# Patient Record
Sex: FEMALE | Race: WHITE | NOT HISPANIC OR LATINO | Employment: OTHER | URBAN - METROPOLITAN AREA
[De-identification: names, ages, dates, MRNs, and addresses within clinical notes are randomized per-mention and may not be internally consistent; named-entity substitution may affect disease eponyms.]

---

## 2017-02-22 RX ORDER — ATORVASTATIN CALCIUM 20 MG/1
TABLET, FILM COATED ORAL
Qty: 30 TAB | Refills: 3 | Status: SHIPPED | OUTPATIENT
Start: 2017-02-22 | End: 2023-02-16

## 2018-07-25 ENCOUNTER — OFFICE VISIT (OUTPATIENT)
Dept: NEUROLOGY | Facility: MEDICAL CENTER | Age: 48
End: 2018-07-25
Payer: MEDICAID

## 2018-07-25 VITALS
WEIGHT: 137.57 LBS | RESPIRATION RATE: 18 BRPM | TEMPERATURE: 97.3 F | SYSTOLIC BLOOD PRESSURE: 110 MMHG | OXYGEN SATURATION: 95 % | HEIGHT: 59 IN | DIASTOLIC BLOOD PRESSURE: 66 MMHG | BODY MASS INDEX: 27.73 KG/M2 | HEART RATE: 63 BPM

## 2018-07-25 DIAGNOSIS — G89.4 CHRONIC PAIN SYNDROME: ICD-10-CM

## 2018-07-25 DIAGNOSIS — G43.119 MIGRAINE WITH AURA, INTRACTABLE, WITHOUT STATUS MIGRAINOSUS: Primary | ICD-10-CM

## 2018-07-25 PROCEDURE — 99205 OFFICE O/P NEW HI 60 MIN: CPT | Performed by: PSYCHIATRY & NEUROLOGY

## 2018-07-25 RX ORDER — TOPIRAMATE 25 MG/1
TABLET ORAL
Qty: 120 TAB | Refills: 1 | Status: SHIPPED | OUTPATIENT
Start: 2018-07-25 | End: 2018-10-21 | Stop reason: SDUPTHER

## 2018-07-25 RX ORDER — SUMATRIPTAN 100 MG/1
TABLET, FILM COATED ORAL
Qty: 9 TAB | Refills: 6 | Status: SHIPPED | OUTPATIENT
Start: 2018-07-25 | End: 2019-04-26 | Stop reason: SDUPTHER

## 2018-07-25 RX ORDER — GABAPENTIN 800 MG/1
800 TABLET ORAL 3 TIMES DAILY
Qty: 90 TAB | Refills: 0 | Status: SHIPPED | DISCHARGE
Start: 2018-07-25

## 2018-07-25 ASSESSMENT — ENCOUNTER SYMPTOMS
DIZZINESS: 0
PHOTOPHOBIA: 1
DOUBLE VISION: 0
MEMORY LOSS: 1
HEADACHES: 1

## 2018-07-25 ASSESSMENT — PATIENT HEALTH QUESTIONNAIRE - PHQ9: CLINICAL INTERPRETATION OF PHQ2 SCORE: 0

## 2018-07-25 ASSESSMENT — PAIN SCALES - GENERAL: PAINLEVEL: 5=MODERATE PAIN

## 2018-07-25 NOTE — PROGRESS NOTES
Subjective:      Meggan Espinoza is a 48 y.o. female who presents from the office of Dr. Painting, for consultation, with a history of persistent headaches associated with word finding difficulties and memory loss.     RADHA Broderick is a pleasant 48-year-old right-handed  female whose symptoms all seem to have started together beginning a couple of years ago, and though things have not worsened, they have not improved.    The word finding issues she describes her ones of hesitancy when expressing herself. She has no issues with comprehension, she will in mid sentence for get a word, has difficulty finding the right words though can explain herself around it. Others identify the word, she recognizes it and uses it appropriately. Comprehension is not an issue. Paraphasic errors are not used. She can use these words in other conversation without issue. This is an intermittent issue but occurs on a daily basis.    With this, memory loss also has become notable. Though she is independent with her ADLs, she notes that her mental processing is simply slower. She is more likely to forget things though it has not gotten her into trouble when he comes to appointments, bills, daily activities around the home, etc. She is sleeping well.    The headaches are most problematic, she describes a daily moderate pain level of 5 a 0-10 scale. It is one-sided, typically on the left, there is some definite malaise and fatigue. Severe headaches remain left-sided but these now pulse or throb, concentration and word finding issues clearly deteriorate further, she can become nauseated, there are heightened sensitivities to sounds and light, she would simply prefer to close herself off from the rest of the world.    The severe attacks occur maybe once in a week, sometimes more often, these started about one year ago, moderate headaches began 2 years beforehand. The severe headache attacks tend to start in the mornings.    Stress  "and menses have been the typical triggers for the severe headaches. She has not received any prescription medications for the headaches.    MRI scan of the brain was done in October, 2017, I reviewed the images, the study was unremarkable.    She has a history of hypertension, diabetes without neuropathy, nephropathy or retinopathy, and dyslipidemia. There is no history of CAD, CVA, PVD, malignancy, MS, seizure, migraine, neurodegenerative disease, autoimmune disease, psychiatric disease, liver or kidney disease, kidney stones, glaucoma, pulmonary disease, GERD, or blood dyscrasia. There is no surgical history of note from my standpoint. She is not aware of anyone in the immediate family who has a history of headaches, her mother had dementia, she has 4 children, age range 18 through 28, none of whom have headaches. Her menses are irregular. She does not smoke or drink.    She is on Prinivil 20 mg daily, Lantus insulin and Glucophage, Neurontin 800 mg, 3 times a day, Lipitor 20 g daily, vitamin D and calcium supplements daily.    Review of Systems   Constitutional: Negative for malaise/fatigue.   Eyes: Positive for photophobia. Negative for double vision.   Cardiovascular: Negative for leg swelling.   Neurological: Positive for headaches. Negative for dizziness.   Psychiatric/Behavioral: Positive for memory loss.   All other systems reviewed and are negative.       Objective:     /66   Pulse 63   Temp 36.3 °C (97.3 °F)   Resp 18   Ht 1.499 m (4' 11\")   Wt 62.4 kg (137 lb 9.1 oz)   LMP 07/02/2015   SpO2 95%   BMI 27.79 kg/m²      Physical Exam    She appears in no acute distress. She is quite cooperative. She still complains of moderate headache pain, there is mild photophobia. Vital signs are stable. There was no malar rash, there is bitemporal tenderness without jaw claudication. The neck is supple, range of motion is full, carotid pulses are present bilaterally without asymmetry or bruits. Cardiac " evaluation reveals a regular rhythm. There is no lower extremity edema.    Cognition is intact, she is fully oriented, there is no aphasia, apraxia, or inattention.    Cranial nerve exam reveals PERRLA/EOMI, visual fields are full to finger counting confrontation, funduscopic exam reveals sharp disc margins, facial movements are symmetric, sensory exam is intact to temperature and light touch bilaterally, the tongue and uvula are midline without bulbar dysfunction, shoulder shrug and head rotation are intact.    Musculoskeletal exam reveals normal tone throughout, there is no tremor, asterixis, or drift. The ankle jerks are absent, knee jerks trace present, reflexes in the upper extremities are symmetric. Both toes are downgoing.    She walks with normal station though she looks at the ground continuously. Stride length is diminished, she does not shuffle, she walks with a flatfoot, almost with a magnetic quality. Tandem walking is problematic. She can walk on her heels and toes. There is no appendicular dystaxia, repetitive movements are intact and symmetric in all 4 extremities.    Sensory exam reveals a stocking loss of vibration below the ankles, temperature is intact bilaterally. Romberg is absent.     Assessment/Plan:     1. Migraine with aura, intractable, without status migrainosus  I suspect all of the symptoms she is experiencing including the word finding difficulties and concentration impairment/memory loss, had more to do with chronic pain and her migraines which have achieved a very intractable state over the last couple of years, rather than a primary CNS process with symptomatic headaches. Migraine is often associated with focal neurologic signs and symptoms, it is certainly associated with cognitive impairment, etc. when it is at its worst. Imaging has already ruled out structural pathology, an EEG should be done for thoroughness to rule out nonconvulsive seizures as part of this picture.    With a  history of headaches that fit migraine criteria, such as hers, along with a normal neurologic examination, there is a less than 0.2% chance of this being symptomatic issue. Symptomatic relief should be initiated, we will observe to see how things go.    The nature of migraine was reviewed. The rationale for my treatment recommendations and EEG study being performed were also reviewed. She has a good prognosis for treatment outcome in that she is never been on medication to begin with, though she is on gabapentin at a good dose for other reasons, and this obviously has not provided benefit. She almost fits criteria for chronic migraine, in that circumstance Topamax and Botox are the recommended treatments. Topamax 25 mg daily at bedtime will be started, increasing weekly by 25 mg increments up to 100 mg daily at bedtime. Side effects were reviewed. For rescue, Imitrex 100 mg tablets will be used as soon as the daily headaches begin to increase, and this can be repeated in one hour. Side effects of the treatments were reviewed in full. Phone contact in the interim, we can follow-up in several months otherwise.    - sumatriptan (IMITREX) 100 MG tablet; 1 tab at headache onset; repeat in 1 hour prn  Dispense: 9 Tab; Refill: 6  - topiramate (TOPAMAX) 25 MG Tab; 1 tab qhs for 1 week, then 2 tab qhs for 1 week, then 3 tab qhs for 1 week, then 4 tab qhs  Dispense: 120 Tab; Refill: 1  - REFERRAL TO NEURODIAGNOSTICS (EEG,EP,EMG/NCS/DBS) Modality Requested: EEG-Video

## 2018-10-21 DIAGNOSIS — G43.119 MIGRAINE WITH AURA, INTRACTABLE, WITHOUT STATUS MIGRAINOSUS: ICD-10-CM

## 2018-10-22 RX ORDER — TOPIRAMATE 100 MG/1
100 TABLET, FILM COATED ORAL
Qty: 90 TAB | Refills: 3 | Status: SHIPPED | OUTPATIENT
Start: 2018-10-22 | End: 2018-11-26 | Stop reason: CLARIF

## 2018-11-19 ENCOUNTER — NON-PROVIDER VISIT (OUTPATIENT)
Dept: NEUROLOGY | Facility: MEDICAL CENTER | Age: 48
End: 2018-11-19
Payer: MEDICAID

## 2018-11-19 DIAGNOSIS — G43.119 MIGRAINE WITH AURA, INTRACTABLE, WITHOUT STATUS MIGRAINOSUS: ICD-10-CM

## 2018-11-19 PROCEDURE — 95951 EEG: CPT | Mod: 52 | Performed by: PSYCHIATRY & NEUROLOGY

## 2018-11-19 NOTE — PROCEDURES
VIDEO ELECTROENCEPHALOGRAM REPORT      Referring provider: Dr. Painting    DOS: November 19, 2018, for a total of approximately 30-minute duration.    INDICATION:  Meggan Espinoza 48 y.o. female presenting with a history of intractable migraine headache with associated cognitive impairment and a aphasia.    CURRENT ANTIEPILEPTIC REGIMEN: She is on Topamax 100 mg, nightly, for migraine prevention.    TECHNIQUE: 30 channel video electroencephalogram (EEG) was performed in accordance with the international 10-20 system. The study was reviewed in bipolar and referential montages. The recording examined the patient during wakeful and drowsy state(s).     DESCRIPTION OF THE RECORD:  During the wakefulness, the background showed a symmetrical 10 Hz alpha activity posteriorly with amplitude of 70 mV.  There was reactivity to eye closure/opening.  A normal anterior-posterior gradient was noted with faster beta frequencies seen anteriorly.  During drowsiness, generalized suppression of background rhythm is seen, no focal slowing or paroxysmal qualities are seen.  The patient does not achieve sleep for the tracing.    ACTIVATION PROCEDURES:   Hyperventilation was performed by the patient for a total of 3 minutes. The technician performing the test noted good effort. This technique produced electroencephalographic changes in keeping with the expected bilaterally synchronous, frontally predominant, high amplitude slow waves build up.     Intermittent Photic stimulation was performed in a stepwise fashion from 1 to 30 Hz, eliciting some driving response posteriorly over the occipital hemispheres at intermediate frequencies of stimulation only, activation did not occur otherwise.    ICTAL AND/OR INTERICTAL FINDINGS:   No focal or generalized epileptiform activity noted. No regional slowing was seen during this routine study.  No clinical events or seizures were reported or recorded during the study.     EKG: sampling of the  EKG recording demonstrated sinus rhythm.     EVENTS: No clinical seizure activity or events are documented.    INTERPRETATION:  This is a normal video EEG recording in the awake and drowsy state(s).  Clinical correlation is recommended.  A cause for the patient's clinical symptoms of cognitive impairment and language deficit were found.    Note: A normal EEG does not rule out epilepsy.  If the clinical suspicion remains high for seizures, a prolonged recording to capture clinical or subclinical events may be helpful.        Jun Smith M.D.

## 2018-11-26 ENCOUNTER — OFFICE VISIT (OUTPATIENT)
Dept: NEUROLOGY | Facility: MEDICAL CENTER | Age: 48
End: 2018-11-26
Payer: MEDICAID

## 2018-11-26 VITALS
DIASTOLIC BLOOD PRESSURE: 64 MMHG | HEIGHT: 59 IN | OXYGEN SATURATION: 93 % | SYSTOLIC BLOOD PRESSURE: 100 MMHG | BODY MASS INDEX: 32.01 KG/M2 | TEMPERATURE: 98.3 F | RESPIRATION RATE: 18 BRPM | WEIGHT: 158.8 LBS | HEART RATE: 113 BPM

## 2018-11-26 DIAGNOSIS — G43.119 MIGRAINE WITH AURA, INTRACTABLE, WITHOUT STATUS MIGRAINOSUS: Primary | ICD-10-CM

## 2018-11-26 PROCEDURE — 99213 OFFICE O/P EST LOW 20 MIN: CPT | Performed by: PSYCHIATRY & NEUROLOGY

## 2018-11-26 RX ORDER — TOPIRAMATE 200 MG/1
1 CAPSULE, EXTENDED RELEASE ORAL EVERY EVENING
Qty: 30 CAP | Refills: 6 | Status: SHIPPED | OUTPATIENT
Start: 2018-11-26 | End: 2019-04-26 | Stop reason: SDUPTHER

## 2018-11-26 RX ORDER — TRAZODONE HYDROCHLORIDE 50 MG/1
50-100 TABLET ORAL NIGHTLY
COMMUNITY

## 2018-11-26 ASSESSMENT — PAIN SCALES - GENERAL: PAINLEVEL: 7=MODERATE-SEVERE PAIN

## 2018-11-26 ASSESSMENT — ENCOUNTER SYMPTOMS
MEMORY LOSS: 0
HEADACHES: 1
PHOTOPHOBIA: 0

## 2018-11-26 NOTE — PROGRESS NOTES
"Subjective:      Meggan Espinoza is a 48 y.o. female who presents for follow-up with a history of chronic migraine with aura.     HPI    Meggan states that the headaches have improved consistently, suboptimally, though she is still dealing with daily headache pain.  Though Imitrex 100 mg does work consistently, on Topamax 100 mg nightly without issue of side effect or adverse event, she is still chewing through more than #9 Imitrex tablets/month, if she could in any case.  She denies issues with daytime grogginess, cognitive impairment, taste distortion or paresthesias with her Topamax.    Medical, surgical and family histories are reviewed in the electronic health record, there are no new drug allergies.  She is on Topamax 100 mg, nightly, Imitrex 100 mg as needed, Neurontin 800 mg, 3 times daily, Prinivil, trazodone, Glucophage, Lipitor, the rest as per the electronic health record, noncontributory from my standpoint.    Review of Systems   Constitutional: Positive for malaise/fatigue.   Eyes: Negative for photophobia.   Neurological: Positive for headaches.   Psychiatric/Behavioral: Negative for memory loss.   All other systems reviewed and are negative.       Objective:     /64 (BP Location: Right arm, Patient Position: Sitting)   Pulse (!) 113   Temp 36.8 °C (98.3 °F) (Temporal)   Resp 18   Ht 1.499 m (4' 11\")   Wt 72 kg (158 lb 12.8 oz)   LMP 07/02/2015   SpO2 93%   BMI 32.07 kg/m²      Physical Exam    She appears in no acute distress.  Vital signs are stable.  There is no malar rash or sialorrhea.  The neck is supple, range of motion is full.  Cardiac evaluation reveals a regular rhythm.  In quick and cursory fashion, with observation, mental status, cranial nerve, motor and coordination evaluations remain intact.  Sensory exam is deferred.     Assessment/Plan:     1. Migraine with aura, intractable, without status migrainosus  Still problematic, I am encouraged in that she has had some " benefit with Topamax, though it is suboptimal.  This suggests a continued responsiveness, thus I will increase the dose, using Trokendi XR formulation, better tolerated, increasing to 200 mg nightly.  If not covered by her insurance, we will get back to the generic topiramate 200 mg nightly.  Side effects were reviewed again.  She is doing well, avoiding OTC medicines entirely, she will use Imitrex as needed.    Face-to-face time was spent reviewing all of the above.  Botox or one of the new CGRP blockers would be the next options, she was reassured that can be used with Topamax if the drug provides benefit.  If there is no meaningful change with a higher dose of Topamax, and will simply be discontinued and another treatment instituted.  She and I will follow-up in 5 months.    - Topiramate ER (TROKENDI XR) 200 MG CAPSULE SR 24 HR; Take 1 Capsule 24 hour sustained release by mouth every evening.  Dispense: 30 Cap; Refill: 6    Time: 20 minutes spent face-to-face for exam, review, discussion, and education, of this over 70% of the time spent counseling and coordinating care.

## 2019-04-26 ENCOUNTER — OFFICE VISIT (OUTPATIENT)
Dept: NEUROLOGY | Facility: MEDICAL CENTER | Age: 49
End: 2019-04-26
Payer: MEDICAID

## 2019-04-26 VITALS
SYSTOLIC BLOOD PRESSURE: 100 MMHG | HEIGHT: 59 IN | BODY MASS INDEX: 29.92 KG/M2 | WEIGHT: 148.4 LBS | TEMPERATURE: 95.9 F | RESPIRATION RATE: 15 BRPM | OXYGEN SATURATION: 93 % | DIASTOLIC BLOOD PRESSURE: 66 MMHG | HEART RATE: 85 BPM

## 2019-04-26 DIAGNOSIS — G43.119 MIGRAINE WITH AURA, INTRACTABLE, WITHOUT STATUS MIGRAINOSUS: Primary | ICD-10-CM

## 2019-04-26 PROCEDURE — 99213 OFFICE O/P EST LOW 20 MIN: CPT | Performed by: PSYCHIATRY & NEUROLOGY

## 2019-04-26 RX ORDER — TOPIRAMATE 200 MG/1
1 CAPSULE, EXTENDED RELEASE ORAL EVERY EVENING
Qty: 30 CAP | Refills: 6 | Status: SHIPPED | OUTPATIENT
Start: 2019-04-26 | End: 2020-01-03

## 2019-04-26 RX ORDER — SUMATRIPTAN 100 MG/1
TABLET, FILM COATED ORAL
Qty: 9 TAB | Refills: 11 | Status: SHIPPED | OUTPATIENT
Start: 2019-04-26 | End: 2020-05-18

## 2019-04-26 RX ORDER — KETOROLAC TROMETHAMINE 30 MG/ML
60 INJECTION, SOLUTION INTRAMUSCULAR; INTRAVENOUS ONCE
OUTPATIENT
Start: 2019-04-26 | End: 2019-04-27

## 2019-04-26 ASSESSMENT — PATIENT HEALTH QUESTIONNAIRE - PHQ9: CLINICAL INTERPRETATION OF PHQ2 SCORE: 0

## 2019-04-26 ASSESSMENT — ENCOUNTER SYMPTOMS
PHOTOPHOBIA: 1
HEADACHES: 1

## 2019-04-26 ASSESSMENT — PAIN SCALES - GENERAL: PAINLEVEL: 6=MODERATE PAIN

## 2019-04-26 NOTE — PROGRESS NOTES
"Subjective:      Meggan Espinoza is a 49 y.o. female who presents for 5-month follow-up, with a history of intractable migraine with aura.    HPI    When seen last year, Meggan had stated the headaches were responding nicely though still incomplete.  Imitrex was working, she was still using it more than #9 tablets in a month.    Over the last couple of months, the headaches have again increased, for more than 3 months she has been dealing with more than 20 days/month of incapacitating pain.  The headaches themselves are still unchanged in nature, they are simply lasting longer when they occur.  Imitrex works, the problem is the headaches recur.  We had increased her Trokendi XL to 200 mg daily, even in the evenings the headaches have increased.  She is tolerating the higher dose without issues of drowsiness, cognitive impairment, etc.    Medical, surgical and family histories are reviewed in the electronic health record, there are no new drug allergies.  She is on gabapentin 800 mg, 3 times daily, for chronic pain, Prinivil, Glucophage, Lipitor, trazodone, as well as the Imitrex and Trokendi XL as above.    Review of Systems   Eyes: Positive for photophobia.   Neurological: Positive for headaches.   All other systems reviewed and are negative.       Objective:     /66 (BP Location: Right arm, Patient Position: Sitting)   Pulse 85   Temp (!) 35.5 °C (95.9 °F) (Temporal)   Resp 15   Ht 1.499 m (4' 11\")   Wt 67.3 kg (148 lb 6.4 oz)   LMP 07/02/2015   SpO2 93%   BMI 29.97 kg/m²      Physical Exam    She appears in some mild distress, she is complaining of a significant headache with photophobia.  She is still cooperative.  Vital signs are stable.  There is no malar rash.  The neck is supple, range of motion full.  Cardiac evaluation reveals a regular rhythm.  In quick and cursory fashion, mental status, cranial nerve, musculoskeletal and coordination evaluations are intact.     Assessment/Plan:     1. " Migraine with aura, intractable, without status migrainosus  She fits criteria for chronic migraine, higher doses of Topamax may have provided a little benefit, tachyphylaxis certainly can develop with this drug.  We will continue its use nonetheless since it has been providing some benefit, but Emgality will be started, 1 of the newer CGRP migraine maintenance drugs available, he can be safely along with her other medications.  Side effects were reviewed.  It is a 120 mg monthly dose, the first dose is a 240 mg bolus.  A prescription card is provided.  Phone contact in the interim, we will follow-up in about 5 months.    For the headache she is suffering from now, Toradol 60 mg IM will be provided in the office.    - Galcanezumab-gnlm (EMGALITY) 120 MG/ML Solution Auto-injector; Inject 1 PEN as instructed Q 4 Weeks.  Dispense: 1 PEN; Refill: 5  - Topiramate ER (TROKENDI XR) 200 MG CAPSULE SR 24 HR; Take 1 Capsule 24 hour sustained release by mouth every evening.  Dispense: 30 Cap; Refill: 6  - sumatriptan (IMITREX) 100 MG tablet; 1 tab at headache onset; repeat in 1 hour prn  Dispense: 9 Tab; Refill: 11  - ketorolac (TORADOL) injection 60 mg; 2 mL by Intramuscular route Once.    Time: 20 minutes spent face-to-face for exam, review, discussion, and education, of this over 50% of the time spent face-to-face counseling and coordinating care.  I0

## 2019-08-05 ENCOUNTER — TELEPHONE (OUTPATIENT)
Dept: NEUROLOGY | Facility: MEDICAL CENTER | Age: 49
End: 2019-08-05

## 2019-08-05 NOTE — TELEPHONE ENCOUNTER
"Patient's insurance denied her reauthorization for her Emgality because your note did not specifically specify that there was a reduction in her migraines. To appeal the denial the letter says \"we may consider continued approval of this drug in certain situations (when there is a reduction in the overall number of migraine days or reduction in number of severe migraine days per month and there is obtained clinical benefit deemed significant by the patient or prescriber).\"    Can you please write a letter stating such so I can mail to her insurance? It must be mailed to:     Central Appeals Processing  [facility] [employer] [facility] [employer]  [address] [id number]  [place], [place] [address]    "

## 2019-08-06 NOTE — TELEPHONE ENCOUNTER
Since I have not seen the patient since the Emgality was started, it is impossible for me to have a note in the chart stating that the drug is working.  If it is, I need to hear from the patient as to what type of improvement she is getting.  Before the drug was started, she was getting more than 20 days/month of incapacitating headaches.  I need to know how many days in a week she is having a severe headache for which she is using her Imitrex.

## 2019-08-09 NOTE — TELEPHONE ENCOUNTER
I was actually able to get Emgality approved by insurance. No need for letter. Approval is scanned into chart.

## 2019-08-22 ENCOUNTER — OFFICE VISIT (OUTPATIENT)
Dept: NEUROLOGY | Facility: MEDICAL CENTER | Age: 49
End: 2019-08-22
Payer: MEDICAID

## 2019-08-22 VITALS
WEIGHT: 145 LBS | OXYGEN SATURATION: 97 % | DIASTOLIC BLOOD PRESSURE: 78 MMHG | SYSTOLIC BLOOD PRESSURE: 102 MMHG | BODY MASS INDEX: 29.23 KG/M2 | HEIGHT: 59 IN | TEMPERATURE: 97.4 F | HEART RATE: 66 BPM

## 2019-08-22 DIAGNOSIS — G43.119 MIGRAINE WITH AURA, INTRACTABLE, WITHOUT STATUS MIGRAINOSUS: Primary | ICD-10-CM

## 2019-08-22 PROCEDURE — 99213 OFFICE O/P EST LOW 20 MIN: CPT | Performed by: PSYCHIATRY & NEUROLOGY

## 2019-08-22 ASSESSMENT — ENCOUNTER SYMPTOMS
MEMORY LOSS: 0
HEADACHES: 1

## 2019-08-22 NOTE — PROGRESS NOTES
"Subjective:      Meggan Espinoza is a 49 y.o. female who presents for follow-up, with a history of migraine with aura, intractable, now on Emgality over the last several months with consistent though still imperfect response.    HPI     The headaches have shown a consistent benefit with Emgality, takes maybe 1 week for maximal benefit to be seen, he does very well for the next 2, but the headaches then begin to recur in the week prior to her next injection.  The headaches at that time are also a little bit more intense.  She is remained on Trokendi  mg every evening throughout, Imitrex used as rescue.  During the 3 weeks of improving and stable response, she will use Imitrex rarely, but during the last week she may have to use it 4-5 times overall.    The Emgality itself is well-tolerated, she denies issues with constipation or hair loss.    Medical, surgical and family histories are reviewed in the electronic health record, there are no new drug allergies.  In addition to the above, she is also on Neurontin 800 mg Prinivil, trazodone, Glucophage, and Lipitor.    Review of Systems   Neurological: Positive for headaches.   Psychiatric/Behavioral: Negative for memory loss.   All other systems reviewed and are negative.       Objective:     /78 (BP Location: Left arm, Patient Position: Sitting, BP Cuff Size: Adult)   Pulse 66   Temp 36.3 °C (97.4 °F) (Temporal)   Ht 1.499 m (4' 11\")   Wt 65.8 kg (145 lb)   LMP 07/02/2015   SpO2 97%   BMI 29.29 kg/m²      Physical Exam    She appears in no acute distress.  Vital signs are stable.  There is no malar rash or jaw claudication.  The neck is supple.  Cardiac evaluation is unremarkable.  There is the restrained range of motion of the right shoulder.    Including mental status, cranial nerves, musculoskeletal and coordination evaluations, the full examination is unremarkable.     Assessment/Plan:     1. Migraine with aura, intractable, without status " migrainosus  I would like to maintain the course for just a few more months to see what type of benefit is seen at maximum.  This may take a little while to achieve, the studies were 6-month duration, she has been on the drug for only 3+ months.  She is comfortable with this.  She will continue the Trokendi XR and Imitrex unchanged.  We will follow-up otherwise in about 6 months.    Time: 20 minutes spent face-to-face for exam, review, discussion, and education, of this over 50% of the time spent counseling and coordinating care.

## 2019-09-30 DIAGNOSIS — G43.119 MIGRAINE WITH AURA, INTRACTABLE, WITHOUT STATUS MIGRAINOSUS: ICD-10-CM

## 2019-09-30 RX ORDER — TOPIRAMATE 100 MG/1
TABLET, FILM COATED ORAL
Qty: 30 TAB | Refills: 11 | Status: SHIPPED | OUTPATIENT
Start: 2019-09-30 | End: 2020-12-14

## 2019-10-01 DIAGNOSIS — G43.119 MIGRAINE WITH AURA, INTRACTABLE, WITHOUT STATUS MIGRAINOSUS: ICD-10-CM

## 2019-10-01 NOTE — TELEPHONE ENCOUNTER
Was the patient seen in the last year in this department? Yes    Does patient have an active prescription for medications requested? Yes    Received Request Via: Pharmacy    This is the maintenance dose

## 2019-10-02 RX ORDER — GALCANEZUMAB 120 MG/ML
INJECTION, SOLUTION SUBCUTANEOUS
Qty: 1 PEN | Refills: 5 | Status: SHIPPED | OUTPATIENT
Start: 2019-10-02 | End: 2020-04-16 | Stop reason: SDUPTHER

## 2020-01-02 DIAGNOSIS — G43.119 MIGRAINE WITH AURA, INTRACTABLE, WITHOUT STATUS MIGRAINOSUS: ICD-10-CM

## 2020-01-03 RX ORDER — TOPIRAMATE 200 MG/1
CAPSULE, EXTENDED RELEASE ORAL
Qty: 30 CAP | Refills: 6 | Status: SHIPPED | OUTPATIENT
Start: 2020-01-03 | End: 2020-07-21

## 2020-03-04 ENCOUNTER — TELEPHONE (OUTPATIENT)
Dept: NEUROLOGY | Facility: MEDICAL CENTER | Age: 50
End: 2020-03-04

## 2020-04-16 DIAGNOSIS — G43.119 MIGRAINE WITH AURA, INTRACTABLE, WITHOUT STATUS MIGRAINOSUS: ICD-10-CM

## 2020-04-16 RX ORDER — GALCANEZUMAB 120 MG/ML
120 INJECTION, SOLUTION SUBCUTANEOUS
Qty: 1 PEN | Refills: 5 | Status: SHIPPED | OUTPATIENT
Start: 2020-04-16 | End: 2020-10-14

## 2020-04-16 NOTE — TELEPHONE ENCOUNTER
Received request via: Patient    Was the patient seen in the last year in this department? Yes    Does the patient have an active prescription (recently filled or refills available) for medication(s) requested? Yes.

## 2020-05-18 DIAGNOSIS — G43.119 MIGRAINE WITH AURA, INTRACTABLE, WITHOUT STATUS MIGRAINOSUS: ICD-10-CM

## 2020-05-18 RX ORDER — SUMATRIPTAN 100 MG/1
TABLET, FILM COATED ORAL
Qty: 9 TAB | Refills: 11 | Status: SHIPPED | OUTPATIENT
Start: 2020-05-18 | End: 2021-06-01

## 2020-07-19 DIAGNOSIS — G43.119 MIGRAINE WITH AURA, INTRACTABLE, WITHOUT STATUS MIGRAINOSUS: ICD-10-CM

## 2020-07-21 RX ORDER — TOPIRAMATE 200 MG/1
CAPSULE, EXTENDED RELEASE ORAL
Qty: 30 CAP | Refills: 6 | Status: SHIPPED | OUTPATIENT
Start: 2020-07-21 | End: 2021-02-01

## 2020-09-15 ENCOUNTER — TELEPHONE (OUTPATIENT)
Dept: NEUROLOGY | Facility: MEDICAL CENTER | Age: 50
End: 2020-09-15

## 2020-09-15 NOTE — TELEPHONE ENCOUNTER
PA for patient Emgality 120mg/ML sent in through [employer] on 9/15/2020.  Papers will be scanned into chart.  Waiting for response.     [email or web address] stated this:    PA Case: [id number], Status: Approved, Coverage Starts on: 9/15/2020 12:00:00 AM, Coverage Ends on: 9/15/2021 12:00:00 AM.

## 2020-09-17 ENCOUNTER — PRE-ADMISSION TESTING (OUTPATIENT)
Dept: ADMISSIONS | Facility: MEDICAL CENTER | Age: 50
End: 2020-09-17
Attending: ORTHOPAEDIC SURGERY
Payer: MEDICAID

## 2020-09-17 DIAGNOSIS — Z01.812 PRE-OPERATIVE LABORATORY EXAMINATION: ICD-10-CM

## 2020-09-17 DIAGNOSIS — Z01.810 PRE-OPERATIVE CARDIOVASCULAR EXAMINATION: ICD-10-CM

## 2020-09-17 LAB
ANION GAP SERPL CALC-SCNC: 16 MMOL/L (ref 7–16)
BUN SERPL-MCNC: 21 MG/DL (ref 8–22)
CALCIUM SERPL-MCNC: 8.7 MG/DL (ref 8.4–10.2)
CHLORIDE SERPL-SCNC: 109 MMOL/L (ref 96–112)
CO2 SERPL-SCNC: 18 MMOL/L (ref 20–33)
COVID ORDER STATUS COVID19: NORMAL
CREAT SERPL-MCNC: 1.08 MG/DL (ref 0.5–1.4)
EKG IMPRESSION: NORMAL
EST. AVERAGE GLUCOSE BLD GHB EST-MCNC: 163 MG/DL
GLUCOSE SERPL-MCNC: 157 MG/DL (ref 65–99)
HBA1C MFR BLD: 7.3 % (ref 0–5.6)
POTASSIUM SERPL-SCNC: 4.2 MMOL/L (ref 3.6–5.5)
SARS-COV-2 RNA RESP QL NAA+PROBE: NOTDETECTED
SODIUM SERPL-SCNC: 143 MMOL/L (ref 135–145)
SPECIMEN SOURCE: NORMAL

## 2020-09-17 PROCEDURE — 93005 ELECTROCARDIOGRAM TRACING: CPT

## 2020-09-17 PROCEDURE — 80048 BASIC METABOLIC PNL TOTAL CA: CPT

## 2020-09-17 PROCEDURE — 93010 ELECTROCARDIOGRAM REPORT: CPT | Performed by: INTERNAL MEDICINE

## 2020-09-17 PROCEDURE — 83036 HEMOGLOBIN GLYCOSYLATED A1C: CPT

## 2020-09-17 PROCEDURE — C9803 HOPD COVID-19 SPEC COLLECT: HCPCS

## 2020-09-17 PROCEDURE — U0003 INFECTIOUS AGENT DETECTION BY NUCLEIC ACID (DNA OR RNA); SEVERE ACUTE RESPIRATORY SYNDROME CORONAVIRUS 2 (SARS-COV-2) (CORONAVIRUS DISEASE [COVID-19]), AMPLIFIED PROBE TECHNIQUE, MAKING USE OF HIGH THROUGHPUT TECHNOLOGIES AS DESCRIBED BY CMS-2020-01-R: HCPCS

## 2020-09-17 PROCEDURE — 36415 COLL VENOUS BLD VENIPUNCTURE: CPT

## 2020-09-17 RX ORDER — LANCETS
EACH MISCELLANEOUS
COMMUNITY
Start: 2020-08-19

## 2020-09-17 RX ORDER — INSULIN GLARGINE 100 [IU]/ML
24 INJECTION, SOLUTION SUBCUTANEOUS EVERY EVENING
COMMUNITY
Start: 2020-08-21 | End: 2023-02-16

## 2020-09-17 RX ORDER — FAMOTIDINE 20 MG/1
TABLET, FILM COATED ORAL
COMMUNITY
Start: 2020-09-14 | End: 2023-02-16

## 2020-09-17 NOTE — OR NURSING
" Reviewed \"Preparing for your procedure\" verbally and copy given to pt. Also given \"fasting\", Pain management\", \"Patient Safety\", \"Speak-up\" handouts. Pointed to surgical site listed on consent.   "

## 2020-09-22 ENCOUNTER — ANESTHESIA EVENT (OUTPATIENT)
Dept: SURGERY | Facility: MEDICAL CENTER | Age: 50
End: 2020-09-22
Payer: MEDICAID

## 2020-09-22 ENCOUNTER — HOSPITAL ENCOUNTER (OUTPATIENT)
Facility: MEDICAL CENTER | Age: 50
End: 2020-09-22
Attending: ORTHOPAEDIC SURGERY | Admitting: ORTHOPAEDIC SURGERY
Payer: MEDICAID

## 2020-09-22 ENCOUNTER — ANESTHESIA (OUTPATIENT)
Dept: SURGERY | Facility: MEDICAL CENTER | Age: 50
End: 2020-09-22
Payer: MEDICAID

## 2020-09-22 VITALS
OXYGEN SATURATION: 97 % | RESPIRATION RATE: 20 BRPM | HEART RATE: 90 BPM | WEIGHT: 149.69 LBS | DIASTOLIC BLOOD PRESSURE: 86 MMHG | SYSTOLIC BLOOD PRESSURE: 130 MMHG | TEMPERATURE: 97.3 F | BODY MASS INDEX: 30.18 KG/M2 | HEIGHT: 59 IN

## 2020-09-22 LAB — GLUCOSE BLD-MCNC: 110 MG/DL (ref 65–99)

## 2020-09-22 PROCEDURE — 160036 HCHG PACU - EA ADDL 30 MINS PHASE I: Performed by: ORTHOPAEDIC SURGERY

## 2020-09-22 PROCEDURE — 160035 HCHG PACU - 1ST 60 MINS PHASE I: Performed by: ORTHOPAEDIC SURGERY

## 2020-09-22 PROCEDURE — 64415 NJX AA&/STRD BRCH PLXS IMG: CPT | Performed by: ORTHOPAEDIC SURGERY

## 2020-09-22 PROCEDURE — 160041 HCHG SURGERY MINUTES - EA ADDL 1 MIN LEVEL 4: Performed by: ORTHOPAEDIC SURGERY

## 2020-09-22 PROCEDURE — 160029 HCHG SURGERY MINUTES - 1ST 30 MINS LEVEL 4: Performed by: ORTHOPAEDIC SURGERY

## 2020-09-22 PROCEDURE — 160048 HCHG OR STATISTICAL LEVEL 1-5: Performed by: ORTHOPAEDIC SURGERY

## 2020-09-22 PROCEDURE — 501838 HCHG SUTURE GENERAL: Performed by: ORTHOPAEDIC SURGERY

## 2020-09-22 PROCEDURE — 160047 HCHG PACU  - EA ADDL 30 MINS PHASE II: Performed by: ORTHOPAEDIC SURGERY

## 2020-09-22 PROCEDURE — 700101 HCHG RX REV CODE 250: Performed by: ANESTHESIOLOGY

## 2020-09-22 PROCEDURE — 700105 HCHG RX REV CODE 258: Performed by: ORTHOPAEDIC SURGERY

## 2020-09-22 PROCEDURE — 700111 HCHG RX REV CODE 636 W/ 250 OVERRIDE (IP): Performed by: ANESTHESIOLOGY

## 2020-09-22 PROCEDURE — 82962 GLUCOSE BLOOD TEST: CPT

## 2020-09-22 PROCEDURE — 160046 HCHG PACU - 1ST 60 MINS PHASE II: Performed by: ORTHOPAEDIC SURGERY

## 2020-09-22 PROCEDURE — 160009 HCHG ANES TIME/MIN: Performed by: ORTHOPAEDIC SURGERY

## 2020-09-22 PROCEDURE — 160002 HCHG RECOVERY MINUTES (STAT): Performed by: ORTHOPAEDIC SURGERY

## 2020-09-22 PROCEDURE — C1713 ANCHOR/SCREW BN/BN,TIS/BN: HCPCS | Performed by: ORTHOPAEDIC SURGERY

## 2020-09-22 PROCEDURE — 502581 HCHG PACK, SHOULDER ARTHROSCOPY: Performed by: ORTHOPAEDIC SURGERY

## 2020-09-22 PROCEDURE — 500151 HCHG CANNULA, THRDED 8.4: Performed by: ORTHOPAEDIC SURGERY

## 2020-09-22 PROCEDURE — 700101 HCHG RX REV CODE 250: Performed by: ORTHOPAEDIC SURGERY

## 2020-09-22 PROCEDURE — 160025 RECOVERY II MINUTES (STATS): Performed by: ORTHOPAEDIC SURGERY

## 2020-09-22 PROCEDURE — 500028 HCHG ARTHROWAND TURBOVAC 3.5/90 SUCT.: Performed by: ORTHOPAEDIC SURGERY

## 2020-09-22 DEVICE — SUTURE ANCHOR 2.8MM: Type: IMPLANTABLE DEVICE | Site: SHOULDER | Status: FUNCTIONAL

## 2020-09-22 RX ORDER — ONDANSETRON 2 MG/ML
4 INJECTION INTRAMUSCULAR; INTRAVENOUS
Status: DISCONTINUED | OUTPATIENT
Start: 2020-09-22 | End: 2020-09-22 | Stop reason: HOSPADM

## 2020-09-22 RX ORDER — HYDRALAZINE HYDROCHLORIDE 20 MG/ML
5 INJECTION INTRAMUSCULAR; INTRAVENOUS
Status: DISCONTINUED | OUTPATIENT
Start: 2020-09-22 | End: 2020-09-22 | Stop reason: HOSPADM

## 2020-09-22 RX ORDER — HYDROMORPHONE HYDROCHLORIDE 1 MG/ML
0.1 INJECTION, SOLUTION INTRAMUSCULAR; INTRAVENOUS; SUBCUTANEOUS
Status: DISCONTINUED | OUTPATIENT
Start: 2020-09-22 | End: 2020-09-22 | Stop reason: HOSPADM

## 2020-09-22 RX ORDER — SODIUM CHLORIDE, SODIUM LACTATE, POTASSIUM CHLORIDE, CALCIUM CHLORIDE 600; 310; 30; 20 MG/100ML; MG/100ML; MG/100ML; MG/100ML
INJECTION, SOLUTION INTRAVENOUS CONTINUOUS
Status: DISCONTINUED | OUTPATIENT
Start: 2020-09-22 | End: 2020-09-22 | Stop reason: HOSPADM

## 2020-09-22 RX ORDER — CEFAZOLIN SODIUM 1 G/3ML
INJECTION, POWDER, FOR SOLUTION INTRAMUSCULAR; INTRAVENOUS PRN
Status: DISCONTINUED | OUTPATIENT
Start: 2020-09-22 | End: 2020-09-22 | Stop reason: SURG

## 2020-09-22 RX ORDER — ROPIVACAINE HYDROCHLORIDE 5 MG/ML
INJECTION, SOLUTION EPIDURAL; INFILTRATION; PERINEURAL
Status: COMPLETED | OUTPATIENT
Start: 2020-09-22 | End: 2020-09-22

## 2020-09-22 RX ORDER — KETOROLAC TROMETHAMINE 30 MG/ML
30 INJECTION, SOLUTION INTRAMUSCULAR; INTRAVENOUS
Status: DISCONTINUED | OUTPATIENT
Start: 2020-09-22 | End: 2020-09-22 | Stop reason: HOSPADM

## 2020-09-22 RX ORDER — DEXAMETHASONE SODIUM PHOSPHATE 4 MG/ML
INJECTION, SOLUTION INTRA-ARTICULAR; INTRALESIONAL; INTRAMUSCULAR; INTRAVENOUS; SOFT TISSUE PRN
Status: DISCONTINUED | OUTPATIENT
Start: 2020-09-22 | End: 2020-09-22 | Stop reason: SURG

## 2020-09-22 RX ORDER — ROCURONIUM BROMIDE 10 MG/ML
INJECTION, SOLUTION INTRAVENOUS PRN
Status: DISCONTINUED | OUTPATIENT
Start: 2020-09-22 | End: 2020-09-22 | Stop reason: SURG

## 2020-09-22 RX ORDER — HYDROMORPHONE HYDROCHLORIDE 1 MG/ML
0.2 INJECTION, SOLUTION INTRAMUSCULAR; INTRAVENOUS; SUBCUTANEOUS
Status: DISCONTINUED | OUTPATIENT
Start: 2020-09-22 | End: 2020-09-22 | Stop reason: HOSPADM

## 2020-09-22 RX ORDER — LIDOCAINE HYDROCHLORIDE 20 MG/ML
INJECTION, SOLUTION EPIDURAL; INFILTRATION; INTRACAUDAL; PERINEURAL PRN
Status: DISCONTINUED | OUTPATIENT
Start: 2020-09-22 | End: 2020-09-22 | Stop reason: SURG

## 2020-09-22 RX ORDER — DIPHENHYDRAMINE HYDROCHLORIDE 50 MG/ML
6.25 INJECTION INTRAMUSCULAR; INTRAVENOUS
Status: DISCONTINUED | OUTPATIENT
Start: 2020-09-22 | End: 2020-09-22 | Stop reason: HOSPADM

## 2020-09-22 RX ORDER — HYDROMORPHONE HYDROCHLORIDE 1 MG/ML
0.4 INJECTION, SOLUTION INTRAMUSCULAR; INTRAVENOUS; SUBCUTANEOUS
Status: DISCONTINUED | OUTPATIENT
Start: 2020-09-22 | End: 2020-09-22 | Stop reason: HOSPADM

## 2020-09-22 RX ORDER — OXYCODONE HCL 5 MG/5 ML
10 SOLUTION, ORAL ORAL
Status: DISCONTINUED | OUTPATIENT
Start: 2020-09-22 | End: 2020-09-22 | Stop reason: HOSPADM

## 2020-09-22 RX ORDER — OXYCODONE HCL 5 MG/5 ML
5 SOLUTION, ORAL ORAL
Status: DISCONTINUED | OUTPATIENT
Start: 2020-09-22 | End: 2020-09-22 | Stop reason: HOSPADM

## 2020-09-22 RX ORDER — LIDOCAINE HYDROCHLORIDE AND EPINEPHRINE 10; 10 MG/ML; UG/ML
INJECTION, SOLUTION INFILTRATION; PERINEURAL
Status: DISCONTINUED | OUTPATIENT
Start: 2020-09-22 | End: 2020-09-22 | Stop reason: HOSPADM

## 2020-09-22 RX ORDER — HALOPERIDOL 5 MG/ML
1 INJECTION INTRAMUSCULAR
Status: DISCONTINUED | OUTPATIENT
Start: 2020-09-22 | End: 2020-09-22 | Stop reason: HOSPADM

## 2020-09-22 RX ORDER — LIDOCAINE HYDROCHLORIDE 40 MG/ML
SOLUTION TOPICAL PRN
Status: DISCONTINUED | OUTPATIENT
Start: 2020-09-22 | End: 2020-09-22 | Stop reason: SURG

## 2020-09-22 RX ORDER — CLINDAMYCIN PHOSPHATE 150 MG/ML
INJECTION, SOLUTION INTRAVENOUS PRN
Status: DISCONTINUED | OUTPATIENT
Start: 2020-09-22 | End: 2020-09-22 | Stop reason: SURG

## 2020-09-22 RX ORDER — MEPERIDINE HYDROCHLORIDE 25 MG/ML
12.5 INJECTION INTRAMUSCULAR; INTRAVENOUS; SUBCUTANEOUS
Status: DISCONTINUED | OUTPATIENT
Start: 2020-09-22 | End: 2020-09-22 | Stop reason: HOSPADM

## 2020-09-22 RX ORDER — MIDAZOLAM HYDROCHLORIDE 1 MG/ML
INJECTION INTRAMUSCULAR; INTRAVENOUS PRN
Status: DISCONTINUED | OUTPATIENT
Start: 2020-09-22 | End: 2020-09-22 | Stop reason: SURG

## 2020-09-22 RX ORDER — ONDANSETRON 2 MG/ML
INJECTION INTRAMUSCULAR; INTRAVENOUS PRN
Status: DISCONTINUED | OUTPATIENT
Start: 2020-09-22 | End: 2020-09-22 | Stop reason: SURG

## 2020-09-22 RX ADMIN — SUGAMMADEX 150 MG: 100 INJECTION, SOLUTION INTRAVENOUS at 10:50

## 2020-09-22 RX ADMIN — MIDAZOLAM HYDROCHLORIDE 2 MG: 1 INJECTION, SOLUTION INTRAMUSCULAR; INTRAVENOUS at 09:56

## 2020-09-22 RX ADMIN — ROPIVACAINE HYDROCHLORIDE 15 ML: 5 INJECTION, SOLUTION EPIDURAL; INFILTRATION; PERINEURAL at 09:56

## 2020-09-22 RX ADMIN — ONDANSETRON 4 MG: 2 INJECTION INTRAMUSCULAR; INTRAVENOUS at 10:50

## 2020-09-22 RX ADMIN — PROPOFOL 150 MG: 10 INJECTION, EMULSION INTRAVENOUS at 10:13

## 2020-09-22 RX ADMIN — EPHEDRINE SULFATE 10 MG: 50 INJECTION INTRAMUSCULAR; INTRAVENOUS; SUBCUTANEOUS at 10:20

## 2020-09-22 RX ADMIN — DEXAMETHASONE SODIUM PHOSPHATE 8 MG: 4 INJECTION, SOLUTION INTRAMUSCULAR; INTRAVENOUS at 10:21

## 2020-09-22 RX ADMIN — FENTANYL CITRATE 100 MCG: 50 INJECTION, SOLUTION INTRAMUSCULAR; INTRAVENOUS at 10:13

## 2020-09-22 RX ADMIN — SODIUM CHLORIDE, POTASSIUM CHLORIDE, SODIUM LACTATE AND CALCIUM CHLORIDE: 600; 310; 30; 20 INJECTION, SOLUTION INTRAVENOUS at 09:58

## 2020-09-22 RX ADMIN — CEFAZOLIN 2 G: 1 INJECTION, POWDER, FOR SOLUTION INTRAVENOUS at 10:13

## 2020-09-22 RX ADMIN — EPHEDRINE SULFATE 10 MG: 50 INJECTION INTRAMUSCULAR; INTRAVENOUS; SUBCUTANEOUS at 10:37

## 2020-09-22 RX ADMIN — SODIUM CHLORIDE, POTASSIUM CHLORIDE, SODIUM LACTATE AND CALCIUM CHLORIDE: 600; 310; 30; 20 INJECTION, SOLUTION INTRAVENOUS at 10:57

## 2020-09-22 RX ADMIN — LIDOCAINE HYDROCHLORIDE 0.5 ML: 10 INJECTION, SOLUTION INFILTRATION; PERINEURAL at 09:58

## 2020-09-22 RX ADMIN — WATER 15 ML: 100 IRRIGANT IRRIGATION at 10:01

## 2020-09-22 RX ADMIN — LIDOCAINE HYDROCHLORIDE 60 MG: 20 INJECTION, SOLUTION EPIDURAL; INFILTRATION; INTRACAUDAL; PERINEURAL at 10:13

## 2020-09-22 RX ADMIN — CLINDAMYCIN PHOSPHATE 600 MG: 150 INJECTION, SOLUTION INTRAMUSCULAR; INTRAVENOUS at 10:18

## 2020-09-22 RX ADMIN — LIDOCAINE HYDROCHLORIDE 4 ML: 40 SOLUTION TOPICAL at 10:14

## 2020-09-22 RX ADMIN — ROCURONIUM BROMIDE 40 MG: 10 INJECTION, SOLUTION INTRAVENOUS at 10:13

## 2020-09-22 RX ADMIN — EPHEDRINE SULFATE 10 MG: 50 INJECTION INTRAMUSCULAR; INTRAVENOUS; SUBCUTANEOUS at 10:58

## 2020-09-22 ASSESSMENT — PAIN DESCRIPTION - PAIN TYPE: TYPE: ACUTE PAIN;SURGICAL PAIN

## 2020-09-22 NOTE — ANESTHESIA POSTPROCEDURE EVALUATION
Patient: Meggan Espinoza    Procedure Summary     Date: 09/22/20 Room / Location:  OR  / SURGERY [facility]    Anesthesia Start: 1010 Anesthesia Stop: 1107    Procedures:       DECOMPRESSION, SHOULDER, ARTHROSCOPIC - SUBACROMIAL, LABRAL DEBRIDEMENT (Right Shoulder)      ARTHROSCOPY, SHOULDER, WITH BICEPS TENODESIS (Right Shoulder) Diagnosis: (ADHESIVE CAPSULITISI OF RIGHT SHOULDER, SUPERIOR GLENOID LABRUM LESION OF RIGHT SHOULDER)    Surgeon: Toribio Coronado M.D. Responsible Provider: Jeyson Flores M.D.    Anesthesia Type: general, peripheral nerve block ASA Status: 2          Final Anesthesia Type: general, peripheral nerve block  Last vitals  BP   Blood Pressure: 140/87    Temp   36.1 °C (97 °F)    Pulse   Pulse: 81   Resp   20    SpO2   95 %      Anesthesia Post Evaluation    Patient location during evaluation: PACU  Patient participation: complete - patient participated  Level of consciousness: awake and alert    Airway patency: patent  Anesthetic complications: no  Cardiovascular status: hemodynamically stable  Respiratory status: acceptable  Hydration status: euvolemic    PONV: none           Nurse Pain Score: 0 (NPRS)

## 2020-09-22 NOTE — OR NURSING
1255 Report received from JESSI Khanna. Pt resting in a gurney s/p right shoulder arthroscopy. Surgical dressing to right shoulder. Immobilizer to RUE. Pt has no complaints.    1305 No changes.    1320 Attempted to wean pt to RA, pt unable to maintain oxygen saturation of at least 90%. Pt encouraged to DB&C.    1335 No changes. Report to JESSI Khanna.

## 2020-09-22 NOTE — OR NURSING
1352: Patient arrives in phase II on 1 lpm via nasal cannula and incentive Spirometer.    1407: Patient is currently on room air hovering between 90% and 92% with effort. Patient's  is at chairside in phase II helping  the patient to breath.    1415: Patient provided with supplemental oxygen via nasal cannula at 2 lpm, as room air SpO2 dropped back into the low 80'2    1420: O2 adjusted to 1 lpm via NC    1425: Patient at 93% on 1 lpm of Oxygen via NC.    1430: Patient coached with IS and not able to go above 500 mL on an inspirational breath.     1440: Spoke with Dr. Flores via the telephone who wants home oxygen set up for the Patient at this time so she may be discharged to home.    1500: Dr. Flores signed the request / order for the necessary home Oxygen. Patient voided.    1520: Both , Mckenzie and Alexa have been called and voice messages left.    1530: Ordered dinner tray for Patient.    1535: Alexa called back and will begin getting the process of obtaining home oxygen for the patient.    1540: [place] tray arrived for patient. Tray table set for patient to have her meal.  at chairside.     1615: Spoke with a representative at [employer] who stated the  is on his way and is coming from [place].    1620: Patient voided     1645: Patient states she is having 5/10 pain at present time,It is tolerable and the risks of decreasing current SpO2 are considered, Plan to medicate per MAR if the patient has a greater increase in pain.    1654: The Oxygen delivery has arrived at chairside with portable tank and concentrator. Education is being provided.     1715: D/Ji to care of  post stay in PACU 2. Home Oxygen unit is in the possession of the Patient and her  upon departure, educated on operation.

## 2020-09-22 NOTE — ANESTHESIA PROCEDURE NOTES
Airway    Date/Time: 9/22/2020 10:14 AM  Performed by: Jeyson Flores M.D.  Authorized by: Jeyson Flores M.D.     Location:  OR  Urgency:  Elective  Difficult Airway: No    Indications for Airway Management:  Anesthesia      Spontaneous Ventilation: absent    Sedation Level:  Deep  Preoxygenated: Yes    Patient Position:  Sniffing  Mask Difficulty Assessment:  1 - vent by mask  Final Airway Type:  Endotracheal airway  Final Endotracheal Airway:  ETT  Cuffed: Yes    Technique Used for Successful ETT Placement:  Direct laryngoscopy    Insertion Site:  Oral  Blade Type:  Elizabeth  Laryngoscope Blade/Videolaryngoscope Blade Size:  3  ETT Size (mm):  6.5  Measured from:  Teeth  ETT to Teeth (cm):  20  Placement Verified by: auscultation and capnometry    Cormack-Lehane Classification:  Grade I - full view of glottis  Number of Attempts at Approach:  1

## 2020-09-22 NOTE — ANESTHESIA PROCEDURE NOTES
Peripheral Block    Date/Time: 9/22/2020 9:56 AM  Performed by: Jeyson Flores M.D.  Authorized by: Jeyson Flores M.D.     Patient Location:  Pre-op  Start Time:  9/22/2020 9:56 AM  End Time:  9/22/2020 9:58 AM  Reason for Block: at surgeon's request and post-op pain management    patient identified, IV checked, site marked, risks and benefits discussed, surgical consent, monitors and equipment checked, pre-op evaluation and timeout performed    Patient Position:  Supine  Prep: ChloraPrep    Monitoring:  Heart rate, continuous pulse ox and cardiac monitor  Block Region:  Upper Extremity  Upper Extremity - Block Type:  BRACHIAL PLEXUS block, Interscalene approach    Laterality:  Right  Procedures: ultrasound guided  Image captured, interpreted and electronically stored.  Local Infiltration:  Lidocaine  Strength:  1 %  Dose:  3 ml  Block Type:  Single-shot  Needle Length:  50mm  Needle Gauge:  22 G  Needle Localization:  Ultrasound guidance  Injection Assessment:  Negative aspiration for heme, no paresthesia on injection, incremental injection and local visualized surrounding nerve on ultrasound  Evidence of intravascular injection: No     US Guided Interscalene Brachial Plexus Block   US transducer placed on the neck in oblique plane approximately at the level of C6.  Anterior and Middle Scalene (MSM) muscles identified with nerve trunks identified between the muscles.  Needle inserted lateral to probe and advanced under direct visualization through the MSM into a perineural position.  After negative aspiration, LA injected with ease and visualized surrounding the nerve trunks.

## 2020-09-22 NOTE — OR NURSING
1105: To PACU post right shoulder arthroscopy w/ block. OPA in place. Pt obstructed, ambu bag deployed by anesthesiologist.  1115: Pt breathing is now spontaneous and unobstructed, OPA remains. Palpable radial pulse observed on operative extremity.  1138: OPA dc'd, breathing is spontaneous and unlabored.  1144: Able to sense touch to fingers, but unable to move them. Denies pain.  1215: Remains pain/nausea free. Weaning 02.  1230: Pt given I.S. Pt is unable to process the I.S.  1254: No pain. Working on deep breathing.

## 2020-09-22 NOTE — DISCHARGE INSTRUCTIONS
ACTIVITY: Rest and take it easy for the first 24 hours.  A responsible adult is recommended to remain with you during that time.  It is normal to feel sleepy.  We encourage you to not do anything that requires balance, judgment or coordination.    MILD FLU-LIKE SYMPTOMS ARE NORMAL. YOU MAY EXPERIENCE GENERALIZED MUSCLE ACHES, THROAT IRRITATION, HEADACHE AND/OR SOME NAUSEA.    FOR 24 HOURS DO NOT:  Drive, operate machinery or run household appliances.  Drink beer or alcoholic beverages.   Make important decisions or sign legal documents.    SPECIAL INSTRUCTIONS: Activity is to be non weight bearing to operative extremity. Ice and elevate. See Dr. Coronado's printed instructions for further detail.    DIET: To avoid nausea, slowly advance diet as tolerated, avoiding spicy or greasy foods for the first day.  Add more substantial food to your diet according to your physician's instructions.  Babies can be fed formula or breast milk as soon as they are hungry.  INCREASE FLUIDS AND FIBER TO AVOID CONSTIPATION.    SURGICAL DRESSING/BATHING: Keep dressings clean and dry. You may remove dressings and shower in 3 days (Friday). See Dr. Coronado's printed instructions for further detail.      FOLLOW-UP APPOINTMENT:  A follow-up appointment should be arranged with your doctor in 7-10 days; call to schedule.    You should CALL YOUR PHYSICIAN if you develop:  Fever greater than 101 degrees F.  Pain not relieved by medication, or persistent nausea or vomiting.  Excessive bleeding (blood soaking through dressing) or unexpected drainage from the wound.  Extreme redness or swelling around the incision site, drainage of pus or foul smelling drainage.  Inability to urinate or empty your bladder within 8 hours.    You should call 911 if you develop problems with breathing or chest pain.    If you are unable to contact your doctor or surgical center, you should go to the nearest emergency room or urgent care center.       Physician's telephone #: Dr. Coronado [phone]    If any questions arise, call your doctor.  If your doctor is not available, please feel free to call the Surgical Center at [phone].  The Center is open Monday through Friday from 7AM to 7PM.      You can also call the [facility] HOTLINE open 24 hours/day, 7 days/week and speak to a nurse at [phone], or toll free at [phone].    A registered nurse may call you a few days after your surgery to see how you are doing after your procedure.    MEDICATIONS: Resume taking daily medication.  Take prescribed pain medication with food.  If no medication is prescribed, you may take non-aspirin pain medication if needed.  PAIN MEDICATION CAN BE VERY CONSTIPATING.  Take a stool softener or laxative such as senokot, pericolace, or milk of magnesia if needed.    Prescription given.      Last pain medication given: None.    If your physician has prescribed pain medication that includes Acetaminophen (Tylenol), do not take additional Acetaminophen (Tylenol) while taking the prescribed medication.    Peripheral Nerve Block Discharge Instructions from Same Day Surgery and Inpatient :    What to Expect - Upper Extremity  · You may experience numbness and weakness in shoulder, arm and hand  on the same side as your surgery  · This is normal. For some people, this may be an unpleasant sensation. Be very careful with your numb limb  · Ask for help when you need it  Shoulder Surgery Side Effects  · In addition to numbness and weakness you may experience other symptoms  · Other nerves that are close to those nerves injected can also be affected by local anesthesia  · You may experience a hoarseness in your voice  · Your breathing may feel different  · You may also notice drooping of your eyelid, pupil constriction, and decreased sweating, on the side of your surgery  · All of these side effects are normal and will resolve when the local anesthetic wears off  "  Prevent Injury  · Protect the limb like a baby  · Beware of exposing your limb to extreme heat or cold or trauma  · The limb may be injured without you noticing because it is numb  · Keep the limb elevated whenever possible  · Do not sleep on the limb  · Change the position of the limb regularly  · Avoid putting pressure on your surgical limb  Pain Control  · The initial block on the day of surgery will make your extremity feel \"numb\"  · Any consecutive injection including prior to discharge from the hospital will make your extremity feel \"numb\"  · You may feel an aching or burning when the local anesthesia starts to wear off  · Take pain pills as prescribed by your surgeon  · Call your surgeon or anesthesiologist if you do not have adequate pain control        Depression / Suicide Risk    As you are discharged from this Critical access hospital facility, it is important to learn how to keep safe from harming yourself.    Recognize the warning signs:  · Abrupt changes in personality, positive or negative- including increase in energy   · Giving away possessions  · Change in eating patterns- significant weight changes-  positive or negative  · Change in sleeping patterns- unable to sleep or sleeping all the time   · Unwillingness or inability to communicate  · Depression  · Unusual sadness, discouragement and loneliness  · Talk of wanting to die  · Neglect of personal appearance   · Rebelliousness- reckless behavior  · Withdrawal from people/activities they love  · Confusion- inability to concentrate     If you or a loved one observes any of these behaviors or has concerns about self-harm, here's what you can do:  · Talk about it- your feelings and reasons for harming yourself  · Remove any means that you might use to hurt yourself (examples: pills, rope, extension cords, firearm)  · Get professional help from the community (Mental Health, Substance Abuse, psychological counseling)  · Do not be alone:Call your Safe Contact- " someone whom you trust who will be there for you.  · Call your local CRISIS HOTLINE 683-6908 or 758-675-8926  · Call your local Children's Mobile Crisis Response Team [place] (300) 800-2016 or www.Motor2  · Call the toll free National Suicide Prevention Hotlines   · National Suicide Prevention Lifeline 217-079-AZEB (5544)  National shoply Line Network 800-SUICIDE (745-7184)

## 2020-09-22 NOTE — OP REPORT
DATE OF SERVICE:  09/22/2020    SURGEON:  Toribio Coronado MD    ASSISTANT:  Zayda Miller PA-C    ANESTHESIOLOGIST:  Jeyson Floers MD    ANESTHESIA:  General anesthesia with single shot interscalene block.      PREOPERATIVE DIAGNOSES:    1.  Right shoulder adhesive capsulitis/frozen shoulder -- resolved.    2.  Right shoulder labrum/superior labrum anterior and posterior tear.    3.  Right shoulder synovitis.    4.  Right shoulder biceps tenosynovitis.    5.  Right shoulder subacromial impingement/bursitis.      POSTOPERATIVE DIAGNOSES:    1.  Right shoulder adhesive capsulitis/frozen shoulder -- resolved.    2.  Right shoulder labrum/superior labrum anterior and posterior tear.    3.  Right shoulder synovitis.    4.  Right shoulder biceps tenosynovitis.    5.  Right shoulder subacromial impingement/bursitis.      PROCEDURES PERFORMED:    1.  Right shoulder arthroscopy.    2.  Right shoulder labral debridement/extensive debridement.    3.  Right shoulder synovectomy.    4.  Right shoulder biceps tenotomy and open subpectoral tenodesis.    5.  Right shoulder subacromial decompression.      IMPLANTS:  Mcdowell [employer] Nephew 2.8 mm Q-Fix anchor x1.      HISTORY OF PRESENT ILLNESS:  Patient is a very pleasant and active 50-year-old   female who was initially diagnosed about a year ago without a frozen   shoulder.  She was treated conservatively and regained her motion.  She   continued to have significant pain.  She failed conservative management.  We   discussed surgical intervention.  Patient has been n.p.o. since midnight.  She   is medically cleared for surgery by the anesthesia team.      INFORMED CONSENT:  Patient was informed of the risks, benefits, and   alternatives to planned operation.  The risks include but not limited to   bleeding, infection, neurovascular damage, osteoarthritis/cartilage damage,   pain, recurrent stiffness, DVT, PE, MI, stroke, and death.  Advanced   directives were reviewed.  After  answering all questions, the patient elected   to proceed with planned operation and informed consent form was signed.      DESCRIPTION OF PROCEDURE:  Patient was identified in the preoperative holding   area.  The correct procedural side and site were identified and marked.  The   patient was then brought to the operating room and transferred to the   operating room table.  She received single shot interscalene block by the   anesthesia team followed by general anesthesia.      The patient was then carefully placed in the beachchair position with all bony   prominences being well padded.  Examination of the shoulder at this point in   time demonstrated no obvious deformity.  There is passive forward flexion,   abduction to 160.  Passive ER with the arm at the side was to 45 degrees.    With the arm at 90 degrees of abduction, passive ER was to 95 degrees and   passive IR was to 25 degrees.      The right shoulder was then cleaned with several alcohol-soaked gauzes and   subacromial space injected with 30 mL of 1% lidocaine with epinephrine.  The   right upper extremity was then prepped and draped in normal standard sterile   fashion.  A procedural pause was then performed by the operating room team.    Patient was given IV antibiotics prior to incision.      I then began with a diagnostic arthroscopy via standard posterior and anterior   portals.  There was extensive and diffuse synovitis of the glenohumeral   joint.  There was extensive tearing of the posterior, superior and anterior   labrum with extension of long head of the biceps tendon.  There were quite a   bit of tearing of the biceps as it exited the joint as well as extensive   erythema along its superior border.  Little bit of grade I softening over the   glenoid, but no significant cartilage pathology of the humeral head.  No   obvious loose bodies or soft debris within the inferior axillary recess.  The   subscapularis appeared to be intact at its  insertion onto the lesser   tuberosity.  Examination of the articular side of the rotator cuff   demonstrated no evidence of partial thickness or full thickness tears.    Examination of the subacromial space demonstrated extensive inflammation and   synovitis of the subacromial bursa with thick bands extending all the way down   to the subdeltoid space.  There was a prominent anterolateral acromial spur.    No evidence of a bursal-sided rotator cuff tear.      I first performed a synovectomy throughout the glenohumeral joint and then   performed an extensive debridement of the posterior, superior and anterior   labrum.  Biceps tenotomy was then performed at its insertion onto the superior   labrum and the tendon was allowed to retract distally out of the joint.    Probing of the remaining labral tissue demonstrated it was nice and stable.    All instruments were then removed.      A 2.5 cm longitudinal incision was made over the anterior aspect of the upper   arm.  Dissection was carried down and the underlying fascia was incised.  The   long head of the biceps tendon was identified and pulled out of the wound.    The proximal part of the tendon was excised, leaving about 1.5 cm of tendon   intact.  I then placed a single Smith and Nephew 2.8 mm Q-Fix anchor onto the   anterior part of the proximal humerus at the distal part of the bicipital   groove, immediately posterior to the inferior border of the pectoralis major   tendon.  The remaining biceps tendon was then secured resulting in excellent   fixation and recreation of normal length-tension relationship.  The wound was   then copiously irrigated and meticulous hemostasis obtained.      The trocar and camera were placed back in the posterior portal into the   subacromial space, followed by a 30-degree scope.  I then created an accessory   portal off the lateral part of the acromion under direct visualization.  I   then proceeded with a full bursectomy.  The  undersurface of the acromion was   identified and anterolateral acromioplasty was performed, utilizing a cutting   block technique.  I once again noted no evidence of a bursal-sided rotator   cuff tear.      The portal incisions were then copiously irrigated and closed with simple 3-0   Prolene suture followed by Steri-Strips and Xeroform.  The tenodesed wound was   then closed in a layered fashion with Monocryl followed by Steri-Strips and   Xeroform.  A sterile compressive dressing was applied followed by a   well-padded shoulder abduction sling.      Needle and sponge counts were correct at the end of the procedure as reported   by the circulating nurse.  The patient tolerated the procedure well without   complications.  The patient was then transferred off the operating room table   onto the regular hospital bed.      ESTIMATED BLOOD LOSS:  5 mL.     COMPLICATIONS:  None.      TOURNIQUET TIME:  None.      SPECIMENS:  None.      WOUND TYPE:  Type 1 clean.      POSTOPERATIVE PLAN:  Patient will be transferred back to postoperative unit.    I expect she will be discharged from the hospital later this morning once   mobilizing safely and tolerating oral medications.  She will remain   nonweightbearing to the right upper extremity.  We will get her back into   physical therapy on postoperative day #1 to make sure she keeps her range of   motion.       ____________________________________     MD DENISE Okeefe / LIZETTE    DD:  09/22/2020 10:57:48  DT:  09/22/2020 11:28:43    D#:  [id number]  Job#:  [id number]

## 2020-09-22 NOTE — ANESTHESIA TIME REPORT
Anesthesia Start and Stop Event Times     Date Time Event    9/22/2020 0958 Ready for Procedure     1010 Anesthesia Start     1107 Anesthesia Stop        Responsible Staff  09/22/20    Name Role Begin End    Jeyson Flores M.D. Anesth 1010 1107        Preop Diagnosis (Free Text):  Pre-op Diagnosis     ADHESIVE CAPSULITISI OF RIGHT SHOULDER, SUPERIOR GLENOID LABRUM LESION OF RIGHT SHOULDER        Preop Diagnosis (Codes):    Post op Diagnosis  Adhesive capsulitis of right shoulder      Premium Reason  Non-Premium    Comments:

## 2020-09-22 NOTE — OR NURSING
Patient to preop, allergies and NPO status verified, home medications reconciled, belongings secured, verbalizes understanding of pain scale, surgical site verified, IV access established, SCDs/ SHELLY hose in place, triple aim completed.

## 2020-09-22 NOTE — DISCHARGE PLANNING
Received a call from [facility] in [facility] PACU stating that this patient needs O2 to be discharged. Spoke with Liliya, PACU Supervisor, she is aware of the late hour and that the patient may have to self pay due to lacking a respiratory history. O2 sats in the high 80's. Facesheet, orders, F2F, H&P, N.N. faxed to [facility]. [facility] directly for ETA and estimate if needed.

## 2020-09-22 NOTE — ANESTHESIA PREPROCEDURE EVALUATION
Relevant Problems   CARDIAC   (+) HTN (hypertension)   (+) Migraine with aura, intractable, without status migrainosus      GI   (+) GERD (gastroesophageal reflux disease)      Other   (+) DM (diabetes mellitus) (HCC)       Physical Exam    Airway   Mallampati: II  TM distance: >3 FB  Neck ROM: full       Cardiovascular - normal exam  Rhythm: regular  Rate: normal  (-) murmur     Dental - normal exam           Pulmonary - normal exam  Breath sounds clear to auscultation     Abdominal    Neurological - normal exam                 Anesthesia Plan    ASA 2       Plan - general and peripheral nerve block     Peripheral nerve block will be post-op pain control  Airway plan will be ETT        Induction: intravenous    Postoperative Plan: Postoperative administration of opioids is intended.    Pertinent diagnostic labs and testing reviewed    Informed Consent:    Anesthetic plan and risks discussed with patient.

## 2020-10-10 DIAGNOSIS — G43.119 MIGRAINE WITH AURA, INTRACTABLE, WITHOUT STATUS MIGRAINOSUS: ICD-10-CM

## 2020-10-14 RX ORDER — GALCANEZUMAB 120 MG/ML
INJECTION, SOLUTION SUBCUTANEOUS
Qty: 1 EACH | Refills: 5 | Status: SHIPPED | OUTPATIENT
Start: 2020-10-14 | End: 2021-04-05

## 2020-12-14 ENCOUNTER — OFFICE VISIT (OUTPATIENT)
Dept: NEUROLOGY | Facility: MEDICAL CENTER | Age: 50
End: 2020-12-14
Payer: MEDICAID

## 2020-12-14 VITALS
DIASTOLIC BLOOD PRESSURE: 80 MMHG | SYSTOLIC BLOOD PRESSURE: 124 MMHG | OXYGEN SATURATION: 100 % | HEART RATE: 60 BPM | TEMPERATURE: 97.6 F | BODY MASS INDEX: 35.2 KG/M2 | WEIGHT: 152.12 LBS | HEIGHT: 55 IN

## 2020-12-14 DIAGNOSIS — G43.119 MIGRAINE WITH AURA, INTRACTABLE, WITHOUT STATUS MIGRAINOSUS: Primary | ICD-10-CM

## 2020-12-14 PROCEDURE — 99213 OFFICE O/P EST LOW 20 MIN: CPT | Performed by: PSYCHIATRY & NEUROLOGY

## 2020-12-14 RX ORDER — UBROGEPANT 100 MG/1
1 TABLET ORAL PRN
Qty: 10 TAB | Refills: 5 | Status: SHIPPED | OUTPATIENT
Start: 2020-12-14 | End: 2022-01-04 | Stop reason: SDUPTHER

## 2020-12-14 ASSESSMENT — PATIENT HEALTH QUESTIONNAIRE - PHQ9: CLINICAL INTERPRETATION OF PHQ2 SCORE: 0

## 2020-12-14 ASSESSMENT — ENCOUNTER SYMPTOMS
SPEECH CHANGE: 1
HEADACHES: 1
MEMORY LOSS: 1

## 2020-12-15 NOTE — PROGRESS NOTES
"Subjective:      Meggan Espinoza is a 50 y.o. female who presents for follow-up, with a history of migraine with aura, but who continues to have complaints of cognitive slowing and word finding difficulties.    HPI    Meggan states that the headaches have continued with the same pattern of control.  On Trokendi 200 mg every evening along with Emgality 120 mg injections every 4 weeks, she has very good headache control for the first 3 weeks, the headaches beginning to roll back in for several days in the week prior to her next injection.  With each injection, headache control improved within the first day or so.  She tolerates the injections without issue.  Imitrex 100 mg works consistently as rescue.    The cognitive issues remain, she describes very specific issues with word finding and mental processing.  Though things have not worsened, it has become frustrating to say the least.  Comprehension is less curtailed but she does note a slowing with mental processing in certain circumstances.    Medical, surgical and family histories are reviewed, there are no new drug allergies.  She is on Emgality 120 mg injections every 4 weeks, Trokendi  mg every evening, Imitrex 100 mg as needed, along with Lipitor, glargine insulin, Pepcid, Prinivil, Neurontin 800 mg, 3 times daily, Glucophage and trazodone.    Review of Systems   Constitutional: Negative for malaise/fatigue.   Neurological: Positive for speech change and headaches.   Psychiatric/Behavioral: Positive for memory loss.   All other systems reviewed and are negative.       Objective:     /80 (BP Location: Right arm, Patient Position: Sitting, BP Cuff Size: Adult)   Pulse 60   Temp 36.4 °C (97.6 °F) (Temporal)   Ht (!) 0.104 m (4.11\")   Wt 69 kg (152 lb 1.9 oz)   LMP 07/02/2015   SpO2 100%   BMI 6331.38 kg/m²      Physical Exam    She appears in no acute distress.  Vital signs are stable.  She is quite cooperative, very pleasant in spirit and " "demeanor.  There is no malar rash or jaw claudication.  The neck is supple.  Cardiac evaluation is unremarkable.    She is fully oriented, there is no aphasia, though the word finding difficulties do come up every once in a while.  She gets frustrated.  Mental processing can be a little slow, but she is still appropriate, no clear deficit otherwise is found.    Otherwise, in quick and cursory fashion, cranial nerve, musculoskeletal and coordination evaluations are intact.     Assessment/Plan:     1. Migraine with aura, intractable, without status migrainosus  The headaches are stabilized, but during this week when headaches begin to recur prior to her next injection, Ubrelvy 100 mg will be taken daily.  A rescue CGRP drug, it can be taken daily as a \"bridge\" for patients whose headaches begin to increase just before their next injection given every 4 weeks.  She was told how to do this and also was reassured that Imitrex can still be used as the final rescue during this time.  If the Ubrelvy works, there should be no Imitrex required.    The cognitive symptoms have more to do with the Trokendi/topiramate dosing.  She does not have a progressive aphasia or other degenerative dementia, this clearly sounds like a side effect of the drug itself.  She was reassured that the symptoms she has will resolve once the drug is discontinued, and though I talked about reducing the dose slightly to see if side effects resolve, she would rather stick with it since it does seem to help the headaches.  We can follow-up in 1 year.    - Ubrogepant (UBRELVY) 100 MG Tab; Take 1 Tab by mouth as needed (1 tab at headache osnet, repeat in 2 hour prn).  Dispense: 10 Tab; Refill: 5    Time: 20-minute spent face-to-face for exam, review, discussion, and education, of this over 50% of the time spent counseling and coordinating care.  "

## 2021-02-01 DIAGNOSIS — G43.119 MIGRAINE WITH AURA, INTRACTABLE, WITHOUT STATUS MIGRAINOSUS: ICD-10-CM

## 2021-02-01 RX ORDER — TOPIRAMATE 200 MG/1
CAPSULE, EXTENDED RELEASE ORAL
Qty: 30 CAP | Refills: 6 | Status: SHIPPED | OUTPATIENT
Start: 2021-02-01 | End: 2021-09-28

## 2021-02-01 NOTE — TELEPHONE ENCOUNTER
Received request via: Pharmacy    Was the patient seen in the last year in this department? Yes     Does the patient have an active prescription (recently filled or refills available) for medication(s) requested? Yes.

## 2021-03-26 ENCOUNTER — TELEPHONE (OUTPATIENT)
Dept: NEUROLOGY | Facility: MEDICAL CENTER | Age: 51
End: 2021-03-26

## 2021-03-26 NOTE — TELEPHONE ENCOUNTER
The pt called and stated she needs a prior authorization for Trokendi and she will have the pharmacy send the start form.

## 2021-04-03 DIAGNOSIS — G43.119 MIGRAINE WITH AURA, INTRACTABLE, WITHOUT STATUS MIGRAINOSUS: ICD-10-CM

## 2021-04-05 RX ORDER — GALCANEZUMAB 120 MG/ML
INJECTION, SOLUTION SUBCUTANEOUS
Qty: 1 EACH | Refills: 5 | Status: SHIPPED | OUTPATIENT
Start: 2021-04-05 | End: 2021-10-11

## 2021-04-05 NOTE — TELEPHONE ENCOUNTER
Received request via: Pharmacy    Was the patient seen in the last year in this department? Yes    Does the patient have an active prescription (recently filled or refills available) for medication(s) requested? No     Patient last seen on 12/14/2020 , medication last filled on 10/14/2020    Patient has a scheduled follow up on 12/20/2021.

## 2021-06-01 DIAGNOSIS — G43.119 MIGRAINE WITH AURA, INTRACTABLE, WITHOUT STATUS MIGRAINOSUS: ICD-10-CM

## 2021-06-01 RX ORDER — SUMATRIPTAN 100 MG/1
TABLET, FILM COATED ORAL
Qty: 9 TABLET | Refills: 11 | Status: SHIPPED | OUTPATIENT
Start: 2021-06-01 | End: 2022-04-13

## 2021-06-01 NOTE — TELEPHONE ENCOUNTER
Received request via: Pharmacy    Was the patient seen in the last year in this department? Yes    Does the patient have an active prescription (recently filled or refills available) for medication(s) requested? No     Patient was last seen on 12/14/2020 , medication last filled on 05/18/2020.

## 2021-08-11 ENCOUNTER — HOSPITAL ENCOUNTER (OUTPATIENT)
Dept: RADIOLOGY | Facility: MEDICAL CENTER | Age: 51
End: 2021-08-11
Attending: STUDENT IN AN ORGANIZED HEALTH CARE EDUCATION/TRAINING PROGRAM
Payer: MEDICAID

## 2021-08-11 DIAGNOSIS — M54.2 NECK PAIN: ICD-10-CM

## 2021-08-11 DIAGNOSIS — M54.50 LUMBAR PAIN: ICD-10-CM

## 2021-08-11 PROCEDURE — 72110 X-RAY EXAM L-2 SPINE 4/>VWS: CPT

## 2021-08-11 PROCEDURE — 72050 X-RAY EXAM NECK SPINE 4/5VWS: CPT

## 2021-08-31 ENCOUNTER — APPOINTMENT (OUTPATIENT)
Dept: RADIOLOGY | Facility: MEDICAL CENTER | Age: 51
End: 2021-08-31
Attending: STUDENT IN AN ORGANIZED HEALTH CARE EDUCATION/TRAINING PROGRAM
Payer: MEDICAID

## 2021-08-31 DIAGNOSIS — M54.6 THORACIC BACK PAIN, UNSPECIFIED BACK PAIN LATERALITY, UNSPECIFIED CHRONICITY: ICD-10-CM

## 2021-08-31 DIAGNOSIS — M54.50 LUMBAR PAIN: ICD-10-CM

## 2021-08-31 DIAGNOSIS — M54.2 NECK PAIN: ICD-10-CM

## 2021-08-31 PROCEDURE — 72146 MRI CHEST SPINE W/O DYE: CPT

## 2021-08-31 PROCEDURE — 72148 MRI LUMBAR SPINE W/O DYE: CPT

## 2021-08-31 PROCEDURE — 72141 MRI NECK SPINE W/O DYE: CPT

## 2021-09-28 DIAGNOSIS — G43.119 MIGRAINE WITH AURA, INTRACTABLE, WITHOUT STATUS MIGRAINOSUS: ICD-10-CM

## 2021-09-28 RX ORDER — TOPIRAMATE 200 MG/1
CAPSULE, EXTENDED RELEASE ORAL
Qty: 30 CAPSULE | Refills: 6 | Status: SHIPPED | OUTPATIENT
Start: 2021-09-28 | End: 2022-01-04 | Stop reason: SDUPTHER

## 2021-09-28 NOTE — TELEPHONE ENCOUNTER
Received request via: Pharmacy    Was the patient seen in the last year in this department? Yes 12/14/20    Does the patient have an active prescription (recently filled or refills available) for medication(s) requested? No

## 2021-11-10 PROBLEM — G56.03 CARPAL TUNNEL SYNDROME ON BOTH SIDES: Status: ACTIVE | Noted: 2021-11-10

## 2021-12-20 ENCOUNTER — APPOINTMENT (OUTPATIENT)
Dept: NEUROLOGY | Facility: MEDICAL CENTER | Age: 51
End: 2021-12-20
Payer: MEDICAID

## 2022-01-04 ENCOUNTER — OFFICE VISIT (OUTPATIENT)
Dept: NEUROLOGY | Facility: MEDICAL CENTER | Age: 52
End: 2022-01-04
Attending: PSYCHIATRY & NEUROLOGY
Payer: MEDICAID

## 2022-01-04 VITALS
SYSTOLIC BLOOD PRESSURE: 110 MMHG | OXYGEN SATURATION: 96 % | WEIGHT: 160.94 LBS | DIASTOLIC BLOOD PRESSURE: 70 MMHG | HEART RATE: 86 BPM | HEIGHT: 59 IN | RESPIRATION RATE: 16 BRPM | BODY MASS INDEX: 32.44 KG/M2 | TEMPERATURE: 98.5 F

## 2022-01-04 DIAGNOSIS — G43.119 MIGRAINE WITH AURA, INTRACTABLE, WITHOUT STATUS MIGRAINOSUS: Primary | ICD-10-CM

## 2022-01-04 DIAGNOSIS — R27.0 ATAXIA: ICD-10-CM

## 2022-01-04 PROCEDURE — 99215 OFFICE O/P EST HI 40 MIN: CPT | Performed by: PSYCHIATRY & NEUROLOGY

## 2022-01-04 RX ORDER — UBROGEPANT 100 MG/1
1 TABLET ORAL PRN
Qty: 10 TABLET | Refills: 5 | Status: SHIPPED | OUTPATIENT
Start: 2022-01-04 | End: 2022-04-13 | Stop reason: SDUPTHER

## 2022-01-04 RX ORDER — GALCANEZUMAB 120 MG/ML
INJECTION, SOLUTION SUBCUTANEOUS
Qty: 1 ML | Refills: 11 | Status: SHIPPED | OUTPATIENT
Start: 2022-01-04 | End: 2022-04-13 | Stop reason: SDUPTHER

## 2022-01-04 RX ORDER — TOPIRAMATE 200 MG/1
220 CAPSULE, EXTENDED RELEASE ORAL EVERY EVENING
Qty: 90 CAPSULE | Refills: 3 | Status: SHIPPED | OUTPATIENT
Start: 2022-01-04 | End: 2022-01-04

## 2022-01-04 RX ORDER — TOPIRAMATE 200 MG/1
200 CAPSULE, EXTENDED RELEASE ORAL EVERY EVENING
Qty: 90 CAPSULE | Refills: 3 | Status: SHIPPED | OUTPATIENT
Start: 2022-01-04 | End: 2022-05-06

## 2022-01-04 RX ORDER — SUMATRIPTAN 100 MG/1
TABLET, FILM COATED ORAL
Qty: 9 TABLET | Refills: 11 | Status: CANCELLED | OUTPATIENT
Start: 2022-01-04

## 2022-01-04 ASSESSMENT — PATIENT HEALTH QUESTIONNAIRE - PHQ9: CLINICAL INTERPRETATION OF PHQ2 SCORE: 0

## 2022-01-04 ASSESSMENT — ENCOUNTER SYMPTOMS
LOSS OF CONSCIOUSNESS: 0
SEIZURES: 0
FOCAL WEAKNESS: 0
MEMORY LOSS: 0
FALLS: 1

## 2022-01-04 ASSESSMENT — PAIN SCALES - GENERAL: PAINLEVEL: 7=MODERATE-SEVERE PAIN

## 2022-01-05 ENCOUNTER — TELEPHONE (OUTPATIENT)
Dept: NEUROLOGY | Facility: MEDICAL CENTER | Age: 52
End: 2022-01-05

## 2022-01-05 NOTE — PROGRESS NOTES
Subjective     Meggan Espinoza is a 52 y.o. female who presents for routine follow-up, with a history of migraine with aura, but who has noted over the last couple of months difficulty with walking and frequent falling.     HPI    Migraine: Still fairly well controlled with her combination of Trokendi XR and Emgality injections, she will use Ubrelvy with good benefit.  The headache frequency and duration has not changed.  She may reach for the migraine rescue every week, at its worst, otherwise she can have very few to no headaches for weeks at a time.    She still has some of the cognitive slowing on the Trokendi itself, she denies dysesthesias or paresthesias in the feet fingers or around the mouth.  There is no taste distortion.  The Emgality works consistently, there is no pattern with headache recurrence leading up to her next injection.  She has no issues with injection site discomfort.  She has stopped using Imitrex for rescue, Ubrelvy providing the most consistent benefit.  On a rare occasion she may take a second tablet if the first proves ineffective.    Balance difficulties: Over the last couple of months she describes episodes where she would be walking and then suddenly falls to the ground.  She is awake and alert.  She can stand back up with some assistance from others.  When walking she is now feeling more unsteady.  There is no clear directionality.  She can stand up from a chair without issue, she does not shuffle, she is not tripping with any regularity.  When walking up and down stairs, she has to keep her eye on the ground, she uses a banister more often.    Cognition has been intact, there is no slurring of speech, she denies loss of smell, drooling, etc.  There is no tremulousness with the voice or her hands.  She has some loss of dexterity with her hands on an occasion.  Bowel and bladder function are maintained.  She denies double vision or visual loss.  She has chronic low back pain,  "this has not worsened, she denies any sciatic, radiating symptoms when she collapses.  She denies constant paresthesias or dysesthesias in the feet, there is no analgesia.    Medical, surgical and family histories are reviewed, there are no new drug allergies.  She has been on gabapentin 800 mg, 3 times daily for quite some time.  Other than the gabapentin, she is on Emgality 120 mg every 4 weeks, Trokendi  mg every evening, Ubrelvy 100 mg as needed, Lipitor, Pepcid, Glucophage, trazodone and glargine insulin.    Review of Systems   Musculoskeletal: Positive for falls.   Neurological: Negative for focal weakness, seizures and loss of consciousness.   Psychiatric/Behavioral: Negative for memory loss.   All other systems reviewed and are negative.    Objective     /70 (BP Location: Right arm, Patient Position: Sitting, BP Cuff Size: Adult)   Pulse 86   Temp 36.9 °C (98.5 °F) (Temporal)   Resp 16   Ht 1.499 m (4' 11\")   Wt 73 kg (160 lb 15 oz)   LMP 07/02/2015   SpO2 96%   BMI 32.51 kg/m²      Physical Exam    She appears in no acute distress.  Vital signs are stable.  There is no malar rash.  Her neck is supple.  There is no sialorrhea.  Cardiac evaluation is unremarkable.  There is no lower extremity edema.    Neurological Exam    Fully oriented, there is no aphasia, apraxia, or inattention.    PERRLA/EOMI, there is no hypophonia or bradykinesia, eye blink frequency is maintained bilaterally.  Visual fields are full bilaterally.  Facial movements are symmetric, sensory exam is intact to temperature and light touch bilaterally.  The tongue and uvula are midline.  There is no dysarthria.  Shoulder shrug and head rotation are intact and symmetric.  Neck flexion and extension are normal.    Musculoskeletal exam reveals normal tone throughout, there is no tremor, asterixis, or drift.  Strength is 5/5 throughout, attention paid to the lower extremities.  Reflexes are hypoactive, ankle jerks absent " bilaterally.  The toes are downgoing bilaterally.    She stands slowly but can do so independently.  She walks with a tentative pattern, almost a toe-heel initiation.  Station is normal.  She does not shuffle.  Armswing is maintained, there is no tremor seen with the hands.  Repetitive movements bilaterally show normal and maintained amplitude and frequencies throughout.  There is no appendicular dystaxia throughout.  She does not look at the ground to maintain balance.  Still, tandem walking is curtailed.  Heel and toe walking are normal.    Sensory exam is intact to temperature, Romberg is absent.  Vibration is slightly diminished below the ankles.  JPS is intact.    Assessment & Plan     1. Migraine with aura, intractable, without status migrainosus  Stable, we can continue her present regimen unchanged. None of the medications she is using should affect balance.  The migraines themselves are not a cause for the balance difficulties she is presently experiencing.    - Galcanezumab-gnlm (EMGALITY) 120 MG/ML Solution Auto-injector; INJECT 1 PEN SUBCUTANEOUSLY AS INSTRUCTED EVERY 4 WEEKS  Dispense: 1 mL; Refill: 11  - TROKENDI  MG CAPSULE SR 24 HR; Take 1 Capsule 24 hour sustained release by mouth every evening.  Dispense: 90 Capsule; Refill: 3  - Ubrogepant (UBRELVY) 100 MG Tab; Take 1 Tablet by mouth as needed (1 tab at headache osnet, repeat in 2 hour prn).  Dispense: 10 Tablet; Refill: 5    2. Ataxia  Unclear as to cause, there are no findings on examination that would easily allow a diagnosis to be established.  MRI the brain will be performed for thoroughness.  We talked about some gait stabilization with physical therapy, she will think about this.  She has no clear stigmata suggesting myelopathy, EPS dysfunction, NPH, cerebrovascular disease, etc.  She has a neuropathy from her diabetes but nothing severe enough that would suggest a sensory ataxia.  There is no weakness in the lower extremities.  There  does seem to be a mild apractic quality to her gait, suggesting bifrontal disease, but again this is subtle at worst.  We will call her with the results of the MRI if significant, otherwise we can talk specifics at her follow-up visit.  She will let me know if she would like to be seen for some gait training.    - MR-BRAIN-W/O; Future    Time: A total of 40 minutes is spent on patient care including precharting, record review, discussions with healthcare staff and documentation.  This includes the face-to-face time with the patient for exam, review, education, as well as counseling and coordinating care.

## 2022-01-05 NOTE — TELEPHONE ENCOUNTER
Ubrelvy 100mg Tablet    PA submitted via [facility] (Key: [id number]) awaiting response TAT 24-72 hrs. - 01/05/2022 9:14am    Emgality 120mg/ml SOAJ    Request rec'd via [facility], ran TC via [place], TC paid copay $0.00 #1ml/30 DS Max 30 DS notifying liaison Estela Medina. - 01/05/2022 9:16am

## 2022-01-19 ENCOUNTER — HOSPITAL ENCOUNTER (OUTPATIENT)
Dept: RADIOLOGY | Facility: MEDICAL CENTER | Age: 52
End: 2022-01-19
Attending: PSYCHIATRY & NEUROLOGY
Payer: MEDICAID

## 2022-01-19 DIAGNOSIS — R27.0 ATAXIA: ICD-10-CM

## 2022-01-19 PROCEDURE — 70551 MRI BRAIN STEM W/O DYE: CPT

## 2022-01-24 PROBLEM — G56.02 LEFT CARPAL TUNNEL SYNDROME: Status: ACTIVE | Noted: 2022-01-24

## 2022-02-01 ENCOUNTER — APPOINTMENT (OUTPATIENT)
Dept: ADMISSIONS | Facility: MEDICAL CENTER | Age: 52
End: 2022-02-01
Payer: MEDICAID

## 2022-02-04 ENCOUNTER — PRE-ADMISSION TESTING (OUTPATIENT)
Dept: ADMISSIONS | Facility: MEDICAL CENTER | Age: 52
End: 2022-02-04
Attending: ORTHOPAEDIC SURGERY
Payer: MEDICAID

## 2022-02-04 DIAGNOSIS — Z01.810 PRE-OPERATIVE CARDIOVASCULAR EXAMINATION: ICD-10-CM

## 2022-02-04 DIAGNOSIS — Z01.812 PRE-OPERATIVE LABORATORY EXAMINATION: ICD-10-CM

## 2022-02-04 RX ORDER — MONTELUKAST SODIUM 4 MG/1
1 TABLET, CHEWABLE ORAL DAILY
COMMUNITY

## 2022-02-04 NOTE — OR NURSING
Spoke with patient by telephone for preadmission appointment.  She stated that she had mild Covid symptoms starting 1/25/22 and had a positive test at the [facility] clinic on 1/28/22.  Results are in media.  Patient's surgery is 21 days from start of mild symptoms.

## 2022-02-11 ENCOUNTER — PRE-ADMISSION TESTING (OUTPATIENT)
Dept: ADMISSIONS | Facility: MEDICAL CENTER | Age: 52
End: 2022-02-11
Attending: ORTHOPAEDIC SURGERY
Payer: MEDICAID

## 2022-02-11 DIAGNOSIS — Z01.810 PRE-OPERATIVE CARDIOVASCULAR EXAMINATION: ICD-10-CM

## 2022-02-11 DIAGNOSIS — G56.03 CARPAL TUNNEL SYNDROME ON BOTH SIDES: ICD-10-CM

## 2022-02-11 DIAGNOSIS — Z01.812 PRE-OPERATIVE LABORATORY EXAMINATION: ICD-10-CM

## 2022-02-11 LAB
ANION GAP SERPL CALC-SCNC: 14 MMOL/L (ref 7–16)
BUN SERPL-MCNC: 24 MG/DL (ref 8–22)
CALCIUM SERPL-MCNC: 9.1 MG/DL (ref 8.5–10.5)
CHLORIDE SERPL-SCNC: 105 MMOL/L (ref 96–112)
CO2 SERPL-SCNC: 18 MMOL/L (ref 20–33)
CREAT SERPL-MCNC: 1.03 MG/DL (ref 0.5–1.4)
EKG IMPRESSION: NORMAL
EST. AVERAGE GLUCOSE BLD GHB EST-MCNC: 171 MG/DL
GLUCOSE SERPL-MCNC: 119 MG/DL (ref 65–99)
HBA1C MFR BLD: 7.6 % (ref 4–5.6)
POTASSIUM SERPL-SCNC: 4.8 MMOL/L (ref 3.6–5.5)
SCCMEC + MECA PNL NOSE NAA+PROBE: NEGATIVE
SCCMEC + MECA PNL NOSE NAA+PROBE: NEGATIVE
SODIUM SERPL-SCNC: 137 MMOL/L (ref 135–145)

## 2022-02-11 PROCEDURE — 36415 COLL VENOUS BLD VENIPUNCTURE: CPT

## 2022-02-11 PROCEDURE — 87641 MR-STAPH DNA AMP PROBE: CPT

## 2022-02-11 PROCEDURE — 80048 BASIC METABOLIC PNL TOTAL CA: CPT

## 2022-02-11 PROCEDURE — 83036 HEMOGLOBIN GLYCOSYLATED A1C: CPT

## 2022-02-11 PROCEDURE — 93010 ELECTROCARDIOGRAM REPORT: CPT | Performed by: INTERNAL MEDICINE

## 2022-02-11 PROCEDURE — 93005 ELECTROCARDIOGRAM TRACING: CPT

## 2022-02-11 PROCEDURE — 87640 STAPH A DNA AMP PROBE: CPT

## 2022-02-14 NOTE — OR NURSING
"Patient reports to me that her mild symptoms of stuffy nose began 1/25/2022 and resolved within a \"few days\".  Patient okay to proceed with surgery 2/15/2022 per current policy.  +Covid result from 1/28/2022 scanned in to chart.   "

## 2022-02-15 ENCOUNTER — ANESTHESIA (OUTPATIENT)
Dept: SURGERY | Facility: MEDICAL CENTER | Age: 52
End: 2022-02-15
Payer: MEDICAID

## 2022-02-15 ENCOUNTER — ANESTHESIA EVENT (OUTPATIENT)
Dept: SURGERY | Facility: MEDICAL CENTER | Age: 52
End: 2022-02-15
Payer: MEDICAID

## 2022-02-15 ENCOUNTER — HOSPITAL ENCOUNTER (OUTPATIENT)
Facility: MEDICAL CENTER | Age: 52
End: 2022-02-15
Attending: ORTHOPAEDIC SURGERY | Admitting: ORTHOPAEDIC SURGERY
Payer: MEDICAID

## 2022-02-15 VITALS
DIASTOLIC BLOOD PRESSURE: 69 MMHG | WEIGHT: 158.95 LBS | TEMPERATURE: 98.3 F | HEIGHT: 59 IN | HEART RATE: 66 BPM | BODY MASS INDEX: 32.04 KG/M2 | OXYGEN SATURATION: 95 % | RESPIRATION RATE: 16 BRPM | SYSTOLIC BLOOD PRESSURE: 125 MMHG

## 2022-02-15 DIAGNOSIS — G89.18 POST-OPERATIVE PAIN: ICD-10-CM

## 2022-02-15 DIAGNOSIS — G56.02 LEFT CARPAL TUNNEL SYNDROME: ICD-10-CM

## 2022-02-15 LAB
GLUCOSE BLD-MCNC: 131 MG/DL (ref 65–99)
GLUCOSE BLD-MCNC: 146 MG/DL (ref 65–99)

## 2022-02-15 PROCEDURE — 64721 CARPAL TUNNEL SURGERY: CPT | Mod: LT | Performed by: ORTHOPAEDIC SURGERY

## 2022-02-15 PROCEDURE — 700102 HCHG RX REV CODE 250 W/ 637 OVERRIDE(OP): Performed by: EMERGENCY MEDICINE

## 2022-02-15 PROCEDURE — 160002 HCHG RECOVERY MINUTES (STAT): Performed by: ORTHOPAEDIC SURGERY

## 2022-02-15 PROCEDURE — 160009 HCHG ANES TIME/MIN: Performed by: ORTHOPAEDIC SURGERY

## 2022-02-15 PROCEDURE — A9270 NON-COVERED ITEM OR SERVICE: HCPCS | Performed by: EMERGENCY MEDICINE

## 2022-02-15 PROCEDURE — 700101 HCHG RX REV CODE 250: Performed by: EMERGENCY MEDICINE

## 2022-02-15 PROCEDURE — 700111 HCHG RX REV CODE 636 W/ 250 OVERRIDE (IP): Performed by: EMERGENCY MEDICINE

## 2022-02-15 PROCEDURE — 160046 HCHG PACU - 1ST 60 MINS PHASE II: Performed by: ORTHOPAEDIC SURGERY

## 2022-02-15 PROCEDURE — 700111 HCHG RX REV CODE 636 W/ 250 OVERRIDE (IP): Performed by: ORTHOPAEDIC SURGERY

## 2022-02-15 PROCEDURE — 82962 GLUCOSE BLOOD TEST: CPT

## 2022-02-15 PROCEDURE — 160028 HCHG SURGERY MINUTES - 1ST 30 MINS LEVEL 3: Performed by: ORTHOPAEDIC SURGERY

## 2022-02-15 PROCEDURE — 25115 REMOVE WRIST/FOREARM LESION: CPT | Mod: LT | Performed by: ORTHOPAEDIC SURGERY

## 2022-02-15 PROCEDURE — 700105 HCHG RX REV CODE 258: Performed by: ORTHOPAEDIC SURGERY

## 2022-02-15 PROCEDURE — 501838 HCHG SUTURE GENERAL: Performed by: ORTHOPAEDIC SURGERY

## 2022-02-15 PROCEDURE — 160025 RECOVERY II MINUTES (STATS): Performed by: ORTHOPAEDIC SURGERY

## 2022-02-15 PROCEDURE — 160048 HCHG OR STATISTICAL LEVEL 1-5: Performed by: ORTHOPAEDIC SURGERY

## 2022-02-15 PROCEDURE — C1763 CONN TISS, NON-HUMAN: HCPCS | Performed by: ORTHOPAEDIC SURGERY

## 2022-02-15 PROCEDURE — 700101 HCHG RX REV CODE 250: Performed by: ORTHOPAEDIC SURGERY

## 2022-02-15 PROCEDURE — 160035 HCHG PACU - 1ST 60 MINS PHASE I: Performed by: ORTHOPAEDIC SURGERY

## 2022-02-15 PROCEDURE — 160039 HCHG SURGERY MINUTES - EA ADDL 1 MIN LEVEL 3: Performed by: ORTHOPAEDIC SURGERY

## 2022-02-15 PROCEDURE — 500881 HCHG PACK, EXTREMITY: Performed by: ORTHOPAEDIC SURGERY

## 2022-02-15 DEVICE — GUIDE NERVE 7.0MMX40MM: Type: IMPLANTABLE DEVICE | Site: WRIST | Status: FUNCTIONAL

## 2022-02-15 RX ORDER — MIDAZOLAM HYDROCHLORIDE 1 MG/ML
INJECTION INTRAMUSCULAR; INTRAVENOUS PRN
Status: DISCONTINUED | OUTPATIENT
Start: 2022-02-15 | End: 2022-02-15 | Stop reason: SURG

## 2022-02-15 RX ORDER — HALOPERIDOL 5 MG/ML
1 INJECTION INTRAMUSCULAR
Status: DISCONTINUED | OUTPATIENT
Start: 2022-02-15 | End: 2022-02-15 | Stop reason: HOSPADM

## 2022-02-15 RX ORDER — OXYCODONE HYDROCHLORIDE AND ACETAMINOPHEN 5; 325 MG/1; MG/1
1 TABLET ORAL
Status: COMPLETED | OUTPATIENT
Start: 2022-02-15 | End: 2022-02-15

## 2022-02-15 RX ORDER — LABETALOL HYDROCHLORIDE 5 MG/ML
5 INJECTION, SOLUTION INTRAVENOUS
Status: DISCONTINUED | OUTPATIENT
Start: 2022-02-15 | End: 2022-02-15 | Stop reason: HOSPADM

## 2022-02-15 RX ORDER — MEPERIDINE HYDROCHLORIDE 25 MG/ML
6.25 INJECTION INTRAMUSCULAR; INTRAVENOUS; SUBCUTANEOUS
Status: DISCONTINUED | OUTPATIENT
Start: 2022-02-15 | End: 2022-02-15 | Stop reason: HOSPADM

## 2022-02-15 RX ORDER — HYDRALAZINE HYDROCHLORIDE 20 MG/ML
5 INJECTION INTRAMUSCULAR; INTRAVENOUS
Status: DISCONTINUED | OUTPATIENT
Start: 2022-02-15 | End: 2022-02-15 | Stop reason: HOSPADM

## 2022-02-15 RX ORDER — BUPIVACAINE HYDROCHLORIDE 5 MG/ML
INJECTION, SOLUTION EPIDURAL; INTRACAUDAL
Status: DISCONTINUED | OUTPATIENT
Start: 2022-02-15 | End: 2022-02-15 | Stop reason: HOSPADM

## 2022-02-15 RX ORDER — CEFAZOLIN SODIUM 1 G/3ML
2 INJECTION, POWDER, FOR SOLUTION INTRAMUSCULAR; INTRAVENOUS ONCE
Status: DISCONTINUED | OUTPATIENT
Start: 2022-02-15 | End: 2022-02-15 | Stop reason: HOSPADM

## 2022-02-15 RX ORDER — LIDOCAINE HYDROCHLORIDE 20 MG/ML
INJECTION, SOLUTION EPIDURAL; INFILTRATION; INTRACAUDAL; PERINEURAL PRN
Status: DISCONTINUED | OUTPATIENT
Start: 2022-02-15 | End: 2022-02-15 | Stop reason: SURG

## 2022-02-15 RX ORDER — DEXTROSE MONOHYDRATE 25 G/50ML
50 INJECTION, SOLUTION INTRAVENOUS
Status: DISCONTINUED | OUTPATIENT
Start: 2022-02-15 | End: 2022-02-15 | Stop reason: HOSPADM

## 2022-02-15 RX ORDER — DEXAMETHASONE SODIUM PHOSPHATE 4 MG/ML
INJECTION, SOLUTION INTRA-ARTICULAR; INTRALESIONAL; INTRAMUSCULAR; INTRAVENOUS; SOFT TISSUE PRN
Status: DISCONTINUED | OUTPATIENT
Start: 2022-02-15 | End: 2022-02-15 | Stop reason: SURG

## 2022-02-15 RX ORDER — HYDROMORPHONE HYDROCHLORIDE 1 MG/ML
0.2 INJECTION, SOLUTION INTRAMUSCULAR; INTRAVENOUS; SUBCUTANEOUS
Status: DISCONTINUED | OUTPATIENT
Start: 2022-02-15 | End: 2022-02-15 | Stop reason: HOSPADM

## 2022-02-15 RX ORDER — SODIUM CHLORIDE, SODIUM LACTATE, POTASSIUM CHLORIDE, CALCIUM CHLORIDE 600; 310; 30; 20 MG/100ML; MG/100ML; MG/100ML; MG/100ML
INJECTION, SOLUTION INTRAVENOUS CONTINUOUS
Status: DISCONTINUED | OUTPATIENT
Start: 2022-02-15 | End: 2022-02-15 | Stop reason: HOSPADM

## 2022-02-15 RX ORDER — SODIUM CHLORIDE, SODIUM LACTATE, POTASSIUM CHLORIDE, AND CALCIUM CHLORIDE .6; .31; .03; .02 G/100ML; G/100ML; G/100ML; G/100ML
500 INJECTION, SOLUTION INTRAVENOUS ONCE
Status: DISCONTINUED | OUTPATIENT
Start: 2022-02-15 | End: 2022-02-15 | Stop reason: HOSPADM

## 2022-02-15 RX ORDER — BUPIVACAINE HYDROCHLORIDE 5 MG/ML
INJECTION, SOLUTION EPIDURAL; INTRACAUDAL
Status: DISCONTINUED
Start: 2022-02-15 | End: 2022-02-15 | Stop reason: HOSPADM

## 2022-02-15 RX ORDER — LIDOCAINE HYDROCHLORIDE 10 MG/ML
INJECTION, SOLUTION EPIDURAL; INFILTRATION; INTRACAUDAL; PERINEURAL
Status: DISCONTINUED
Start: 2022-02-15 | End: 2022-02-15 | Stop reason: HOSPADM

## 2022-02-15 RX ORDER — CEFAZOLIN SODIUM 1 G/3ML
2 INJECTION, POWDER, FOR SOLUTION INTRAMUSCULAR; INTRAVENOUS ONCE
Status: COMPLETED | OUTPATIENT
Start: 2022-02-15 | End: 2022-02-15

## 2022-02-15 RX ORDER — OXYCODONE HYDROCHLORIDE AND ACETAMINOPHEN 5; 325 MG/1; MG/1
2 TABLET ORAL
Status: COMPLETED | OUTPATIENT
Start: 2022-02-15 | End: 2022-02-15

## 2022-02-15 RX ORDER — DIPHENHYDRAMINE HYDROCHLORIDE 50 MG/ML
12.5 INJECTION INTRAMUSCULAR; INTRAVENOUS
Status: DISCONTINUED | OUTPATIENT
Start: 2022-02-15 | End: 2022-02-15 | Stop reason: HOSPADM

## 2022-02-15 RX ORDER — TRAMADOL HYDROCHLORIDE 50 MG/1
50 TABLET ORAL EVERY 6 HOURS PRN
Qty: 15 TABLET | Refills: 0 | Status: SHIPPED | OUTPATIENT
Start: 2022-02-15 | End: 2022-02-20

## 2022-02-15 RX ORDER — PHENYLEPHRINE HCL IN 0.9% NACL 0.5 MG/5ML
SYRINGE (ML) INTRAVENOUS PRN
Status: DISCONTINUED | OUTPATIENT
Start: 2022-02-15 | End: 2022-02-15 | Stop reason: SURG

## 2022-02-15 RX ORDER — HYDROMORPHONE HYDROCHLORIDE 1 MG/ML
0.1 INJECTION, SOLUTION INTRAMUSCULAR; INTRAVENOUS; SUBCUTANEOUS
Status: DISCONTINUED | OUTPATIENT
Start: 2022-02-15 | End: 2022-02-15 | Stop reason: HOSPADM

## 2022-02-15 RX ORDER — LIDOCAINE HYDROCHLORIDE 10 MG/ML
INJECTION, SOLUTION INFILTRATION; PERINEURAL
Status: DISCONTINUED | OUTPATIENT
Start: 2022-02-15 | End: 2022-02-15 | Stop reason: HOSPADM

## 2022-02-15 RX ORDER — ONDANSETRON 2 MG/ML
4 INJECTION INTRAMUSCULAR; INTRAVENOUS
Status: DISCONTINUED | OUTPATIENT
Start: 2022-02-15 | End: 2022-02-15 | Stop reason: HOSPADM

## 2022-02-15 RX ORDER — HYDROMORPHONE HYDROCHLORIDE 1 MG/ML
0.4 INJECTION, SOLUTION INTRAMUSCULAR; INTRAVENOUS; SUBCUTANEOUS
Status: DISCONTINUED | OUTPATIENT
Start: 2022-02-15 | End: 2022-02-15 | Stop reason: HOSPADM

## 2022-02-15 RX ORDER — ONDANSETRON 2 MG/ML
INJECTION INTRAMUSCULAR; INTRAVENOUS PRN
Status: DISCONTINUED | OUTPATIENT
Start: 2022-02-15 | End: 2022-02-15 | Stop reason: SURG

## 2022-02-15 RX ADMIN — PROPOFOL 140 MG: 10 INJECTION, EMULSION INTRAVENOUS at 08:08

## 2022-02-15 RX ADMIN — LIDOCAINE HYDROCHLORIDE 0.5 ML: 10 INJECTION, SOLUTION INFILTRATION; PERINEURAL at 07:06

## 2022-02-15 RX ADMIN — OXYCODONE HYDROCHLORIDE AND ACETAMINOPHEN 2 TABLET: 5; 325 TABLET ORAL at 09:09

## 2022-02-15 RX ADMIN — MIDAZOLAM HYDROCHLORIDE 2 MG: 1 INJECTION, SOLUTION INTRAMUSCULAR; INTRAVENOUS at 08:05

## 2022-02-15 RX ADMIN — LIDOCAINE HYDROCHLORIDE 100 MG: 20 INJECTION, SOLUTION EPIDURAL; INFILTRATION; INTRACAUDAL at 08:08

## 2022-02-15 RX ADMIN — CEFAZOLIN 2 G: 330 INJECTION, POWDER, FOR SOLUTION INTRAMUSCULAR; INTRAVENOUS at 08:14

## 2022-02-15 RX ADMIN — DEXAMETHASONE SODIUM PHOSPHATE 4 MG: 4 INJECTION, SOLUTION INTRA-ARTICULAR; INTRALESIONAL; INTRAMUSCULAR; INTRAVENOUS; SOFT TISSUE at 08:14

## 2022-02-15 RX ADMIN — SODIUM CHLORIDE, POTASSIUM CHLORIDE, SODIUM LACTATE AND CALCIUM CHLORIDE: 600; 310; 30; 20 INJECTION, SOLUTION INTRAVENOUS at 07:06

## 2022-02-15 RX ADMIN — FENTANYL CITRATE 50 MCG: 50 INJECTION, SOLUTION INTRAMUSCULAR; INTRAVENOUS at 08:08

## 2022-02-15 RX ADMIN — Medication 200 MCG: at 08:30

## 2022-02-15 RX ADMIN — ONDANSETRON 4 MG: 2 INJECTION INTRAMUSCULAR; INTRAVENOUS at 08:14

## 2022-02-15 ASSESSMENT — PAIN DESCRIPTION - PAIN TYPE
TYPE: SURGICAL PAIN
TYPE: SURGICAL PAIN

## 2022-02-15 ASSESSMENT — PAIN SCALES - GENERAL: PAIN_LEVEL: 2

## 2022-02-15 NOTE — ANESTHESIA PROCEDURE NOTES
Airway    Date/Time: 2/15/2022 8:09 AM  Performed by: Jeyson Garcia M.D.  Authorized by: Jeyson Garcia M.D.     Location:  OR  Urgency:  Elective  Indications for Airway Management:  Anesthesia      Spontaneous Ventilation: absent    Sedation Level:  Deep  Preoxygenated: Yes    Mask Difficulty Assessment:  0 - not attempted  Final Airway Type:  Supraglottic airway  Final Supraglottic Airway:  Standard LMA    SGA Size:  4  Number of Attempts at Approach:  1

## 2022-02-15 NOTE — DISCHARGE INSTRUCTIONS
Keep dressing clean and dry   Elevate extremity   Keep dressing on until next appointment   Encourage moving all fingers    ACTIVITY: Rest and take it easy for the first 24 hours.  A responsible adult is recommended to remain with you during that time.  It is normal to feel sleepy.  We encourage you to not do anything that requires balance, judgment or coordination.    MILD FLU-LIKE SYMPTOMS ARE NORMAL. YOU MAY EXPERIENCE GENERALIZED MUSCLE ACHES, THROAT IRRITATION, HEADACHE AND/OR SOME NAUSEA.    FOR 24 HOURS DO NOT:  Drive, operate machinery or run household appliances.  Drink beer or alcoholic beverages.   Make important decisions or sign legal documents.    DIET: To avoid nausea, slowly advance diet as tolerated, avoiding spicy or greasy foods for the first day.  Add more substantial food to your diet according to your physician's instructions.  Babies can be fed formula or breast milk as soon as they are hungry.  INCREASE FLUIDS AND FIBER TO AVOID CONSTIPATION.    FOLLOW-UP APPOINTMENT:  A follow-up appointment should be arranged with Dr. Corrigan [phone]; call to schedule.    You should CALL YOUR PHYSICIAN if you develop:  Fever greater than 101 degrees F.  Pain not relieved by medication, or persistent nausea or vomiting.  Excessive bleeding (blood soaking through dressing) or unexpected drainage from the wound.  Extreme redness or swelling around the incision site, drainage of pus or foul smelling drainage.  Inability to urinate or empty your bladder within 8 hours.  Problems with breathing or chest pain.    You should call [phone] if you develop problems with breathing or chest pain.  If you are unable to contact your doctor or surgical center, you should go to the nearest emergency room or urgent care center.    Physician's telephone #: Dr. Corrigan [phone]    If any questions arise, call your doctor.  If your doctor is not available, please feel free to call the Surgical Center at [phone].      A registered nurse may call you a few days after your surgery to see how you are doing after your procedure.    MEDICATIONS: Resume taking daily medication.  Take prescribed pain medication with food.  If no medication is prescribed, you may take non-aspirin pain medication if needed.  PAIN MEDICATION CAN BE VERY CONSTIPATING.  Take a stool softener or laxative such as senokot, pericolace, or milk of magnesia if needed.    Prescription given for Tramadol.  Last pain medication given at 2 tabs percocet at 0909.    If your physician has prescribed pain medication that includes Acetaminophen (Tylenol), do not take additional Acetaminophen (Tylenol) while taking the prescribed medication.    Depression / Suicide Risk    As you are discharged from this [facility] facility, it is important to learn how to keep safe from harming yourself.    Recognize the warning signs:  · Abrupt changes in personality, positive or negative- including increase in energy   · Giving away possessions  · Change in eating patterns- significant weight changes-  positive or negative  · Change in sleeping patterns- unable to sleep or sleeping all the time   · Unwillingness or inability to communicate  · Depression  · Unusual sadness, discouragement and loneliness  · Talk of wanting to die  · Neglect of personal appearance   · Rebelliousness- reckless behavior  · Withdrawal from people/activities they love  · Confusion- inability to concentrate     If you or a loved one observes any of these behaviors or has concerns about self-harm, here's what you can do:  · Talk about it- your feelings and reasons for harming yourself  · Remove any means that you might use to hurt yourself (examples: pills, rope, extension cords, firearm)  · Get professional help from the community (Mental Health, Substance Abuse, psychological counseling)  · Do not be alone:Call your Safe Contact- someone whom you trust who will be there for you.  · Call your local  CRISIS HOTLINE 090-7380 or 330-337-3828  · Call your local Children's Mobile Crisis Response Team [place] (265) 333-6952 or www.[employer]  · Call the toll free National Suicide Prevention Hotlines   · National Suicide Prevention Lifeline 707-975-JLDY (7049)  · National [employer] Line Network 800-SUICIDE (770-8618)

## 2022-02-15 NOTE — H&P
Surgery Orthopedic History & Physical Note    Date  2/15/2022    Primary Care Physician  Faustina Painting M.D.      Pre-Op Diagnosis Codes:     * Left carpal tunnel syndrome [G56.02]    HPI  This is a 52 y.o. female who presented with Subjective   Patient ID:  Meggan Espinoza is a 52 y.o. female.    Chief Complaint:  Pain of the Right Hand and Pain of the Left Hand      HPI  Pain Assessment  Pain Assessment: 0-10  Pain Score: 0 - No pain                        Patient seen in clinic today for evaluation of bilateral carpal tunnel syndrome.  Her last visit we gave her some diagnostic/therapeutic injections.  She currently says the left side which she had the previous carpal tunnel release on is worse.  She in talking to her she says that the numbness did recur and was initially gone.  She does feel like the shot gave her may be 40% relief for about 3 weeks.  She still had numbness but her pain was improved.  She got less relief actually on her opposite right wrist.    Physical Exam  Today clinically she has numbness in both hands worse on the left than on the right.  She cannot delineate two-point.  Compression Phalen's and Tinel's did seem to increase some of her symptoms today in clinic.  She has some localized tenderness.    Imaging  MR-BRAIN-W/O    Result Date: 1/19/2022 1/19/2022 10:25 AM HISTORY/REASON FOR EXAM: Ataxia, nontraumatic, stroke excluded; Ataxia TECHNIQUE/EXAM DESCRIPTION: T1 sagittal, T2 axial, flair coronal, T1 coronal, and diffusion-weighted axial images were obtained of the brain. COMPARISON: None. FINDINGS: Slightly prominent subarachnoid space in the superior vermin cistern which may represent an arachnoid cyst. A cleft-like defect is identified in the inferior right temporal lobe with a minimal questionable surrounding gliosis. This appears to communicate with the temporal horns right lateral ventricle which is slightly dilated in this location. The cleft itself does not appear to be  lined by cerebral cortex suggesting that this is probably not a form of open lip schizencephaly. No other focal brain lesions are identified. There is no evidence of intracranial mass or mass effect. No evidence of midline shift. There is no evidence of focal cerebral edema. There are no extra axial collections. Visualized intracranial arterial flow voids are within normal limits. Bone marrow signal in the calvarium is within normal limits. Mucous retention cysts are noted in the bilateral maxillary sinuses. Included portions of the mastoid air cells are within normal limits. Included portions of the orbits are within normal limits     No acute intracranial process. 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 This most likely represents gliosis from previous insult. The cleft does not appear to be lined by cortex, however if there is concern for schizencephaly (which can cause seizures) consider 3-D FSPGR/SPACE T1 imaging to assess whether there is a gray matter lining  to the margins of this cleft.    Other studies  Previous EMG showed moderate carpal tunnel syndrome    Encounter Diagnoses:   #1 recurrent left carpal tunnel syndrome #2 right carpal tunnel syndrome    Plan  I had a long talk with her today about treatment options.  I explained the risk-benefit complication alternatives to surgery.  I told her the numbness could be there up to a year some of it could be permanent.  She understands this.  She did get approximately 40% relief with the shot.  Some of this also could be cervical in nature.  At this point time we have elected to go ahead and do a revision open carpal tunnel release.  Possible nerve wrap.  This will be scheduled in the near future.  We will see her back in clinic postoperatively.    Orders Placed  This Encounter   • Surgical Case Request: RELEASE, CARPAL TUNNEL   • S. Aureus By PCR, Nasal Complete (REQUIRED FOR TOTAL JOINTS)     No follow-ups on file.          Past Medical History:   Diagnosis Date   • Arthritis     back   • Bowel habit changes     IBS for years   • Diabetes    • Dyslipidemia    • GERD (gastroesophageal reflux disease)    • Heart burn    • High cholesterol    • HTN (hypertension)    • Hypertension    • Lower back pain     chronic   • Vitamin deficiency        Past Surgical History:   Procedure Laterality Date   • PB SHLDR ARTHROSCOP,PART ACROMIOPLAS Right 9/22/2020    Procedure: DECOMPRESSION, SHOULDER, ARTHROSCOPIC - SUBACROMIAL, LABRAL DEBRIDEMENT;  Surgeon: Toribio Coronado M.D.;  Location: SURGERY [facility];  Service: Orthopedics   • PB ARTHROSCOPY SHOULDER SURGICAL BICEPS TENODES* Right 9/22/2020    Procedure: ARTHROSCOPY, SHOULDER, WITH BICEPS TENODESIS;  Surgeon: Toribio Coronado M.D.;  Location: SURGERY [facility];  Service: Orthopedics   • OTHER ORTHOPEDIC SURGERY  2017    left carpal tunnel release   • CHOLECYSTECTOMY     • TONSILLECTOMY     • TUBAL COAGULATION LAPAROSCOPIC BILATERAL         Current Facility-Administered Medications   Medication Dose Route Frequency Provider Last Rate Last Admin   • ceFAZolin (ANCEF) injection 2 g  2 g Intravenous Once Mariusz Corrigan M.D.       • vancomycin 1000 mg in 250 mL NS IVPB Premix  1,000 mg Intravenous Once PRN Mariusz Corrigan M.D.       • lidocaine (XYLOCAINE) 1 % injection 0.5 mL  0.5 mL Intradermal Once PRN Mariusz Corrigan M.D.   0.5 mL at 02/15/22 0706   • lactated ringers infusion   Intravenous Continuous Mariusz Corrigan M.D. 10 mL/hr at 02/15/22 0706 New Bag at 02/15/22 0706   • BUPIVACAINE HCL (PF) 0.5 % INJ SOLN                Social History     Socioeconomic History   • Marital status:      Spouse name: Not on file   • Number of children: Not on file   • Years of education: Not on file   • Highest education  level: Not on file   Occupational History   • Not on file   Tobacco Use   • Smoking status: Never Smoker   • Smokeless tobacco: Never Used   Vaping Use   • Vaping Use: Never used   Substance and Sexual Activity   • Alcohol use: No   • Drug use: No   • Sexual activity: Not on file   Other Topics Concern   • Not on file   Social History Narrative   • Not on file     Social Determinants of Health     Financial Resource Strain:    • Difficulty of Paying Living Expenses: Not on file   Food Insecurity:    • Worried About Running Out of Food in the Last Year: Not on file   • Ran Out of Food in the Last Year: Not on file   Transportation Needs:    • Lack of Transportation (Medical): Not on file   • Lack of Transportation (Non-Medical): Not on file   Physical Activity:    • Days of Exercise per Week: Not on file   • Minutes of Exercise per Session: Not on file   Stress:    • Feeling of Stress : Not on file   Social Connections:    • Frequency of Communication with Friends and Family: Not on file   • Frequency of Social Gatherings with Friends and Family: Not on file   • Attends Sikhism Services: Not on file   • Active Member of Clubs or Organizations: Not on file   • Attends Club or Organization Meetings: Not on file   • Marital Status: Not on file   Intimate Partner Violence:    • Fear of Current or Ex-Partner: Not on file   • Emotionally Abused: Not on file   • Physically Abused: Not on file   • Sexually Abused: Not on file   Housing Stability:    • Unable to Pay for Housing in the Last Year: Not on file   • Number of Places Lived in the Last Year: Not on file   • Unstable Housing in the Last Year: Not on file       Family History   Problem Relation Age of Onset   • Hypertension Mother    • Diabetes Mother        Allergies  Nkda [no known drug allergy]    Review of Systems  Negative    Physical Exam    Vital Signs                          Labs:                    Radiology:  No orders to display          Assessment/Plan:  Pre-Op Diagnosis Codes:     * Left carpal tunnel syndrome [G56.02]  Procedure(s):  LEFT OPEN CARPAL TUNNEL RELEASE

## 2022-02-15 NOTE — ANESTHESIA PREPROCEDURE EVALUATION
Case: [id number] Date/Time: 02/15/22 0745    Procedure: LEFT OPEN CARPAL TUNNEL RELEASE (Left )    Diagnosis: Left carpal tunnel syndrome [G56.02]    Pre-op diagnosis: Left carpal tunnel syndrome [G56.02]    Location: [facility] ROOM 21 / SURGERY SAME DAY [facility]    Surgeons: Mariusz Corrigan M.D.          Relevant Problems   NEURO   (positive) Migraine with aura, intractable, without status migrainosus      CARDIAC   (positive) HTN (hypertension)   (positive) Migraine with aura, intractable, without status migrainosus      GI   (positive) GERD (gastroesophageal reflux disease)       Physical Exam    Airway   Mallampati: II  TM distance: >3 FB  Neck ROM: full       Cardiovascular - normal exam  Rhythm: regular  Rate: normal  (-) murmur     Dental - normal exam           Pulmonary - normal exam  Breath sounds clear to auscultation     Abdominal   (+) obese     Neurological - normal exam                 Anesthesia Plan    ASA 3       Plan - general       Airway plan will be LMA          Induction: intravenous    Postoperative Plan: Postoperative administration of opioids is intended.    Pertinent diagnostic labs and testing reviewed    Informed Consent:    Anesthetic plan and risks discussed with patient.    Use of blood products discussed with: patient whom consented to blood products.

## 2022-02-15 NOTE — ANESTHESIA POSTPROCEDURE EVALUATION
Patient: Meggan Espinoza    Procedure Summary     Date: 02/15/22 Room / Location: [facility] ROOM 21 / SURGERY SAME DAY [facility]    Anesthesia Start: 0802 Anesthesia Stop: 0848    Procedure: LEFT OPEN CARPAL TUNNEL RELEASE (Left Wrist) Diagnosis:       Left carpal tunnel syndrome      (Left carpal tunnel syndrome)    Surgeons: Mariusz Corrigan M.D. Responsible Provider: Jeyson Garcia M.D.    Anesthesia Type: general ASA Status: 2          Final Anesthesia Type: general  Last vitals  BP   Blood Pressure: (P) 109/67    Temp   (P) 36.8 °C (98.2 °F)    Pulse   (P) 83   Resp   (P) 20    SpO2   (P) 94 %      Anesthesia Post Evaluation    Patient location during evaluation: PACU  Patient participation: complete - patient participated  Level of consciousness: awake and alert  Pain score: 2    Airway patency: patent  Anesthetic complications: no  Cardiovascular status: hemodynamically stable  Respiratory status: acceptable  Hydration status: euvolemic    PONV: none          No complications documented.     Nurse Pain Score: 5 (NPRS)

## 2022-02-15 NOTE — OR NURSING
Patient recovering well post op. AAOx4, calm, with c/o 7/10 left wrist pain. Oral pain medication administered. Patient states pain is improving down to 4-5/10 and tolerable. Left fingers pink, warm, brisk cap refill, slightly numb. VSS, afebrile, room air breathing even and unlabored 96%. Tolerating sips of water and juice, no nausea.  updated on status, discharge instructions reviewed, both verbalize understanding.     0950-Patient met discharge criteria, discharged in wheelchair with all belongings.

## 2022-02-15 NOTE — ANESTHESIA TIME REPORT
Anesthesia Start and Stop Event Times     Date Time Event    2/15/2022 0800 Ready for Procedure     0802 Anesthesia Start     0848 Anesthesia Stop        Responsible Staff  02/15/22    Name Role Begin End    Jeyson Garcia M.D. Anesth 0802 0848        Preop Diagnosis (Free Text):  Pre-op Diagnosis     Left carpal tunnel syndrome        Preop Diagnosis (Codes):    Premium Reason  Non-Premium    Comments:

## 2022-02-15 NOTE — LETTER
January 25, 2022    Patient Name: Meggan Espinoza  Surgeon Name: Mariusz Corrigan M.D.  Surgery Facility: [facility] ([address] [facility])  Surgery Date: 2/15/2022    The time of your surgery is not final and may change up to and until the day of your surgery. You will be contacted 24-48 hours prior to your surgery date with your check-in and surgery time.    If you will not be at one of the below numbers please call/text the surgery scheduler at [phone]  Preferred Phone: [phone]    BEFORE YOUR SURGERY  Pre Registration and/or Lab Work must be done within and no earlier than 28 days prior to your surgery date. Please call [facility] at [phone] for an appointment as soon as possible.     Instructions: Bring a list of all medications you are taking including the dosing and frequency.    Please refrain from smoking any substance after midnight prior to surgery. Smoking may interfere with the anesthetic and frequently produces nausea during the recovery period.    Continue taking all lifesaving medications. Including the morning of your surgery with small sip of water.    Please read the MEDICATION INSTRUCTIONS below completely.    DAY OF YOUR SURGERY  Nothing to eat or drink after midnight     Please arrive at the hospital/surgery center at the check-in time provided.     An adult will need to bring you and take you home after your surgery.     AFTER YOUR SURGERY  Post op Appointment:   Date: 03/03/22   Time: 8AM   With: Mariusz Corrigan M.D.   Location: [address] [place] [address]            TIME OFF WORK  FMLA or Disability forms can be faxed directly to: [phone] or you may drop them off at [address] [place] [address]. Our office charges a $35.00 fee per form. Forms will be completed within 10 business days of drop off and payment received. For the status of your forms you may contact our disability office directly [phone].    MEDICATION INSTRUCTIONS  The following medications should be stopped a minimum of 10 days prior to surgery:  All over the counter, Supplements & Herbal medications    Anorectics: Phentermine (Adipex-P, Lomaira and Suprenza), Phentermine-topiramate (Qsymia), Bupropion-naltrexone (Contrave)    Opiod Partial Agonists/Opioid Antagonists: Buprenorphine (Subocone, Belbuca, Butrans, Probuphine Implant, Sublocade), Naltrexone (ReVia, Vivitrol), Naloxone    Amphetamines: Dextroamphetamine/Amphetamine (Adderall, Mydayis), Methylphenidate Hydrochloride (Concerta, Metadate, Methylin, Ritalin)    The following medications should be stopped 5 days prior to surgery:  Blood Thinners: Any Aspirin, Aspirin products, anti-inflammatories such as ibuprofen and any blood thinners such as Coumadin and Plavix. Please consult your prescribing physician if you are on life saving blood thinners, in regards to when to stop medications prior to surgery.     The following medications should be stopped a minimum of 3 days prior to surgery:  PDE-5 inhibitors: Sildenafil (Viagra), Tadalafil (Cialis), Vardenafil (Levitra), Avanafil (Stendra)    MAO Inhibitors: Rasagiline (Azilect), Selegiline (Eldepryl, Emsam, Selapar), Isocarboxazid (Marplan), Phenelzine (Nardil)

## 2022-02-15 NOTE — DISCHARGE INSTR - OTHER INFO
{[facility] additional discharge instructions:[id number]}                                                                                                            DR. GALLEGOS POST-OPERATIVE INSTRUCTIONS    You have just undergone a sugery by Dr. Gallegos in the operating room.  It is our wish that your postoperative recovery be as quick and comfortable as possible.  Please carefully review the following items that are important for your recovery.    After any operation, a certain degree of pain is to be expected. Take Advil (ibuprofen) and Extra Strength Tylenol as first line medications for mild to moderate pain. Taking each one every 6 hours, and staggering them so that you are taking one medicine every 3 hours, is the most effective. Refer to dosing instructions on the bottle, but in general ibuprofen dose is 600-800mg and Tylenol dose is 500-1000mg. For most small procedures, this should be enough to keep you comfortable. Take these medications until your followup visit. You may have been given a small prescription for stronger pain medicine which will help relieve more severe pain.  Pain medicine may make you drowsy so please keep this in mind.  Do not drive while taking pain medicine.      When you go home, please keep your operated arm elevated at all times (above the level of your heart).  If you do this, your swelling will resolve more quickly and your pain will be improve more quickly as well. You may also place an ice pack over your dressing or splint to help with swelling and pain.    It is also very important to keep your fingers moving after most procedures, unless you had a tendon repair or fracture repair in which case you will be in a splint. Keep all of your fingers moving through a full range of motion to prevent stiffness.    For small hand procedures such as carpal tunnel release, trigger finger release, and cyst excision, the dressing that you have on your extremity should remain on for 3-4 days. It may  then be removed, you can wash the incision gently with soap and water, and keep the incision covered with a band-aid or similar clean dressing. For larger procedures or if you have a splint on, these should remain on until follow up or as specifically instructed. If you feel that your dressing is too tight during the first 3 days after surgery, you may loosen it. It is normal to see minor staining on the dressing after surgery. If there is significant bleeding, you are advised to call the office during regular office hours to have this checked.  Make sure that your dressing is kept dry at all times.  You can take a shower if you cover your arm with a plastic bag. If your dressing gets wet, replace it with sterile dressing that you can obtain from your local drug store.    Follow-up appointments can usually be found on the pre-op paperwork if not please call our office for a follow-up appointment, [phone]. Follow up after surgery is typically 10-14 days, unless you were specifically instructed otherwise. The sutures will be removed and you may be asked to see a hand therapist to optimize your functional result. Each of the hand therapists that you will be referred to have received special training in the care of the hand and upper extremity.    If you have questions regarding your surgery postop that you feel requires attention, please call the office at [phone] during business hours, or [phone] after hours for the answering service. If you feel that you have a surgical emergency postoperatively that requires immediate attention, please call the above numbers or go to the Emergency Department and ask for the Orthopedic Surgeon on call.

## 2022-02-15 NOTE — OP REPORT
DATE OF SERVICE:  02/15/2022     PREOPERATIVE DIAGNOSIS:  Recurrent left carpal tunnel syndrome.     POSTOPERATIVE DIAGNOSIS:  Recurrent left carpal tunnel syndrome.     PROCEDURES:    1.  Revision left open carpal tunnel release.  2.  Debridement flexor tendons, left wrist.  3.  Application of nerve wrap.     SURGEON:  Mariusz Corrigan MD     ANESTHESIA:  General.     ESTIMATED BLOOD LOSS:  Minimal.     DRAINS:  None.     COMPLICATIONS:  None.     INDICATIONS:  The patient is a female who has carpal tunnel syndrome, has some   recurrence of symptoms, felt to benefit from the above-mentioned surgery.    Risks, benefits, complications and alternatives were explained to the patient.    All questions were answered.  No guarantees were given.  Signed consent was   obtained.     DESCRIPTION OF PROCEDURE:  The patient was taken to the operating room, laid   supine on the operating room table.  All bony prominences well padded.  The   left upper extremity was prepped and draped in the usual sterile fashion using   a ChloraPrep.  Limb was exsanguinated with elevation, tourniquet raised,   total tourniquet time was under a half hour.  I used the previous incision   over the carpal tunnel, extended it proximally through skin and subcutaneous   tissues. Identified the median nerve proximal to her previous incision outside   the zone of scarring and then subsequently traced the nerve working distally   underneath the transverse carpal ligament using a [place] and releasing the   nerve from an underlying scar tissue.  Once I freed the nerve along its   entirety, I mobilized and did a neurolysis from the surrounding soft tissues.    The nerve appeared to be nicely freed.  There was quite a bit of synovitis   and inflammation of the tendons.  I subsequently debrided back to a stable   base with regards to the tendons.  I then did try and protect the nerve using   the AxoGen nerve wrap.  I placed a 7 mm x 4 cm long nerve wrap around  the   median nerve to try and prevent further scarring.  This was sutured into place   with a running 6-0 nylon.  Wound was irrigated, closed with nylon in a   mattress fashion.  Sterile dressing, Xeroform, 4x4's, Sof-Rol and a resting   splint was applied.  All needle, sponge counts were correct.  The patient   tolerated the procedure well and was transferred to recovery room in stable   condition.        ______________________________  MD VÍCTOR NEGRETE/YAHAIRA    DD:  02/15/2022 09:01  DT:  02/15/2022 09:21    Job#:  [id number]

## 2022-03-18 ENCOUNTER — TELEPHONE (OUTPATIENT)
Dept: NEUROLOGY | Facility: MEDICAL CENTER | Age: 52
End: 2022-03-18
Payer: MEDICAID

## 2022-03-18 NOTE — TELEPHONE ENCOUNTER
Called Pt and advised [facility] told me they have sent a PA for the Emgality and emailed our specialists to find out where we are at with the PA. Talked about giving samples if we are far out for the Emgality PA.  Theodora Salgado MA.

## 2022-04-08 ENCOUNTER — TELEPHONE (OUTPATIENT)
Dept: NEUROLOGY | Facility: MEDICAL CENTER | Age: 52
End: 2022-04-08
Payer: MEDICAID

## 2022-04-08 ENCOUNTER — TELEPHONE (OUTPATIENT)
Dept: NEUROLOGY | Facility: MEDICAL CENTER | Age: 52
End: 2022-04-08

## 2022-04-08 NOTE — TELEPHONE ENCOUNTER
Called Pt and let her know it was an insurance issue/confusion between our office and the pharmacy, our team will push the PA's through the Pts new insurance for coverage.  SANDRA Yip.

## 2022-04-08 NOTE — TELEPHONE ENCOUNTER
Ubrelvy 100mg Tablet    PA called into [facility] [place] [employer] [id number] Awaiting response TAT 24-72 hrs. - 04/08/2022 3:23pm    Emgality 120mg/ml COURTNEY MEZA called into [facility] [place] [employer] [id number] Awaiting response TAT 24-72 hrs. - 04/08/2022 3:23pm

## 2022-04-13 ENCOUNTER — OFFICE VISIT (OUTPATIENT)
Dept: NEUROLOGY | Facility: MEDICAL CENTER | Age: 52
End: 2022-04-13
Attending: PSYCHIATRY & NEUROLOGY
Payer: MEDICAID

## 2022-04-13 VITALS
HEIGHT: 59 IN | HEART RATE: 86 BPM | WEIGHT: 173.06 LBS | BODY MASS INDEX: 34.89 KG/M2 | TEMPERATURE: 97.9 F | SYSTOLIC BLOOD PRESSURE: 114 MMHG | OXYGEN SATURATION: 97 % | DIASTOLIC BLOOD PRESSURE: 64 MMHG

## 2022-04-13 DIAGNOSIS — G43.119 MIGRAINE WITH AURA, INTRACTABLE, WITHOUT STATUS MIGRAINOSUS: ICD-10-CM

## 2022-04-13 DIAGNOSIS — R27.0 ATAXIA: ICD-10-CM

## 2022-04-13 PROCEDURE — 99213 OFFICE O/P EST LOW 20 MIN: CPT | Performed by: PSYCHIATRY & NEUROLOGY

## 2022-04-13 PROCEDURE — 99212 OFFICE O/P EST SF 10 MIN: CPT | Performed by: PSYCHIATRY & NEUROLOGY

## 2022-04-13 RX ORDER — ZOLMITRIPTAN 5 MG/1
TABLET, FILM COATED ORAL
Qty: 9 TABLET | Refills: 6 | Status: SHIPPED | OUTPATIENT
Start: 2022-04-13 | End: 2022-10-10

## 2022-04-13 RX ORDER — CALCIUM CITRATE/VITAMIN D3 200MG-6.25
TABLET ORAL
COMMUNITY
Start: 2022-03-03

## 2022-04-13 RX ORDER — INSULIN DETEMIR 100 [IU]/ML
26 INJECTION, SOLUTION SUBCUTANEOUS EVERY EVENING
COMMUNITY
Start: 2022-03-08

## 2022-04-13 RX ORDER — UBROGEPANT 100 MG/1
1 TABLET ORAL PRN
Qty: 6 TABLET | Refills: 0 | COMMUNITY
Start: 2022-04-13 | End: 2022-12-12 | Stop reason: SDUPTHER

## 2022-04-13 RX ORDER — GALCANEZUMAB 120 MG/ML
INJECTION, SOLUTION SUBCUTANEOUS
Qty: 1 ML | Refills: 3 | COMMUNITY
Start: 2022-04-13 | End: 2022-12-12 | Stop reason: SDUPTHER

## 2022-04-13 ASSESSMENT — ENCOUNTER SYMPTOMS
HEADACHES: 1
FALLS: 0

## 2022-04-13 NOTE — PROGRESS NOTES
"Subjective     Meggan Espinoza is a 52 y.o. female who presents for follow-up, with a history of migraine with aura, but who had been having some problems with gait difficulties and falls.    HPI    Migraine: The headaches are still problematic for her, unfortunately Medicaid has continued to refuse to cover Emgality as well Ubrelvy, both of which are quite effective in treating her headaches.  She remains on Trokendi  mg every evening, dealing with the cognitive slowing.  The headaches still tended to happen in the 4 or 5 days just before she injects her medication, improving over the next 2 days.  Otherwise she is for the most part headache free.  Ubrelvy 100 mg used as needed still provides consistent benefit.  She is no longer using Imitrex as its benefit was lost.    Ataxia: She has had no additional episodes where her legs would suddenly give way underneath her.  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  This communicated with the right temporal horn, where there was some compensatory dilatation ex vacuo.  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  There is no other significant parenchymal abnormality.    Medical, surgical and family histories are reviewed, there are no new drug allergies.  She is on Emgality 120 mg injections every 4 weeks, Ubrelvy 100 mg as needed, Neurontin 800 mg, 3 times daily, Prinivil, Lipitor, Colestid, Levemir, glargine insulin, Glucophage, Pepcid, trazodone and omeprazole.    Review of Systems   Musculoskeletal: Negative for falls.   Neurological: Positive for headaches.   All other systems reviewed and are negative.    Objective     /64 (BP Location: Right arm, Patient Position: Sitting, BP Cuff Size: Adult)   Pulse 86   Temp 36.6 °C (97.9 °F) (Temporal)   Ht 1.499 m (4' 11\")   Wt 78.5 kg (173 lb 1 oz)   LMP 07/02/2015   SpO2 97%   BMI " 34.95 kg/m²      Physical Exam    She appears in no acute distress.  Her vital signs are stable.  There is no malar rash or jaw claudication.  Her neck is supple.  Cardiac evaluation is unremarkable.     Neurological Exam    In quick and cursory fashion, with observation, mental status, cranial nerve, musculoskeletal and coordination valuations are intact.    Assessment & Plan     1. Migraine with aura, intractable, without status migrainosus  I will provide samples of her Emgality, allowing her for the next 3 months to continue with its use.  She will notify us that she comes to the end of the 3-month interval so additional samples can be provided.  Ubrelvy 100 mg tablets also were provided as samples, I have also written for Zomig 5 mg tablets to be used as needed.  She was told to try the Zomig initially, first table a in t pain onset, repeating 1 hour if needed.  She was reassured that Ubrelvy can be used concurrent with Zomig if the latter proves ineffective.  We will follow-up in 6 months, contact via the web in the interim to adjust medications and to provide additional samples when necessary.    - Ubrogepant (UBRELVY) 100 MG Tab; Take 1 Tablet by mouth as needed (1 tab at headache osnet, repeat in 2 hour prn).  Dispense: 6 Tablet; Refill: 0  - Galcanezumab-gnlm (EMGALITY) 120 MG/ML Solution Auto-injector; INJECT 1 PEN SUBCUTANEOUSLY AS INSTRUCTED EVERY 4 WEEKS  Dispense: 1 mL; Refill: 3  - zolmitriptan (ZOMIG) 5 MG tablet; 1 tab at headache onset; repeat in 1 hour prn.  Dispense: 9 Tablet; Refill: 6    2. Ataxia  The abnormality seen on imaging I think is incidental, I suspect it is congenital, but regardless, inconsequential as it comes to her headaches or the balance difficulties and collapsing episodes she has experienced.  For now we will simply observe.    Time: 20 minutes in total spent on patient care including precharting, record review, discussions with healthcare staff and documentation.  This  includes face-to-face time with the patient for exam, review, discussion, as well as counseling and coordinating care.

## 2022-05-04 DIAGNOSIS — G43.119 MIGRAINE WITH AURA, INTRACTABLE, WITHOUT STATUS MIGRAINOSUS: ICD-10-CM

## 2022-05-06 RX ORDER — TOPIRAMATE 200 MG/1
CAPSULE, EXTENDED RELEASE ORAL
Qty: 30 CAPSULE | Refills: 6 | Status: SHIPPED | OUTPATIENT
Start: 2022-05-06 | End: 2022-11-29

## 2022-05-06 NOTE — TELEPHONE ENCOUNTER
Received request via: Pharmacy    Was the patient seen in the last year in this department? Yes    LV: 4/13/22  FV: 12/12/22    Does the patient have an active prescription (recently filled or refills available) for medication(s) requested? Yes.

## 2022-10-06 ENCOUNTER — OFFICE VISIT (OUTPATIENT)
Dept: PHYSICAL MEDICINE AND REHAB | Facility: MEDICAL CENTER | Age: 52
End: 2022-10-06
Payer: MEDICAID

## 2022-10-06 VITALS
HEIGHT: 59 IN | OXYGEN SATURATION: 97 % | BODY MASS INDEX: 31.24 KG/M2 | HEART RATE: 85 BPM | WEIGHT: 154.98 LBS | DIASTOLIC BLOOD PRESSURE: 62 MMHG | SYSTOLIC BLOOD PRESSURE: 118 MMHG | TEMPERATURE: 96.9 F

## 2022-10-06 DIAGNOSIS — M47.816 LUMBAR SPONDYLOSIS: ICD-10-CM

## 2022-10-06 DIAGNOSIS — M54.50 CHRONIC BILATERAL LOW BACK PAIN WITHOUT SCIATICA: ICD-10-CM

## 2022-10-06 DIAGNOSIS — G89.29 CHRONIC NECK PAIN: ICD-10-CM

## 2022-10-06 DIAGNOSIS — M70.62 GREATER TROCHANTERIC BURSITIS OF BOTH HIPS: ICD-10-CM

## 2022-10-06 DIAGNOSIS — M53.3 SACROILIAC JOINT DYSFUNCTION OF BOTH SIDES: ICD-10-CM

## 2022-10-06 DIAGNOSIS — G89.29 CHRONIC BILATERAL LOW BACK PAIN WITHOUT SCIATICA: ICD-10-CM

## 2022-10-06 DIAGNOSIS — M79.10 MYALGIA: ICD-10-CM

## 2022-10-06 DIAGNOSIS — M51.36 LUMBAR DEGENERATIVE DISC DISEASE: ICD-10-CM

## 2022-10-06 DIAGNOSIS — M54.2 CHRONIC NECK PAIN: ICD-10-CM

## 2022-10-06 DIAGNOSIS — M70.61 GREATER TROCHANTERIC BURSITIS OF BOTH HIPS: ICD-10-CM

## 2022-10-06 DIAGNOSIS — M47.812 CERVICAL SPONDYLOSIS: ICD-10-CM

## 2022-10-06 PROCEDURE — 99214 OFFICE O/P EST MOD 30 MIN: CPT | Performed by: PHYSICAL MEDICINE & REHABILITATION

## 2022-10-06 RX ORDER — PANTOPRAZOLE SODIUM 40 MG/1
40 TABLET, DELAYED RELEASE ORAL DAILY
COMMUNITY

## 2022-10-06 ASSESSMENT — PAIN SCALES - GENERAL: PAINLEVEL: 7=MODERATE-SEVERE PAIN

## 2022-10-06 ASSESSMENT — PATIENT HEALTH QUESTIONNAIRE - PHQ9: CLINICAL INTERPRETATION OF PHQ2 SCORE: 0

## 2022-10-06 NOTE — H&P (VIEW-ONLY)
New patient note    Interventional spine and Pain  Physiatry (physical medicine and  Rehabilitation)     Date of service: See epic    Chief complaint:   Chief Complaint   Patient presents with    New Patient     Neck pain        Referring provider: Faustina Painting M.D.     HISTORY    HPI: Meggan Espinoza 52 y.o.  who presents today with Diagnoses of Sacroiliac joint dysfunction of both sides, Lumbar spondylosis, Lumbar degenerative disc disease with type 1 modic changes L5-S1, Chronic bilateral low back pain without sciatica, Cervical spondylosis, Chronic neck pain, Myalgia, and Greater trochanteric bursitis of both hips were pertinent to this visit.    HPI    Chronic for greater than 6 months mostly axial neck pain with some radiation toward the bilateral shoulders mostly left shoulder 6 out of 10 intensity constant worse with neck movements.  Aching quality.    Chronic for greater than 6 months bilateral low back pain around the lower lumbar spine as well as around the sacroiliac joints with most of the pain around the sacroiliac joint 7 out of 10 in intensity aching quality.  Associated leg pain the back pain is significantly worse than the leg pain.  Denies specific radiating pain down the legs.  Denies numbness and tingling.    She has done a provider driven home exercise program including the past 6 months.  The patient has been going to water-based physical therapy including the past 3 months.    Medications tried include NSAIDs, acetaminophen, gabapentin for pain and for muscle relaxer, Meloxicam, oxycodone, tramadol    Medical records review:  I reviewed the note from the referring provider Faustina Painting M.D.  including the note dated 9/14/2022.     I reviewed the notes from orthopedic hand surgeon Dr. Mariusz Corrigan including from 6/ 17/2022.  Left hand carpal tunnel release improving.      ROS:   Red Flags ROS:   Fever, Chills, Sweats: Denies  Involuntary Weight Loss: Denies  Bladder Incontinence:  Denies  Bowel Incontinence: denies  Saddle Anesthesia: Denies    All other systems reviewed and negative.       PMHx:   Past Medical History:   Diagnosis Date    Arthritis     back    Bowel habit changes     IBS for years    Diabetes     Dyslipidemia     GERD (gastroesophageal reflux disease)     Heart burn     High cholesterol     HTN (hypertension)     Hypertension     Lower back pain     chronic    Vitamin deficiency          Current Outpatient Medications on File Prior to Visit   Medication Sig Dispense Refill    pantoprazole (PROTONIX) 40 MG Tablet Delayed Response Take 40 mg by mouth every day.      TROKENDI  MG CAPSULE SR 24 HR TAKE 1 CAPSULE BY MOUTH EVERY EVENING 30 Capsule 6    TRUE METRIX BLOOD GLUCOSE TEST strip USE AS DIRECTED 3-4 TIMES A DAY      LEVEMIR FLEXTOUCH 100 UNIT/ML injection PEN       Ubrogepant (UBRELVY) 100 MG Tab Take 1 Tablet by mouth as needed (1 tab at headache osnet, repeat in 2 hour prn). 6 Tablet 0    Galcanezumab-gnlm (EMGALITY) 120 MG/ML Solution Auto-injector INJECT 1 PEN SUBCUTANEOUSLY AS INSTRUCTED EVERY 4 WEEKS 1 mL 3    colestipol (COLESTID) 1 GM Tab Take 1 g by mouth 2 times a day.      INSULIN GLARGINE 100 UNIT/ML injection PEN Inject 24 Units as instructed every evening.      famotidine (PEPCID) 20 MG Tab       [employer] Lancets Ultra-Thin 30G Misc AS DIRECTED FOUR TIMES DAILY CHECK SUGARS 30 DAYS      metformin (GLUCOPHAGE) 1000 MG tablet Take 1,000 mg by mouth.      traZODone (DESYREL) 50 MG Tab Take 50 mg by mouth every evening.      gabapentin (NEURONTIN) 800 MG tablet Take 1 Tab by mouth 3 times a day. 90 Tab 0    atorvastatin (LIPITOR) 20 MG Tab TAKE 1 TABLET BY MOUTH EVERY DAY 30 Tab 3    Blood Glucose Monitoring Suppl (TRUE METRIX AIR GLUCOSE METER) Device 1 Each by Does not apply route 2 Times a Day. 1 Device 0    Misc. Devices Misc DM lancets Dispense brand covered by patients insurance Testing frequency: Twice dailyDx DM w/o complication. E11.9 100 Each 3     Misc. Devices Misc 1. Glucometer #1   2. Test strips and lancets to test blood sugars qid     #120 120 Each 6    lisinopril (PRINIVIL) 20 MG Tab Take 1 Tab by mouth every day. 30 Tab 11    Misc. Devices MISC Needles for solostar lantus use as directed 120 Each 6    zolmitriptan (ZOMIG) 5 MG tablet 1 tab at headache onset; repeat in 1 hour prn. (Patient not taking: Reported on 10/6/2022) 9 Tablet 6    OMEPRAZOLE PO Take  by mouth. (Patient not taking: Reported on 10/6/2022)       No current facility-administered medications on file prior to visit.        PSHx:   Past Surgical History:   Procedure Laterality Date    UT NEUROPLASTY & OR TRANSPOS MEDIAN NRV CARPAL MICHELLE Left 2/15/2022    Procedure: LEFT OPEN CARPAL TUNNEL RELEASE;  Surgeon: Mariusz Corrigan M.D.;  Location: SURGERY SAME DAY [facility];  Service: Orthopedics    UT SHLDR ARTHROSCOP,PART ACROMIOPLAS Right 9/22/2020    Procedure: DECOMPRESSION, SHOULDER, ARTHROSCOPIC - SUBACROMIAL, LABRAL DEBRIDEMENT;  Surgeon: Toribio Coronado M.D.;  Location: SURGERY [facility];  Service: Orthopedics    PB ARTHROSCOPY SHOULDER SURGICAL BICEPS TENODES* Right 9/22/2020    Procedure: ARTHROSCOPY, SHOULDER, WITH BICEPS TENODESIS;  Surgeon: Toribio Coronado M.D.;  Location: SURGERY [facility];  Service: Orthopedics    OTHER ORTHOPEDIC SURGERY  2017    left carpal tunnel release    CHOLECYSTECTOMY      TONSILLECTOMY      TUBAL COAGULATION LAPAROSCOPIC BILATERAL         Family history   Family History   Problem Relation Age of Onset    Hypertension Mother     Diabetes Mother          Medications: reviewed on epic.   Outpatient Medications Marked as Taking for the 10/6/22 encounter (Office Visit) with Luis Manuel Coronado M.D.   Medication Sig Dispense Refill    pantoprazole (PROTONIX) 40 MG Tablet Delayed Response Take 40 mg by mouth every day.      TROKENDI  MG CAPSULE SR 24 HR TAKE 1 CAPSULE BY MOUTH EVERY EVENING 30 Capsule 6    TRUE METRIX BLOOD GLUCOSE TEST  strip USE AS DIRECTED 3-4 TIMES A DAY      LEVEMIR FLEXTOUCH 100 UNIT/ML injection PEN       Ubrogepant (UBRELVY) 100 MG Tab Take 1 Tablet by mouth as needed (1 tab at headache osnet, repeat in 2 hour prn). 6 Tablet 0    Galcanezumab-gnlm (EMGALITY) 120 MG/ML Solution Auto-injector INJECT 1 PEN SUBCUTANEOUSLY AS INSTRUCTED EVERY 4 WEEKS 1 mL 3    colestipol (COLESTID) 1 GM Tab Take 1 g by mouth 2 times a day.      INSULIN GLARGINE 100 UNIT/ML injection PEN Inject 24 Units as instructed every evening.      famotidine (PEPCID) 20 MG Tab       [employer] Lancets Ultra-Thin 30G Misc AS DIRECTED FOUR TIMES DAILY CHECK SUGARS 30 DAYS      metformin (GLUCOPHAGE) 1000 MG tablet Take 1,000 mg by mouth.      traZODone (DESYREL) 50 MG Tab Take 50 mg by mouth every evening.      gabapentin (NEURONTIN) 800 MG tablet Take 1 Tab by mouth 3 times a day. 90 Tab 0    atorvastatin (LIPITOR) 20 MG Tab TAKE 1 TABLET BY MOUTH EVERY DAY 30 Tab 3    Blood Glucose Monitoring Suppl (TRUE METRIX AIR GLUCOSE METER) Device 1 Each by Does not apply route 2 Times a Day. 1 Device 0    Misc. Devices Misc DM lancets Dispense brand covered by patients insurance Testing frequency: Twice dailyDx DM w/o complication. E11.9 100 Each 3    Misc. Devices Misc 1. Glucometer #1   2. Test strips and lancets to test blood sugars qid     #120 120 Each 6    lisinopril (PRINIVIL) 20 MG Tab Take 1 Tab by mouth every day. 30 Tab 11    Misc. Devices MISC Needles for solostar lantus use as directed 120 Each 6        Allergies:   Allergies   Allergen Reactions    Nkda [No Known Drug Allergy]        Social Hx:   Social History     Socioeconomic History    Marital status:      Spouse name: Not on file    Number of children: Not on file    Years of education: Not on file    Highest education level: Not on file   Occupational History    Not on file   Tobacco Use    Smoking status: Never    Smokeless tobacco: Never   Vaping Use    Vaping Use: Never used   Substance and  "Sexual Activity    Alcohol use: No    Drug use: No    Sexual activity: Not on file   Other Topics Concern    Not on file   Social History Narrative    Not on file     Social Determinants of Health     Financial Resource Strain: Not on file   Food Insecurity: Not on file   Transportation Needs: Not on file   Physical Activity: Not on file   Stress: Not on file   Social Connections: Not on file   Intimate Partner Violence: Not on file   Housing Stability: Not on file         EXAMINATION     Physical Exam:   Vitals: /62 (BP Location: Right arm, Patient Position: Sitting, BP Cuff Size: Adult)   Pulse 85   Temp 36.1 °C (96.9 °F) (Temporal)   Ht 1.499 m (4' 11\")   Wt 70.3 kg (154 lb 15.7 oz)   SpO2 97%     Constitutional:   Body Habitus: Body mass index is 31.3 kg/m².  Cooperation: Fully cooperates with exam  Appearance: Well-groomed, well-nourished, not disheveled     Eyes: No scleral icterus to suggest severe liver disease, no proptosis to suggest severe hyperthyroid    ENT -no obvious auditory deficits, no obvious tongue lesions, tongue midline, no facial droop     Skin -no rashes or lesions noted     Respiratory-  breathing comfortable on room air, no audible wheezing    Cardiovascular- capillary refills less than 2 seconds.     Psychiatric- alert and oriented ×3. Normal affect.     Gait - normal gait, no use of ambulatory device, nonantalgic.     Musculoskeletal and Neuro -       Cervical spine   Inspection: No deformities of the skin over the cervical spine. No rashes or lesions.    decreased  active range of motion in all directions, with  pain      Spurling’s sign: negative bilaterally    No signs of muscular atrophy in bilateral upper extremities     No tenderness to palpation of the cervical spine.  Positive for trigger points which reproduce the patient's pain.        Key points for the international standards for neurological classification of spinal cord injury (ISNCSCI) to light touch. "     Dermatome R L   C4 2 2   C5 2 2   C6 2 2   C7 2 2   C8 2 2   T1 2 2   T2 2 2                                     Motor Exam Upper Extremities   ? Myotome R L   Shoulder flexion C5 5 5   Elbow flexion C5 5 5   Wrist extension C6 5 5   Elbow extension C7 5 5   Finger flexion C8 5 5   Finger abduction T1 5 5     Reflexes  ?  R L   Biceps  2+ 2+   Brachioradialis  2+ 2+     Hernandez’s sign negative bilaterally            Thoracic/Lumbar Spine/Sacral Spine/Hips   Inspection: No evidence of atrophy in bilateral lower extremities throughout     ROM: decreased active range of motion with flexion, lateral flexion, and rotation bilaterally.   There is decreased active range of motion with lumbar extension with pain.    There is pain with facet loading maneuver (extension rotation) with axial low back pain on the BILATERAL side(s)    Palpation:   No tenderness to palpation in midline at T1-T12 levels. No tenderness to palpation in the left and right of the midline T1-L5, NEGATIVE for tenderness to palpation to the para-midline region in the lower lumbar levels.  palpation over SI joint: positive bilaterally    palpation in hip or over the gluteus medius tendon insertion: negative bilaterally      Lumbar spine Special tests  Neuro tension  Straight leg test negative bilaterally    Slump test negative bilaterally      HIP  FAIR test negative bilaterally    Range of motion in the RIGHT hip is full  in flexion, extension, abduction, internal rotation, external rotation.  Range of motion in the LEFT hip is full  in flexion, extension, abduction, internal rotation, external rotation.      SI joint tests  SI joint compression positive bilaterally    SI joint distraction positive bilaterally    Thigh thrust test positive bilaterally    NEFTALY test positive bilaterally                 Key points for the international standards for neurological classification of spinal cord injury (ISNCSCI) to light touch.     Dermatome R L                                       L2 2 2   L3 2 2   L4 2 2   L5 2 2   S1 2 2   S2 2 2       Motor Exam Lower Extremities    ? Myotome R L   Hip flexion L2 5 5   Knee extension L3 5 5   Ankle dorsiflexion L4 5 5   Toe extension L5 5 5   Ankle plantarflexion S1 5 5         Reflexes  ?  R L                Patella  2+ 2+   Achilles   2+ 2+       Babinski sign negative bilaterally   Clonus of the ankle negative bilaterally       MEDICAL DECISION MAKING    Medical records review: see under HPI section.     DATA    Labs:   Lab Results   Component Value Date/Time    SODIUM 137 02/11/2022 01:08 PM    POTASSIUM 4.8 02/11/2022 01:08 PM    CHLORIDE 105 02/11/2022 01:08 PM    CO2 18 (L) 02/11/2022 01:08 PM    ANION 14.0 02/11/2022 01:08 PM    GLUCOSE 119 (H) 02/11/2022 01:08 PM    BUN 24 (H) 02/11/2022 01:08 PM    CREATININE 1.03 02/11/2022 01:08 PM    CREATININE 0.6 05/11/2009 08:44 AM    CALCIUM 9.1 02/11/2022 01:08 PM    ASTSGOT 57 (H) 09/09/2015 09:06 AM    ALTSGPT 37 09/09/2015 09:06 AM    TBILIRUBIN 1.3 09/09/2015 09:06 AM    ALBUMIN 4.5 09/09/2015 09:06 AM    TOTPROTEIN 7.5 09/09/2015 09:06 AM    GLOBULIN 3.0 09/09/2015 09:06 AM    AGRATIO 1.5 09/09/2015 09:06 AM   ]    Lab Results   Component Value Date/Time    PROTHROMBTM 13.3 07/20/2012 01:52 PM    INR 1.00 07/20/2012 01:52 PM        Lab Results   Component Value Date/Time    WBC 4.6 (L) 09/09/2015 09:06 AM    RBC 4.63 04/14/2021 09:40 AM    RBC 4.54 09/09/2015 09:06 AM    HEMOGLOBIN 12.7 04/14/2021 09:40 AM    HEMOGLOBIN 12.1 09/09/2015 09:06 AM    HEMATOCRIT 39.1 04/14/2021 09:40 AM    HEMATOCRIT 39.6 09/09/2015 09:06 AM    MCV 84.4 04/14/2021 09:40 AM    MCV 87.2 09/09/2015 09:06 AM    MCH 27.4 04/14/2021 09:40 AM    MCH 26.7 (L) 09/09/2015 09:06 AM    MCHC 32.5 04/14/2021 09:40 AM    MCHC 30.6 (L) 09/09/2015 09:06 AM    MPV 8.5 04/14/2021 09:40 AM    MPV 11.1 09/09/2015 09:06 AM    NEUTSPOLYS 67 04/14/2021 09:40 AM    NEUTSPOLYS 70.60 09/09/2015 09:06 AM    LYMPHOCYTES 26  04/14/2021 09:40 AM    LYMPHOCYTES 22.60 09/09/2015 09:06 AM    MONOCYTES 5 04/14/2021 09:40 AM    MONOCYTES 4.30 09/09/2015 09:06 AM    EOSINOPHILS 2 04/14/2021 09:40 AM    EOSINOPHILS 1.50 09/09/2015 09:06 AM    BASOPHILS 1 04/14/2021 09:40 AM    BASOPHILS 0.60 09/09/2015 09:06 AM    HYPOCHROMIA 1+ 02/18/2014 09:01 AM    ANISOCYTOSIS 1+ 07/20/2012 01:09 PM        Lab Results   Component Value Date/Time    HBA1C 7.6 (H) 02/11/2022 01:08 PM        Imaging:   I personally reviewed following images, these are my reads  MRI lumbar spine 8/31/2021  Multilevel degenerative changes with degenerative disc disease.  Type I Modic changes at L5-S1.  Facet arthropathy worst bilaterally at L4-5 and L5-S1.  No areas of high-grade central canal or neuroforaminal stenosis.        IMAGING radiology reads. I reviewed the following radiology reads       Results for orders placed during the hospital encounter of 01/19/22    MR-BRAIN-W/O    Impression  No acute intracranial process.    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 This most  likely represents gliosis from previous insult. The cleft does not appear to be lined by cortex, however if there is concern for schizencephaly (which can cause seizures) consider 3-D FSPGR/SPACE T1 imaging to assess whether there is a gray matter lining  to the margins of this cleft.             Results for orders placed in visit on 08/31/21    MR-CERVICAL SPINE-W/O    Impression  Mild multilevel degenerative disc disease. No significant central canal or neural foraminal narrowing.      Results for orders placed in visit on 08/31/21    MR-LUMBAR SPINE-W/O    Impression  Mild multilevel degenerative disc disease and facet degeneration. No significant central canal or neural foraminal narrowing.      Results  for orders placed in visit on 08/31/21    MR-THORACIC SPINE-W/O    Impression  1.  Mild degenerative disc disease. No significant central canal or neural foraminal narrowing.    2.  Minimal chronic superior endplate compression fracture of T10. No significant posterior retropulsion.    Results for orders placed in visit on 08/31/21    MR-LUMBAR SPINE-W/O    Impression  Mild multilevel degenerative disc disease and facet degeneration. No significant central canal or neural foraminal narrowing.                                        Results for orders placed during the hospital encounter of 08/11/21    DX-CERVICAL SPINE-4+ VIEWS    Impression  Degenerative changes of the cervical spine without acute osseous abnormality.              Results for orders placed in visit on 11/10/21    DX-HAND 3+ RIGHT            Results for orders placed during the hospital encounter of 10/27/13    DX-LUMBAR SPINE-2 OR 3 VIEWS    Impression  Negative lumbar spine series.        INTERPRETING LOCATION: [address], [place], [address]   Results for orders placed during the hospital encounter of 08/11/21    DX-LUMBAR SPINE-4+ VIEWS    Impression  1.  Grossly stable multilevel degenerative changes of the lumbar spine.  2.  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 This can be a pain generator.              Results for orders placed during the hospital encounter of 10/27/13    DX-THORACIC SPINE-2 VIEWS    Impression  1. No acute appearing compression fracture.    2. Minimal potential or old compression of the anterior aspect of the superior endplate of T10.    3. Thoracolumbar junction scoliosis convex left.            INTERPRETING LOCATION: [address], [place], [address]            Diagnosis   Visit Diagnoses     ICD-10-CM   1. Sacroiliac joint dysfunction of both sides  M53.3   2. Lumbar spondylosis  M47.816   3. Lumbar degenerative disc disease with type 1 modic changes L5-S1  M51.36   4. Chronic  bilateral low back pain without sciatica  M54.50    G89.29   5. Cervical spondylosis  M47.812   6. Chronic neck pain  M54.2    G89.29   7. Myalgia  M79.10   8. Greater trochanteric bursitis of both hips  M70.61    M70.62           ASSESSMENT AND PLAN:  Meggan Caba Olga 52 y.o. female      Meggan was seen today for new patient.    Diagnoses and all orders for this visit:    Sacroiliac joint dysfunction of both sides  -     Referral to Physical Medicine Rehab  -     Referral to Physical Therapy    Lumbar spondylosis  -     Referral to Physical Therapy    Lumbar degenerative disc disease with type 1 modic changes L5-S1  -     Referral to Physical Therapy    Chronic bilateral low back pain without sciatica  -     Referral to Physical Therapy    Cervical spondylosis  -     Referral to Physical Therapy    Chronic neck pain  -     Referral to Physical Therapy    Myalgia  -     Referral to Physical Therapy    Greater trochanteric bursitis of both hips  -     Referral to Physical Therapy      I believe the patient's pain is multifactorial.  There are no areas of high-grade central canal or neuroforaminal stenosis.  There are no surgical needs based on imaging or clinically on exam today.  The patient understands emergency precautions.    I believe most of the back pain is coming from combination of sacroiliac joint pathology as well as lumbar spondylosis and degenerative disc disease with Modic changes.  Greater than 3 examined over positive for bilateral sacroiliac joint pain and this is where most of the patient's pain is.  We will start with treatments there.      Physical therapy: I ordered physical therapy to focus on strengthening and stretching.     home exercise program: I provided the patient with a strengthening and stretching with a home exercise program     Diagnostic workup: Procedure below for diagnostic and therapeutic purposes    Medications:   Continue gabapentin    Interventional program:   I have  ordered bilateral sacroiliac joint injection with fluoroscopic guidance for diagnostic and therapeutic purposes    The risks benefits and alternatives to this procedure were discussed and the patient wishes to proceed with the procedure. Risks include but are not limited to damage to surrounding structures, infection, bleeding, worsening of pain which can be permanent, weakness which can be permanent. Benefits include pain relief, improved function. Alternatives includes not doing the procedure.            Follow-up: Approximately 1 month after the above procedure          Please note that this dictation was created using voice recognition software. I have made every reasonable attempt to correct obvious errors but there may be errors of grammar and content that I may have overlooked prior to finalization of this note.      Luis Manuel Coronado MD  Physical Medicine and Rehabilitation  Interventional Spine and Sports Physiatry  [facility]         Faustina Painting M.D.

## 2022-10-06 NOTE — PROGRESS NOTES
New patient note    Interventional spine and Pain  Physiatry (physical medicine and  Rehabilitation)     Date of service: See epic    Chief complaint:   Chief Complaint   Patient presents with    New Patient     Neck pain        Referring provider: Faustina Painting M.D.     HISTORY    HPI: Meggan Espinoza 52 y.o.  who presents today with Diagnoses of Sacroiliac joint dysfunction of both sides, Lumbar spondylosis, Lumbar degenerative disc disease with type 1 modic changes L5-S1, Chronic bilateral low back pain without sciatica, Cervical spondylosis, Chronic neck pain, Myalgia, and Greater trochanteric bursitis of both hips were pertinent to this visit.    HPI    Chronic for greater than 6 months mostly axial neck pain with some radiation toward the bilateral shoulders mostly left shoulder 6 out of 10 intensity constant worse with neck movements.  Aching quality.    Chronic for greater than 6 months bilateral low back pain around the lower lumbar spine as well as around the sacroiliac joints with most of the pain around the sacroiliac joint 7 out of 10 in intensity aching quality.  Associated leg pain the back pain is significantly worse than the leg pain.  Denies specific radiating pain down the legs.  Denies numbness and tingling.    She has done a provider driven home exercise program including the past 6 months.  The patient has been going to water-based physical therapy including the past 3 months.    Medications tried include NSAIDs, acetaminophen, gabapentin for pain and for muscle relaxer, Meloxicam, oxycodone, tramadol    Medical records review:  I reviewed the note from the referring provider Faustina Painting M.D.  including the note dated 9/14/2022.     I reviewed the notes from orthopedic hand surgeon Dr. Mariusz Corrigan including from 6/ 17/2022.  Left hand carpal tunnel release improving.      ROS:   Red Flags ROS:   Fever, Chills, Sweats: Denies  Involuntary Weight Loss: Denies  Bladder Incontinence:  Denies  Bowel Incontinence: denies  Saddle Anesthesia: Denies    All other systems reviewed and negative.       PMHx:   Past Medical History:   Diagnosis Date    Arthritis     back    Bowel habit changes     IBS for years    Diabetes     Dyslipidemia     GERD (gastroesophageal reflux disease)     Heart burn     High cholesterol     HTN (hypertension)     Hypertension     Lower back pain     chronic    Vitamin deficiency          Current Outpatient Medications on File Prior to Visit   Medication Sig Dispense Refill    pantoprazole (PROTONIX) 40 MG Tablet Delayed Response Take 40 mg by mouth every day.      TROKENDI  MG CAPSULE SR 24 HR TAKE 1 CAPSULE BY MOUTH EVERY EVENING 30 Capsule 6    TRUE METRIX BLOOD GLUCOSE TEST strip USE AS DIRECTED 3-4 TIMES A DAY      LEVEMIR FLEXTOUCH 100 UNIT/ML injection PEN       Ubrogepant (UBRELVY) 100 MG Tab Take 1 Tablet by mouth as needed (1 tab at headache osnet, repeat in 2 hour prn). 6 Tablet 0    Galcanezumab-gnlm (EMGALITY) 120 MG/ML Solution Auto-injector INJECT 1 PEN SUBCUTANEOUSLY AS INSTRUCTED EVERY 4 WEEKS 1 mL 3    colestipol (COLESTID) 1 GM Tab Take 1 g by mouth 2 times a day.      INSULIN GLARGINE 100 UNIT/ML injection PEN Inject 24 Units as instructed every evening.      famotidine (PEPCID) 20 MG Tab       [employer] Lancets Ultra-Thin 30G Misc AS DIRECTED FOUR TIMES DAILY CHECK SUGARS 30 DAYS      metformin (GLUCOPHAGE) 1000 MG tablet Take 1,000 mg by mouth.      traZODone (DESYREL) 50 MG Tab Take 50 mg by mouth every evening.      gabapentin (NEURONTIN) 800 MG tablet Take 1 Tab by mouth 3 times a day. 90 Tab 0    atorvastatin (LIPITOR) 20 MG Tab TAKE 1 TABLET BY MOUTH EVERY DAY 30 Tab 3    Blood Glucose Monitoring Suppl (TRUE METRIX AIR GLUCOSE METER) Device 1 Each by Does not apply route 2 Times a Day. 1 Device 0    Misc. Devices Misc DM lancets Dispense brand covered by patients insurance Testing frequency: Twice dailyDx DM w/o complication. E11.9 100 Each 3     Misc. Devices Misc 1. Glucometer #1   2. Test strips and lancets to test blood sugars qid     #120 120 Each 6    lisinopril (PRINIVIL) 20 MG Tab Take 1 Tab by mouth every day. 30 Tab 11    Misc. Devices MISC Needles for solostar lantus use as directed 120 Each 6    zolmitriptan (ZOMIG) 5 MG tablet 1 tab at headache onset; repeat in 1 hour prn. (Patient not taking: Reported on 10/6/2022) 9 Tablet 6    OMEPRAZOLE PO Take  by mouth. (Patient not taking: Reported on 10/6/2022)       No current facility-administered medications on file prior to visit.        PSHx:   Past Surgical History:   Procedure Laterality Date    AR NEUROPLASTY & OR TRANSPOS MEDIAN NRV CARPAL MICHELLE Left 2/15/2022    Procedure: LEFT OPEN CARPAL TUNNEL RELEASE;  Surgeon: Mariusz Corrigan M.D.;  Location: SURGERY SAME DAY [facility];  Service: Orthopedics    AR SHLDR ARTHROSCOP,PART ACROMIOPLAS Right 9/22/2020    Procedure: DECOMPRESSION, SHOULDER, ARTHROSCOPIC - SUBACROMIAL, LABRAL DEBRIDEMENT;  Surgeon: Toribio Coronado M.D.;  Location: SURGERY [facility];  Service: Orthopedics    PB ARTHROSCOPY SHOULDER SURGICAL BICEPS TENODES* Right 9/22/2020    Procedure: ARTHROSCOPY, SHOULDER, WITH BICEPS TENODESIS;  Surgeon: Toribio Coronado M.D.;  Location: SURGERY [facility];  Service: Orthopedics    OTHER ORTHOPEDIC SURGERY  2017    left carpal tunnel release    CHOLECYSTECTOMY      TONSILLECTOMY      TUBAL COAGULATION LAPAROSCOPIC BILATERAL         Family history   Family History   Problem Relation Age of Onset    Hypertension Mother     Diabetes Mother          Medications: reviewed on epic.   Outpatient Medications Marked as Taking for the 10/6/22 encounter (Office Visit) with Luis Manuel Coronado M.D.   Medication Sig Dispense Refill    pantoprazole (PROTONIX) 40 MG Tablet Delayed Response Take 40 mg by mouth every day.      TROKENDI  MG CAPSULE SR 24 HR TAKE 1 CAPSULE BY MOUTH EVERY EVENING 30 Capsule 6    TRUE METRIX BLOOD GLUCOSE TEST  strip USE AS DIRECTED 3-4 TIMES A DAY      LEVEMIR FLEXTOUCH 100 UNIT/ML injection PEN       Ubrogepant (UBRELVY) 100 MG Tab Take 1 Tablet by mouth as needed (1 tab at headache osnet, repeat in 2 hour prn). 6 Tablet 0    Galcanezumab-gnlm (EMGALITY) 120 MG/ML Solution Auto-injector INJECT 1 PEN SUBCUTANEOUSLY AS INSTRUCTED EVERY 4 WEEKS 1 mL 3    colestipol (COLESTID) 1 GM Tab Take 1 g by mouth 2 times a day.      INSULIN GLARGINE 100 UNIT/ML injection PEN Inject 24 Units as instructed every evening.      famotidine (PEPCID) 20 MG Tab       [employer] Lancets Ultra-Thin 30G Misc AS DIRECTED FOUR TIMES DAILY CHECK SUGARS 30 DAYS      metformin (GLUCOPHAGE) 1000 MG tablet Take 1,000 mg by mouth.      traZODone (DESYREL) 50 MG Tab Take 50 mg by mouth every evening.      gabapentin (NEURONTIN) 800 MG tablet Take 1 Tab by mouth 3 times a day. 90 Tab 0    atorvastatin (LIPITOR) 20 MG Tab TAKE 1 TABLET BY MOUTH EVERY DAY 30 Tab 3    Blood Glucose Monitoring Suppl (TRUE METRIX AIR GLUCOSE METER) Device 1 Each by Does not apply route 2 Times a Day. 1 Device 0    Misc. Devices Misc DM lancets Dispense brand covered by patients insurance Testing frequency: Twice dailyDx DM w/o complication. E11.9 100 Each 3    Misc. Devices Misc 1. Glucometer #1   2. Test strips and lancets to test blood sugars qid     #120 120 Each 6    lisinopril (PRINIVIL) 20 MG Tab Take 1 Tab by mouth every day. 30 Tab 11    Misc. Devices MISC Needles for solostar lantus use as directed 120 Each 6        Allergies:   Allergies   Allergen Reactions    Nkda [No Known Drug Allergy]        Social Hx:   Social History     Socioeconomic History    Marital status:      Spouse name: Not on file    Number of children: Not on file    Years of education: Not on file    Highest education level: Not on file   Occupational History    Not on file   Tobacco Use    Smoking status: Never    Smokeless tobacco: Never   Vaping Use    Vaping Use: Never used   Substance and  "Sexual Activity    Alcohol use: No    Drug use: No    Sexual activity: Not on file   Other Topics Concern    Not on file   Social History Narrative    Not on file     Social Determinants of Health     Financial Resource Strain: Not on file   Food Insecurity: Not on file   Transportation Needs: Not on file   Physical Activity: Not on file   Stress: Not on file   Social Connections: Not on file   Intimate Partner Violence: Not on file   Housing Stability: Not on file         EXAMINATION     Physical Exam:   Vitals: /62 (BP Location: Right arm, Patient Position: Sitting, BP Cuff Size: Adult)   Pulse 85   Temp 36.1 °C (96.9 °F) (Temporal)   Ht 1.499 m (4' 11\")   Wt 70.3 kg (154 lb 15.7 oz)   SpO2 97%     Constitutional:   Body Habitus: Body mass index is 31.3 kg/m².  Cooperation: Fully cooperates with exam  Appearance: Well-groomed, well-nourished, not disheveled     Eyes: No scleral icterus to suggest severe liver disease, no proptosis to suggest severe hyperthyroid    ENT -no obvious auditory deficits, no obvious tongue lesions, tongue midline, no facial droop     Skin -no rashes or lesions noted     Respiratory-  breathing comfortable on room air, no audible wheezing    Cardiovascular- capillary refills less than 2 seconds.     Psychiatric- alert and oriented ×3. Normal affect.     Gait - normal gait, no use of ambulatory device, nonantalgic.     Musculoskeletal and Neuro -       Cervical spine   Inspection: No deformities of the skin over the cervical spine. No rashes or lesions.    decreased  active range of motion in all directions, with  pain      Spurling’s sign: negative bilaterally    No signs of muscular atrophy in bilateral upper extremities     No tenderness to palpation of the cervical spine.  Positive for trigger points which reproduce the patient's pain.        Key points for the international standards for neurological classification of spinal cord injury (ISNCSCI) to light touch. "     Dermatome R L   C4 2 2   C5 2 2   C6 2 2   C7 2 2   C8 2 2   T1 2 2   T2 2 2                                     Motor Exam Upper Extremities   ? Myotome R L   Shoulder flexion C5 5 5   Elbow flexion C5 5 5   Wrist extension C6 5 5   Elbow extension C7 5 5   Finger flexion C8 5 5   Finger abduction T1 5 5     Reflexes  ?  R L   Biceps  2+ 2+   Brachioradialis  2+ 2+     Hernandez’s sign negative bilaterally            Thoracic/Lumbar Spine/Sacral Spine/Hips   Inspection: No evidence of atrophy in bilateral lower extremities throughout     ROM: decreased active range of motion with flexion, lateral flexion, and rotation bilaterally.   There is decreased active range of motion with lumbar extension with pain.    There is pain with facet loading maneuver (extension rotation) with axial low back pain on the BILATERAL side(s)    Palpation:   No tenderness to palpation in midline at T1-T12 levels. No tenderness to palpation in the left and right of the midline T1-L5, NEGATIVE for tenderness to palpation to the para-midline region in the lower lumbar levels.  palpation over SI joint: positive bilaterally    palpation in hip or over the gluteus medius tendon insertion: negative bilaterally      Lumbar spine Special tests  Neuro tension  Straight leg test negative bilaterally    Slump test negative bilaterally      HIP  FAIR test negative bilaterally    Range of motion in the RIGHT hip is full  in flexion, extension, abduction, internal rotation, external rotation.  Range of motion in the LEFT hip is full  in flexion, extension, abduction, internal rotation, external rotation.      SI joint tests  SI joint compression positive bilaterally    SI joint distraction positive bilaterally    Thigh thrust test positive bilaterally    NEFTALY test positive bilaterally                 Key points for the international standards for neurological classification of spinal cord injury (ISNCSCI) to light touch.     Dermatome R L                                       L2 2 2   L3 2 2   L4 2 2   L5 2 2   S1 2 2   S2 2 2       Motor Exam Lower Extremities    ? Myotome R L   Hip flexion L2 5 5   Knee extension L3 5 5   Ankle dorsiflexion L4 5 5   Toe extension L5 5 5   Ankle plantarflexion S1 5 5         Reflexes  ?  R L                Patella  2+ 2+   Achilles   2+ 2+       Babinski sign negative bilaterally   Clonus of the ankle negative bilaterally       MEDICAL DECISION MAKING    Medical records review: see under HPI section.     DATA    Labs:   Lab Results   Component Value Date/Time    SODIUM 137 02/11/2022 01:08 PM    POTASSIUM 4.8 02/11/2022 01:08 PM    CHLORIDE 105 02/11/2022 01:08 PM    CO2 18 (L) 02/11/2022 01:08 PM    ANION 14.0 02/11/2022 01:08 PM    GLUCOSE 119 (H) 02/11/2022 01:08 PM    BUN 24 (H) 02/11/2022 01:08 PM    CREATININE 1.03 02/11/2022 01:08 PM    CREATININE 0.6 05/11/2009 08:44 AM    CALCIUM 9.1 02/11/2022 01:08 PM    ASTSGOT 57 (H) 09/09/2015 09:06 AM    ALTSGPT 37 09/09/2015 09:06 AM    TBILIRUBIN 1.3 09/09/2015 09:06 AM    ALBUMIN 4.5 09/09/2015 09:06 AM    TOTPROTEIN 7.5 09/09/2015 09:06 AM    GLOBULIN 3.0 09/09/2015 09:06 AM    AGRATIO 1.5 09/09/2015 09:06 AM   ]    Lab Results   Component Value Date/Time    PROTHROMBTM 13.3 07/20/2012 01:52 PM    INR 1.00 07/20/2012 01:52 PM        Lab Results   Component Value Date/Time    WBC 4.6 (L) 09/09/2015 09:06 AM    RBC 4.63 04/14/2021 09:40 AM    RBC 4.54 09/09/2015 09:06 AM    HEMOGLOBIN 12.7 04/14/2021 09:40 AM    HEMOGLOBIN 12.1 09/09/2015 09:06 AM    HEMATOCRIT 39.1 04/14/2021 09:40 AM    HEMATOCRIT 39.6 09/09/2015 09:06 AM    MCV 84.4 04/14/2021 09:40 AM    MCV 87.2 09/09/2015 09:06 AM    MCH 27.4 04/14/2021 09:40 AM    MCH 26.7 (L) 09/09/2015 09:06 AM    MCHC 32.5 04/14/2021 09:40 AM    MCHC 30.6 (L) 09/09/2015 09:06 AM    MPV 8.5 04/14/2021 09:40 AM    MPV 11.1 09/09/2015 09:06 AM    NEUTSPOLYS 67 04/14/2021 09:40 AM    NEUTSPOLYS 70.60 09/09/2015 09:06 AM    LYMPHOCYTES 26  04/14/2021 09:40 AM    LYMPHOCYTES 22.60 09/09/2015 09:06 AM    MONOCYTES 5 04/14/2021 09:40 AM    MONOCYTES 4.30 09/09/2015 09:06 AM    EOSINOPHILS 2 04/14/2021 09:40 AM    EOSINOPHILS 1.50 09/09/2015 09:06 AM    BASOPHILS 1 04/14/2021 09:40 AM    BASOPHILS 0.60 09/09/2015 09:06 AM    HYPOCHROMIA 1+ 02/18/2014 09:01 AM    ANISOCYTOSIS 1+ 07/20/2012 01:09 PM        Lab Results   Component Value Date/Time    HBA1C 7.6 (H) 02/11/2022 01:08 PM        Imaging:   I personally reviewed following images, these are my reads  MRI lumbar spine 8/31/2021  Multilevel degenerative changes with degenerative disc disease.  Type I Modic changes at L5-S1.  Facet arthropathy worst bilaterally at L4-5 and L5-S1.  No areas of high-grade central canal or neuroforaminal stenosis.        IMAGING radiology reads. I reviewed the following radiology reads       Results for orders placed during the hospital encounter of 01/19/22    MR-BRAIN-W/O    Impression  No acute intracranial process.    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 This most  likely represents gliosis from previous insult. The cleft does not appear to be lined by cortex, however if there is concern for schizencephaly (which can cause seizures) consider 3-D FSPGR/SPACE T1 imaging to assess whether there is a gray matter lining  to the margins of this cleft.             Results for orders placed in visit on 08/31/21    MR-CERVICAL SPINE-W/O    Impression  Mild multilevel degenerative disc disease. No significant central canal or neural foraminal narrowing.      Results for orders placed in visit on 08/31/21    MR-LUMBAR SPINE-W/O    Impression  Mild multilevel degenerative disc disease and facet degeneration. No significant central canal or neural foraminal narrowing.      Results  for orders placed in visit on 08/31/21    MR-THORACIC SPINE-W/O    Impression  1.  Mild degenerative disc disease. No significant central canal or neural foraminal narrowing.    2.  Minimal chronic superior endplate compression fracture of T10. No significant posterior retropulsion.    Results for orders placed in visit on 08/31/21    MR-LUMBAR SPINE-W/O    Impression  Mild multilevel degenerative disc disease and facet degeneration. No significant central canal or neural foraminal narrowing.                                        Results for orders placed during the hospital encounter of 08/11/21    DX-CERVICAL SPINE-4+ VIEWS    Impression  Degenerative changes of the cervical spine without acute osseous abnormality.              Results for orders placed in visit on 11/10/21    DX-HAND 3+ RIGHT            Results for orders placed during the hospital encounter of 10/27/13    DX-LUMBAR SPINE-2 OR 3 VIEWS    Impression  Negative lumbar spine series.        INTERPRETING LOCATION: [address], [place], [address]   Results for orders placed during the hospital encounter of 08/11/21    DX-LUMBAR SPINE-4+ VIEWS    Impression  1.  Grossly stable multilevel degenerative changes of the lumbar spine.  2.  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 This can be a pain generator.              Results for orders placed during the hospital encounter of 10/27/13    DX-THORACIC SPINE-2 VIEWS    Impression  1. No acute appearing compression fracture.    2. Minimal potential or old compression of the anterior aspect of the superior endplate of T10.    3. Thoracolumbar junction scoliosis convex left.            INTERPRETING LOCATION: [address], [place], [address]            Diagnosis   Visit Diagnoses     ICD-10-CM   1. Sacroiliac joint dysfunction of both sides  M53.3   2. Lumbar spondylosis  M47.816   3. Lumbar degenerative disc disease with type 1 modic changes L5-S1  M51.36   4. Chronic  bilateral low back pain without sciatica  M54.50    G89.29   5. Cervical spondylosis  M47.812   6. Chronic neck pain  M54.2    G89.29   7. Myalgia  M79.10   8. Greater trochanteric bursitis of both hips  M70.61    M70.62           ASSESSMENT AND PLAN:  Meggan Caba Olga 52 y.o. female      Meggan was seen today for new patient.    Diagnoses and all orders for this visit:    Sacroiliac joint dysfunction of both sides  -     Referral to Physical Medicine Rehab  -     Referral to Physical Therapy    Lumbar spondylosis  -     Referral to Physical Therapy    Lumbar degenerative disc disease with type 1 modic changes L5-S1  -     Referral to Physical Therapy    Chronic bilateral low back pain without sciatica  -     Referral to Physical Therapy    Cervical spondylosis  -     Referral to Physical Therapy    Chronic neck pain  -     Referral to Physical Therapy    Myalgia  -     Referral to Physical Therapy    Greater trochanteric bursitis of both hips  -     Referral to Physical Therapy      I believe the patient's pain is multifactorial.  There are no areas of high-grade central canal or neuroforaminal stenosis.  There are no surgical needs based on imaging or clinically on exam today.  The patient understands emergency precautions.    I believe most of the back pain is coming from combination of sacroiliac joint pathology as well as lumbar spondylosis and degenerative disc disease with Modic changes.  Greater than 3 examined over positive for bilateral sacroiliac joint pain and this is where most of the patient's pain is.  We will start with treatments there.      Physical therapy: I ordered physical therapy to focus on strengthening and stretching.     home exercise program: I provided the patient with a strengthening and stretching with a home exercise program     Diagnostic workup: Procedure below for diagnostic and therapeutic purposes    Medications:   Continue gabapentin    Interventional program:   I have  ordered bilateral sacroiliac joint injection with fluoroscopic guidance for diagnostic and therapeutic purposes    The risks benefits and alternatives to this procedure were discussed and the patient wishes to proceed with the procedure. Risks include but are not limited to damage to surrounding structures, infection, bleeding, worsening of pain which can be permanent, weakness which can be permanent. Benefits include pain relief, improved function. Alternatives includes not doing the procedure.            Follow-up: Approximately 1 month after the above procedure          Please note that this dictation was created using voice recognition software. I have made every reasonable attempt to correct obvious errors but there may be errors of grammar and content that I may have overlooked prior to finalization of this note.      Luis Manuel Coronado MD  Physical Medicine and Rehabilitation  Interventional Spine and Sports Physiatry  [facility]         Faustina Painting M.D.

## 2022-10-11 ENCOUNTER — APPOINTMENT (OUTPATIENT)
Dept: RADIOLOGY | Facility: REHABILITATION | Age: 52
End: 2022-10-11
Attending: PHYSICAL MEDICINE & REHABILITATION
Payer: MEDICAID

## 2022-10-11 ENCOUNTER — HOSPITAL ENCOUNTER (OUTPATIENT)
Facility: REHABILITATION | Age: 52
End: 2022-10-11
Attending: PHYSICAL MEDICINE & REHABILITATION | Admitting: PHYSICAL MEDICINE & REHABILITATION
Payer: MEDICAID

## 2022-10-11 VITALS
SYSTOLIC BLOOD PRESSURE: 149 MMHG | OXYGEN SATURATION: 98 % | BODY MASS INDEX: 30.67 KG/M2 | HEART RATE: 69 BPM | HEIGHT: 59 IN | RESPIRATION RATE: 18 BRPM | WEIGHT: 152.12 LBS | TEMPERATURE: 97.4 F | DIASTOLIC BLOOD PRESSURE: 88 MMHG

## 2022-10-11 PROCEDURE — 700111 HCHG RX REV CODE 636 W/ 250 OVERRIDE (IP)

## 2022-10-11 PROCEDURE — 700117 HCHG RX CONTRAST REV CODE 255

## 2022-10-11 PROCEDURE — G0260 INJ FOR SACROILIAC JT ANESTH: HCPCS

## 2022-10-11 RX ORDER — DEXAMETHASONE SODIUM PHOSPHATE 10 MG/ML
INJECTION, SOLUTION INTRAMUSCULAR; INTRAVENOUS
Status: COMPLETED
Start: 2022-10-11 | End: 2022-10-11

## 2022-10-11 RX ORDER — LIDOCAINE HYDROCHLORIDE 10 MG/ML
INJECTION, SOLUTION EPIDURAL; INFILTRATION; INTRACAUDAL; PERINEURAL
Status: COMPLETED
Start: 2022-10-11 | End: 2022-10-11

## 2022-10-11 RX ORDER — ROPIVACAINE HYDROCHLORIDE 5 MG/ML
INJECTION, SOLUTION EPIDURAL; INFILTRATION; PERINEURAL
Status: COMPLETED
Start: 2022-10-11 | End: 2022-10-11

## 2022-10-11 RX ADMIN — IOHEXOL 10 ML: 240 INJECTION, SOLUTION INTRATHECAL; INTRAVASCULAR; INTRAVENOUS; ORAL at 10:30

## 2022-10-11 RX ADMIN — DEXAMETHASONE SODIUM PHOSPHATE 10 MG: 10 INJECTION INTRAMUSCULAR; INTRAVENOUS at 10:32

## 2022-10-11 RX ADMIN — LIDOCAINE HYDROCHLORIDE 10 ML: 10 INJECTION, SOLUTION EPIDURAL; INFILTRATION; INTRACAUDAL; PERINEURAL at 10:33

## 2022-10-11 ASSESSMENT — PAIN DESCRIPTION - PAIN TYPE
TYPE: CHRONIC PAIN

## 2022-10-11 NOTE — INTERVAL H&P NOTE
H&P reviewed. The patient was examined and there are no changes to the H&P     Lungs clear to auscultation bilaterally.  No abdominal tenderness.  Heart regular rate and rhythm.  Vitals reviewed.    Proceed with the originally scheduled procedure.  The risks, benefits and alternatives were discussed.  The patient understands these.    Pre-Op Diagnosis Codes:     * Sacroiliac joint dysfunction of both sides [M53.3]  Procedure(s):  BILATERAL sacroiliac joint injection with fluoroscopic guidance    Luis Manuel Coronado MD  Physical Medicine and Rehabilitation  Interventional Spine and Sports Physiatry  [facility]

## 2022-10-11 NOTE — OP REPORT
Date of Service: 10/11/2022     Patient: Meggan Espinoza 52 y.o. female     MRN: [id number]     Physician/s: Luis Manuel Coronado MD    Pre-operative Diagnosis: Sacroiliac dysfunction    Post-operative Diagnosis: Sacroiliac dysfunction     Procedure: BILATERAL   Sacroiliac joint injection    Description of procedure:    The risks, benefits, and alternatives of the procedure were reviewed and discussed with the patient.  Written informed consent was freely obtained. A pre-procedural time-out was conducted by the physician verifying patient’s identity, procedure to be performed, procedure site and side, and allergy verification. Appropriate equipment was determined to be in place for the procedure.         In the fluoroscopy suite the patient was placed in a prone position and the skin was prepped and draped in the usual sterile fashion.     The fluoroscope was placed over the right  sacroiliac joint at the appropriate angle for joint entrance, and the target for injection was marked. A 27g needle was placed into each of the marked level(s), and approx 2cc of 1% Lidocaine was injected subcutaneously into the epidermal and dermal layers. The needle was removed. A 25g 3.5 inch needle was then placed down to the level of bone at the inferior/distal aspect of the joint. The needle was then retracted and carefully advanced into the joint capsule. The needle tip was then verified by a lateral view. In the AP view, contrast dye was used to highlight the joint line while the fluoroscope was running live. Following negative aspiration, approx 0.5mL of 1% lidocaine with 0.5mL of 10mg/mL dexamethasone was then injected. The needle was removed intact after restyleted.     The fluoroscope was placed over the left  sacroiliac joint at the appropriate angle for joint entrance, and the target for injection was marked. A 27g needle was placed into each of the marked level(s), and approx 2cc of 1% Lidocaine was injected subcutaneously  into the epidermal and dermal layers. The needle was removed. A 25g 3.5 inch needle was then placed down to the level of bone at the inferior/distal aspect of the joint. The needle was then retracted and carefully advanced into the joint capsule. The needle tip was then verified by a lateral view. In the AP view, contrast dye was used to highlight the joint line while the fluoroscope was running live. Following negative aspiration, approx 0.5mL of 1% lidocaine with 0.5mL of 10mg/mL dexamethasone was then injected. The needle was removed intact after restyleted.       The patient's low back was covered with a 4x4 gauze, the area was cleansed with sterile normal saline, and a dressing was applied. There were no complications noted.     The patient was then evaluated while standing and sitting, to observe the patient's pain level, noting if the patient received an immediate 100% relief on the side(s) of the injection.    Follow-up as scheduled    Luis Manuel Coronado MD  Physical Medicine and Rehabilitation  Interventional Spine and Sports Physiatry  [facility]  10/11/2022        CPT  sacroiliac joint with fluoroscopy [id number]

## 2022-10-11 NOTE — PROGRESS NOTES
1009 Pt received to pre procedure area. ID band and allergies verified. Vital signs taken and stable. Verified that patient has not taken NSAIDS, anticoagulants or blood thinners in past 5 days. Pt's history reviewed. Reviewed post op instructions with patient, questions answered, verbalized understanding. Pt seen by Dr. Coronado, procedure discussed, questions answered.  Pt took blood sugar this am =167    1041  Pt received to recovery area, report received from procedure RN Brionna . Vitals taken and stable. Patient tolerated po fluids and snack without difficulty. Dressing clean, dry and intact. Ice pack applied over dressing. Ride called.  1100  Pt ready for discharge, still waiting for ride.    1105 Pt seen by Dr. Coronado post procedure, orders received for discharge. Patient ambulatory without difficulty. Pt discharged to designated .

## 2022-10-13 ENCOUNTER — TELEPHONE (OUTPATIENT)
Dept: PHYSICAL MEDICINE AND REHAB | Facility: MEDICAL CENTER | Age: 52
End: 2022-10-13
Payer: MEDICAID

## 2022-10-13 NOTE — TELEPHONE ENCOUNTER
Called for post-sp check-up. Pt reported the following regarding the procedure site:    Change in pain?: felt better    Concerns?: No    Confirmed [facility] appt?: Yes

## 2022-10-18 ENCOUNTER — PHYSICAL THERAPY (OUTPATIENT)
Dept: PHYSICAL THERAPY | Facility: REHABILITATION | Age: 52
End: 2022-10-18
Attending: PHYSICAL MEDICINE & REHABILITATION
Payer: MEDICAID

## 2022-10-18 DIAGNOSIS — M53.3 SACROILIAC JOINT DYSFUNCTION OF BOTH SIDES: ICD-10-CM

## 2022-10-18 DIAGNOSIS — M70.62 GREATER TROCHANTERIC BURSITIS OF BOTH HIPS: ICD-10-CM

## 2022-10-18 DIAGNOSIS — G89.29 CHRONIC NECK PAIN: ICD-10-CM

## 2022-10-18 DIAGNOSIS — M54.50 CHRONIC BILATERAL LOW BACK PAIN WITHOUT SCIATICA: ICD-10-CM

## 2022-10-18 DIAGNOSIS — M51.36 LUMBAR DEGENERATIVE DISC DISEASE: ICD-10-CM

## 2022-10-18 DIAGNOSIS — M47.816 LUMBAR SPONDYLOSIS: ICD-10-CM

## 2022-10-18 DIAGNOSIS — G89.29 CHRONIC BILATERAL LOW BACK PAIN WITHOUT SCIATICA: ICD-10-CM

## 2022-10-18 DIAGNOSIS — M54.2 CHRONIC NECK PAIN: ICD-10-CM

## 2022-10-18 DIAGNOSIS — M47.812 CERVICAL SPONDYLOSIS: ICD-10-CM

## 2022-10-18 DIAGNOSIS — M70.61 GREATER TROCHANTERIC BURSITIS OF BOTH HIPS: ICD-10-CM

## 2022-10-18 DIAGNOSIS — M79.10 MYALGIA: ICD-10-CM

## 2022-10-18 PROCEDURE — 97162 PT EVAL MOD COMPLEX 30 MIN: CPT

## 2022-10-18 ASSESSMENT — ENCOUNTER SYMPTOMS
PAIN SCALE AT HIGHEST: 8
QUALITY: SHARP
PAIN TIMING: IN THE EVENING
PAIN SCALE: 6
PAIN TIMING: IN THE MORNING
PAIN SCALE AT LOWEST: 4
PAIN TIMING: EVERY MORNING

## 2022-10-18 NOTE — OP THERAPY EVALUATION
Outpatient Physical Therapy  INITIAL EVALUATION    [facility]  [address], [address]  [place] [address]  Phone:  [phone]    Date of Evaluation: 10/18/2022    Patient: Meggan Espinoza  YOB: 1970  MRN: [id number]     Referring Provider: Luis Manuel Coronado M.D.  [address]  [address]  [place] [address]   Referring Diagnosis Lumbar spondylosis [M47.816];Lumbar degenerative disc disease [M51.36];Chronic bilateral low back pain without sciatica [M54.50, G89.29];Cervical spondylosis [M47.812];Chronic neck pain [M54.2, G89.29];Myalgia [M79.10];Greater trochanteric bursitis of both hips [M70.61, M70.62];Sacroiliac joint dysfunction of both sides [M53.3]     Time Calculation  Start time: 1015  Stop time: 1100 Time Calculation (min): 45 minutes         Chief Complaint: No chief complaint on file.    Visit Diagnoses     ICD-10-CM   1. Lumbar spondylosis  M47.816   2. Lumbar degenerative disc disease  M51.36   3. Chronic bilateral low back pain without sciatica  M54.50    G89.29   4. Cervical spondylosis  M47.812   5. Chronic neck pain  M54.2    G89.29   6. Myalgia  M79.10   7. Greater trochanteric bursitis of both hips  M70.61    M70.62   8. Sacroiliac joint dysfunction of both sides  M53.3       Date of onset of impairment: 10/18/2022    Subjective:   History of Present Illness:     Mechanism of injury:  Several years of Chronic low back back pain which is limiting her daily activity and walking. S/P SI injections last week-no relief yet , right shoulder surgery.  Has had previous PT-not sure if it helped. + night pain secondary to positioning   Varies in intensity but has become more constant.  Bilateral LE weakness+  denies numbness and tingling  PMH:  - lumbar surgeries  Location:  bilateral lumbar radiates down to lateral thigh, leg and foot  Lumbar intermittent/Leg pain is constant  Relief:  lying on stomach  Aggs:  going up more than down stairs, sitting,  prolonged standing, walking , bending over -then standing back up  Meds:  Gabapentin  Activity level:  sedentary, household activities/cleaning at a slow pace, walking dogs 15 min  Walking tolerance 30 min or less   X ray:  .  Grossly stable multilevel degenerative changes of the lumbar spine.  2.  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 This can be a pain generator.                Pain:     Current pain ratin    At best pain ratin    At worst pain ratin    Location:  Lumbar  and bilateral LE    Quality:  Sharp    Pain timing:  Every morning, in the morning and in the evening  Social Support:     Lives in:  Apartment    Lives with:  Alone    Past Medical History:   Diagnosis Date    Arthritis     back    Bowel habit changes     IBS for years    Diabetes     Dyslipidemia     GERD (gastroesophageal reflux disease)     Heart burn     High cholesterol     HTN (hypertension)     Hypertension     Lower back pain     chronic    Vitamin deficiency      Past Surgical History:   Procedure Laterality Date    DC INJECTION,SACROILIAC JOINT Bilateral 10/11/2022    Procedure: BILATERAL sacroiliac joint injection with fluoroscopic guidance;  Surgeon: Luis Manuel Coronado M.D.;  [facility];  Service: Pain Management    DC NEUROPLASTY & OR TRANSPOS MEDIAN NRV CARPAL MICHELLE Left 2/15/2022    Procedure: LEFT OPEN CARPAL TUNNEL RELEASE;  Surgeon: Mariusz Corrigan M.D.;  Location: SURGERY SAME DAY [facility];  Service: Orthopedics    DC SHLDR ARTHROSCOP,PART ACROMIOPLAS Right 2020    Procedure: DECOMPRESSION, SHOULDER, ARTHROSCOPIC - SUBACROMIAL, LABRAL DEBRIDEMENT;  Surgeon: Toribio Coronado M.D.;  Location: SURGERY [facility];  Service: Orthopedics    PB ARTHROSCOPY SHOULDER SURGICAL BICEPS TENODES* Right 2020    Procedure: ARTHROSCOPY, SHOULDER, WITH BICEPS TENODESIS;  Surgeon: Toribio Coronado M.D.;  Location: [facility];   Service: Orthopedics    OTHER ORTHOPEDIC SURGERY  2017    left carpal tunnel release    CHOLECYSTECTOMY      TONSILLECTOMY      TUBAL COAGULATION LAPAROSCOPIC BILATERAL       Social History     Tobacco Use    Smoking status: Never    Smokeless tobacco: Never   Substance Use Topics    Alcohol use: No     Family and Occupational History     Socioeconomic History    Marital status:      Spouse name: Not on file    Number of children: Not on file    Years of education: Not on file    Highest education level: Not on file   Occupational History    Not on file       Objective     Static Posture     Comments  Swayback posture  Bilateral knee hyperextension    Observations   Central spine     Positive for hyperlordosis.    Postural Observations  Seated posture: fair  Standing posture: poor  Correction of posture: has no consistent effect      Hip Screen   Hip range of motion within functional limits.  Hip strength within functional limits    Tenderness     Left Hip   Tenderness in the greater trochanter.      Right Hip   Tenderness in the greater trochanter.     Active Range of Motion     Additional Active Range of Motion Details  FF: major limitation FingerTips  to knees increase//no worse  Backward BEND:  min restriction   SG R: WNL//no effect  SG L:  WNL//no effect  Prone Lying: decrease//better  EIL:  SEIL decrease//better   REIL decrease//bettwer         Strength:      Lower extremities   Normal left lower extremity strength  Normal right lower extremity strength    Tests     Left Pelvic Girdle/Sacrum   Negative: thigh thrust.     Right Pelvic Girdle/Sacrum   Negative: thigh thrust.     Left Hip   Negative NEFTALY, Gaenslen's, SI compression and SI distraction.   SLR: Negative.     Right Hip   Positive NEFTALY.   Negative Gaenslen's, SI compression and SI distraction.   SLR: Negative.     Additional Tests Details  + hamstring tightness      Therapeutic Exercises (CPT 01163):     1. REIL, x 10 every 2 hours    2.  prone on elbows, 1-2 min 3 x day    3. posture correction using lumbar roll      Therapeutic Exercise Summary: HEP listed above    Time-based treatments/modalities:           Assessment, Response and Plan:   Impairments: abnormal or restricted ROM, activity intolerance, impaired functional mobility, lacks appropriate home exercise program, limited ADL's and pain with function    Assessment details:  Patient presents with longstanding chronic low back pain with bilateral lateral LE pain to feet.  Symptoms limit her ability to walk >30 min, perform ADLS and to perform household tasks that require bending and going up and down stairs. . Patient symptoms are consistent with lumbar derangement syndrome and posture dysfunction and are responding to extension principles.  Symptoms centralized post REIL with clinician OP. SI cluster tests were negative Recommend continued PT to meet goals stated below.   Barriers to therapy:  Comorbidities  Prognosis: good    Goals:   Short Term Goals:   Patient able to complete ADLS with >25% decreased symptoms  Patient able to complete household tasks with >25% decreased symptoms  Patient able to walk >30 min with >25% decreased symptoms  Short term goal time span:  2-4 weeks      Long Term Goals:    RMQ <30% perceived disability  Patient able to walk > 45 min with >50% decreased symptoms  Long term goal time span:  6-8 weeks    Plan:   Therapy options:  Physical therapy treatment to continue  Planned therapy interventions:  Neuromuscular Re-education (CPT 09356), E Stim Unattended (CPT 70991) and Therapeutic Exercise (CPT 77895)  Frequency:  2x week  Duration in weeks:  8  Discussed with:  Patient  Plan details:  1-2 x week x 8 weeks    Functional Assessment Used  Syed Rickey Low Back Pain and Disability Score: 45.83     Referring provider co-signature:  I have reviewed this plan of care and my co-signature certifies the need for services.    Certification Period: 10/18/2022 to   12/13/22    Physician Signature: ________________________________ Date: ______________

## 2022-11-08 ENCOUNTER — APPOINTMENT (OUTPATIENT)
Dept: PHYSICAL THERAPY | Facility: REHABILITATION | Age: 52
End: 2022-11-08
Attending: PHYSICAL MEDICINE & REHABILITATION
Payer: MEDICAID

## 2022-11-10 ENCOUNTER — OFFICE VISIT (OUTPATIENT)
Dept: PHYSICAL MEDICINE AND REHAB | Facility: MEDICAL CENTER | Age: 52
End: 2022-11-10
Payer: MEDICAID

## 2022-11-10 VITALS
DIASTOLIC BLOOD PRESSURE: 68 MMHG | HEART RATE: 87 BPM | TEMPERATURE: 97.1 F | WEIGHT: 157.19 LBS | BODY MASS INDEX: 31.69 KG/M2 | OXYGEN SATURATION: 96 % | SYSTOLIC BLOOD PRESSURE: 122 MMHG | HEIGHT: 59 IN

## 2022-11-10 DIAGNOSIS — M47.812 CERVICAL SPONDYLOSIS: ICD-10-CM

## 2022-11-10 DIAGNOSIS — G89.29 CHRONIC BILATERAL LOW BACK PAIN WITHOUT SCIATICA: ICD-10-CM

## 2022-11-10 DIAGNOSIS — M54.50 CHRONIC BILATERAL LOW BACK PAIN WITHOUT SCIATICA: ICD-10-CM

## 2022-11-10 DIAGNOSIS — M53.3 SACROILIAC JOINT DYSFUNCTION OF BOTH SIDES: ICD-10-CM

## 2022-11-10 DIAGNOSIS — M51.36 LUMBAR DEGENERATIVE DISC DISEASE: ICD-10-CM

## 2022-11-10 DIAGNOSIS — M47.816 LUMBAR SPONDYLOSIS: ICD-10-CM

## 2022-11-10 PROCEDURE — 99214 OFFICE O/P EST MOD 30 MIN: CPT | Performed by: PHYSICAL MEDICINE & REHABILITATION

## 2022-11-10 ASSESSMENT — PAIN SCALES - GENERAL: PAINLEVEL: 6=MODERATE PAIN

## 2022-11-10 ASSESSMENT — PATIENT HEALTH QUESTIONNAIRE - PHQ9: CLINICAL INTERPRETATION OF PHQ2 SCORE: 0

## 2022-11-10 NOTE — PROGRESS NOTES
Follow up patient note  Interventional spine and Pain  Physiatry (physical medicine and  Rehabilitation)       Chief complaint:   Chief Complaint   Patient presents with    Follow-Up     Back pain          HISTORY    Please see new patient note by Dr Coronado,  for more details.     HPI  Patient identification: Meggan Espinoza ,  1970,   With Diagnoses of Sacroiliac joint dysfunction of both sides, Lumbar spondylosis, Lumbar degenerative disc disease, Chronic bilateral low back pain without sciatica, and Cervical spondylosis were pertinent to this visit.     Procedures:    10/11/2022 bilateral sacroiliac joint injection 30%.  Post procedure    The patient did get improvement after the sacroiliac joint injection however she still has bilateral low back pain with associated  Intermittent.  Moderate in intensity.  4 out of 10 intensity currently but can be 6 out of 10 in intensity.  Typically more diffuse pain than previously.  Nonradiating pain.  Aching in quality.  She has associated but nonradiating diffuse bilateral leg pain.  She is doing a provider driven home exercise program.      Medications tried include NSAIDs, acetaminophen, gabapentin for pain and for muscle relaxer, Meloxicam, oxycodone, tramadol     ROS Red Flags :   Fever, Chills, Sweats: Denies  Involuntary Weight Loss: Denies  Bowel/Bladder Incontinence: Denies  Saddle Anesthesia: Denies        PMHx:   Past Medical History:   Diagnosis Date    Arthritis     back    Bowel habit changes     IBS for years    Diabetes     Dyslipidemia     GERD (gastroesophageal reflux disease)     Heart burn     High cholesterol     HTN (hypertension)     Hypertension     Lower back pain     chronic    Vitamin deficiency        PSHx:   Past Surgical History:   Procedure Laterality Date    [place] INJECTION,SACROILIAC JOINT Bilateral 10/11/2022    Procedure: BILATERAL sacroiliac joint injection with fluoroscopic guidance;  Surgeon: Luis Manuel Coronado M.D.;  Location:  [facility];  Service: Pain Management    MI NEUROPLASTY & OR TRANSPOS MEDIAN NRV CARPAL MICHELLE Left 2/15/2022    Procedure: LEFT OPEN CARPAL TUNNEL RELEASE;  Surgeon: Mariusz Corrigan M.D.;  Location: SURGERY SAME DAY [facility];  Service: Orthopedics    MI SHLDR ARTHROSCOP,PART ACROMIOPLAS Right 9/22/2020    Procedure: DECOMPRESSION, SHOULDER, ARTHROSCOPIC - SUBACROMIAL, LABRAL DEBRIDEMENT;  Surgeon: Toribio Coronado M.D.;  Location: SURGERY [facility];  Service: Orthopedics    PB ARTHROSCOPY SHOULDER SURGICAL BICEPS TENODES* Right 9/22/2020    Procedure: ARTHROSCOPY, SHOULDER, WITH BICEPS TENODESIS;  Surgeon: Toribio Coronado M.D.;  Location: SURGERY [facility];  Service: Orthopedics    OTHER ORTHOPEDIC SURGERY  2017    left carpal tunnel release    CHOLECYSTECTOMY      TONSILLECTOMY      TUBAL COAGULATION LAPAROSCOPIC BILATERAL         Family history   Family History   Problem Relation Age of Onset    Hypertension Mother     Diabetes Mother          Medications:   Outpatient Medications Marked as Taking for the 11/10/22 encounter (Office Visit) with Luis Manuel Coronado M.D.   Medication Sig Dispense Refill    pantoprazole (PROTONIX) 40 MG Tablet Delayed Response Take 1 Tablet by mouth every day.      LEVEMIR FLEXTOUCH 100 UNIT/ML injection PEN       Ubrogepant (UBRELVY) 100 MG Tab Take 1 Tablet by mouth as needed (1 tab at headache osnet, repeat in 2 hour prn). 6 Tablet 0    Galcanezumab-gnlm (EMGALITY) 120 MG/ML Solution Auto-injector INJECT 1 PEN SUBCUTANEOUSLY AS INSTRUCTED EVERY 4 WEEKS 1 mL 3    colestipol (COLESTID) 1 GM Tab Take 1 Tablet by mouth 2 times a day.      INSULIN GLARGINE 100 UNIT/ML injection PEN Inject 24 Units under the skin every evening.      famotidine (PEPCID) 20 MG Tab       metformin (GLUCOPHAGE) 1000 MG tablet Take 1 Tablet by mouth.      traZODone (DESYREL) 50 MG Tab Take 1 Tablet by mouth every evening.      gabapentin (NEURONTIN) 800 MG tablet Take 1 Tab by  mouth 3 times a day. 90 Tab 0    atorvastatin (LIPITOR) 20 MG Tab TAKE 1 TABLET BY MOUTH EVERY DAY 30 Tab 3    lisinopril (PRINIVIL) 20 MG Tab Take 1 Tab by mouth every day. 30 Tab 11        Current Outpatient Medications on File Prior to Visit   Medication Sig Dispense Refill    pantoprazole (PROTONIX) 40 MG Tablet Delayed Response Take 1 Tablet by mouth every day.      LEVEMIR FLEXTOUCH 100 UNIT/ML injection PEN       Ubrogepant (UBRELVY) 100 MG Tab Take 1 Tablet by mouth as needed (1 tab at headache osnet, repeat in 2 hour prn). 6 Tablet 0    Galcanezumab-gnlm (EMGALITY) 120 MG/ML Solution Auto-injector INJECT 1 PEN SUBCUTANEOUSLY AS INSTRUCTED EVERY 4 WEEKS 1 mL 3    colestipol (COLESTID) 1 GM Tab Take 1 Tablet by mouth 2 times a day.      INSULIN GLARGINE 100 UNIT/ML injection PEN Inject 24 Units under the skin every evening.      famotidine (PEPCID) 20 MG Tab       metformin (GLUCOPHAGE) 1000 MG tablet Take 1 Tablet by mouth.      traZODone (DESYREL) 50 MG Tab Take 1 Tablet by mouth every evening.      gabapentin (NEURONTIN) 800 MG tablet Take 1 Tab by mouth 3 times a day. 90 Tab 0    atorvastatin (LIPITOR) 20 MG Tab TAKE 1 TABLET BY MOUTH EVERY DAY 30 Tab 3    lisinopril (PRINIVIL) 20 MG Tab Take 1 Tab by mouth every day. 30 Tab 11    TROKENDI  MG CAPSULE SR 24 HR TAKE 1 CAPSULE BY MOUTH EVERY EVENING 30 Capsule 6    TRUE METRIX BLOOD GLUCOSE TEST strip USE AS DIRECTED 3-4 TIMES A DAY      [employer] Lancets Ultra-Thin 30G Misc AS DIRECTED FOUR TIMES DAILY CHECK SUGARS 30 DAYS      Blood Glucose Monitoring Suppl (TRUE METRIX AIR GLUCOSE METER) Device 1 Each by Does not apply route 2 Times a Day. 1 Device 0    Misc. Devices Misc DM lancets Dispense brand covered by patients insurance Testing frequency: Twice dailyDx DM w/o complication. E11.9 100 Each 3    Misc. Devices Misc 1. Glucometer #1   2. Test strips and lancets to test blood sugars qid     #120 120 Each 6    Misc. Devices MISC Needles for solostar  "lantus use as directed 120 Each 6     No current facility-administered medications on file prior to visit.         Allergies:   Allergies   Allergen Reactions    Nkda [No Known Drug Allergy]        Social Hx:   Social History     Socioeconomic History    Marital status:      Spouse name: Not on file    Number of children: Not on file    Years of education: Not on file    Highest education level: Not on file   Occupational History    Not on file   Tobacco Use    Smoking status: Never    Smokeless tobacco: Never   Vaping Use    Vaping Use: Never used   Substance and Sexual Activity    Alcohol use: No    Drug use: No    Sexual activity: Not on file   Other Topics Concern    Not on file   Social History Narrative    Not on file     Social Determinants of Health     Financial Resource Strain: Not on file   Food Insecurity: Not on file   Transportation Needs: Not on file   Physical Activity: Not on file   Stress: Not on file   Social Connections: Not on file   Intimate Partner Violence: Not on file   Housing Stability: Not on file         EXAMINATION     Physical Exam:   Vitals: /68 (BP Location: Right arm, Patient Position: Sitting, BP Cuff Size: Adult long)   Pulse 87   Temp 36.2 °C (97.1 °F) (Temporal)   Ht 1.499 m (4' 11\")   Wt 71.3 kg (157 lb 3 oz)   SpO2 96%     Constitutional:   Body Habitus: Body mass index is 31.75 kg/m².  Cooperation: Fully cooperates with exam  Appearance: Well-groomed no disheveled    Respiratory-  breathing comfortable on room air, no audible wheezing  Cardiovascular- capillary refills less than 2 seconds. No lower extremity edema is noted.   Psychiatric- alert and oriented ×3. Normal affect.    MSK and Neuro: -      Thoracic/Lumbar Spine/Sacral Spine/Hips   There are no signs of infection around the injection sites.   full  active range of motion with flexion, lateral flexion, and rotation bilaterally.   There is full  active range of motion with lumbar " extension.      Palpation:   No tenderness to palpation in midline at T1-T12 levels. No tenderness to palpation in the left and right of the midline T1-L5, NEGATIVE for tenderness to palpation to the para-midline region in the lower lumbar levels.  palpation over SI joint: negative bilaterally    palpation in hip or over the gluteus medius tendon insertion: negative bilaterally      Lumbar spine Special tests  Neuro tension  Straight leg test negative bilaterally    Slump test negative bilaterally      Key points for the international standards for neurological classification of spinal cord injury (ISNCSCI) to light touch.     Dermatome R L                                      L2 2 2   L3 2 2   L4 2 2   L5 2 2   S1 2 2   S2 2 2         Motor Exam Lower Extremities    ? Myotome R L   Hip flexion L2 5 5   Knee extension L3 5 5   Ankle dorsiflexion L4 5 5   Toe extension L5 5 5   Ankle plantarflexion S1 5 5             MEDICAL DECISION MAKING    DATA    Labs:   Lab Results   Component Value Date/Time    SODIUM 137 02/11/2022 01:08 PM    POTASSIUM 4.8 02/11/2022 01:08 PM    CHLORIDE 105 02/11/2022 01:08 PM    CO2 18 (L) 02/11/2022 01:08 PM    GLUCOSE 119 (H) 02/11/2022 01:08 PM    BUN 24 (H) 02/11/2022 01:08 PM    CREATININE 1.03 02/11/2022 01:08 PM    CREATININE 0.6 05/11/2009 08:44 AM        Lab Results   Component Value Date/Time    PROTHROMBTM 13.3 07/20/2012 01:52 PM    INR 1.00 07/20/2012 01:52 PM        Lab Results   Component Value Date/Time    WBC 4.6 (L) 09/09/2015 09:06 AM    RBC 4.63 04/14/2021 09:40 AM    RBC 4.54 09/09/2015 09:06 AM    HEMOGLOBIN 12.7 04/14/2021 09:40 AM    HEMOGLOBIN 12.1 09/09/2015 09:06 AM    HEMATOCRIT 39.1 04/14/2021 09:40 AM    HEMATOCRIT 39.6 09/09/2015 09:06 AM    MCV 84.4 04/14/2021 09:40 AM    MCV 87.2 09/09/2015 09:06 AM    MCH 27.4 04/14/2021 09:40 AM    MCH 26.7 (L) 09/09/2015 09:06 AM    MCHC 32.5 04/14/2021 09:40 AM    MCHC 30.6 (L) 09/09/2015 09:06 AM    MPV 8.5 04/14/2021  09:40 AM    MPV 11.1 09/09/2015 09:06 AM    NEUTSPOLYS 67 04/14/2021 09:40 AM    NEUTSPOLYS 70.60 09/09/2015 09:06 AM    LYMPHOCYTES 26 04/14/2021 09:40 AM    LYMPHOCYTES 22.60 09/09/2015 09:06 AM    MONOCYTES 5 04/14/2021 09:40 AM    MONOCYTES 4.30 09/09/2015 09:06 AM    EOSINOPHILS 2 04/14/2021 09:40 AM    EOSINOPHILS 1.50 09/09/2015 09:06 AM    BASOPHILS 1 04/14/2021 09:40 AM    BASOPHILS 0.60 09/09/2015 09:06 AM    HYPOCHROMIA 1+ 02/18/2014 09:01 AM    ANISOCYTOSIS 1+ 07/20/2012 01:09 PM        Lab Results   Component Value Date/Time    HBA1C 7.6 (H) 02/11/2022 01:08 PM          Imaging:   I personally reviewed following images    MRI lumbar spine 8/31/2021  Multilevel degenerative changes with degenerative disc disease.  Type I Modic changes at L5-S1.  Facet arthropathy worst bilaterally at L4-5 and L5-S1.  No areas of high-grade central canal or neuroforaminal stenosis.        I reviewed the following radiology reports          Results for orders placed during the hospital encounter of 01/19/22    MR-BRAIN-W/O    Impression  No acute intracranial process.    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 This most  likely represents gliosis from previous insult. The cleft does not appear to be lined by cortex, however if there is concern for schizencephaly (which can cause seizures) consider 3-D FSPGR/SPACE T1 imaging to assess whether there is a gray matter lining  to the margins of this cleft.             Results for orders placed in visit on 08/31/21    MR-CERVICAL SPINE-W/O    Impression  Mild multilevel degenerative disc disease. No significant central canal or neural foraminal narrowing.      Results for orders placed in visit on 08/31/21    MR-LUMBAR SPINE-W/O    Impression  Mild multilevel degenerative disc  disease and facet degeneration. No significant central canal or neural foraminal narrowing.      Results for orders placed in visit on 08/31/21    MR-THORACIC SPINE-W/O    Impression  1.  Mild degenerative disc disease. No significant central canal or neural foraminal narrowing.    2.  Minimal chronic superior endplate compression fracture of T10. No significant posterior retropulsion.    Results for orders placed in visit on 08/31/21    MR-LUMBAR SPINE-W/O    Impression  Mild multilevel degenerative disc disease and facet degeneration. No significant central canal or neural foraminal narrowing.                                                                       Results for orders placed during the hospital encounter of 07/20/12    CT-ABDOMEN-PELVIS W/ IV Contrast (R/O acute, uncomplicated appendicitis or diverticulitis, bowel ischemia) FOR HIGH BMI PATIENTS    Addendum 7/20/2012  3:22 PM  Gallbladder is not identified with certainty and may be markedly contracted.    Impression  1. Nondistended segments of the appendix as evidence against acute appendicitis.  2. No evidence of bowel obstruction or free fluid.  3. Low-attenuation liver consistent with fatty change and splenomegaly.  No ascites.  4. Bilateral renal scarring.  No hydronephrosis.  5. 2.6-cm right ovarian cyst.  6. Right adnexal clip.  Possible left clip migration into the anterior abdominal cavity.                     Results for orders placed during the hospital encounter of 08/11/21    DX-CERVICAL SPINE-4+ VIEWS    Impression  Degenerative changes of the cervical spine without acute osseous abnormality.              Results for orders placed in visit on 11/10/21    DX-HAND 3+ RIGHT            Results for orders placed during the hospital encounter of 10/27/13    DX-LUMBAR SPINE-2 OR 3 VIEWS    Impression  Negative lumbar spine series.        INTERPRETING LOCATION: [address] [place] [address], [id number]   Results for orders placed during the hospital  encounter of 21    DX-LUMBAR SPINE-4+ VIEWS    Impression  1.  Grossly stable multilevel degenerative changes of the lumbar spine.  2.  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 This can be a pain generator.              Results for orders placed during the hospital encounter of 10/27/13    DX-THORACIC SPINE-2 VIEWS    Impression  1. No acute appearing compression fracture.    2. Minimal potential or old compression of the anterior aspect of the superior endplate of T10.    3. Thoracolumbar junction scoliosis convex left.            INTERPRETING LOCATION: [address] [place] [address], [address]            DIAGNOSIS   Visit Diagnoses     ICD-10-CM   1. Sacroiliac joint dysfunction of both sides  M53.3   2. Lumbar spondylosis  M47.816   3. Lumbar degenerative disc disease  M51.36   4. Chronic bilateral low back pain without sciatica  M54.50    G89.29   5. Cervical spondylosis  M47.812         ASSESSMENT and PLAN:     Meggan Caba Olga  1970 female      Meggan was seen today for follow-up.    Diagnoses and all orders for this visit:    Sacroiliac joint dysfunction of both sides    Lumbar spondylosis    Lumbar degenerative disc disease    Chronic bilateral low back pain without sciatica    Cervical spondylosis      Symptoms improved after the SI joint injection I believe the patient is having pain in her lumbar spine which is mostly secondary to facet mediated pain and degenerative disc disease.  However exam maneuvers were negative for facet loading procedure today.  We discussed additions to the home exercise program.  The patient reports having upcoming physical therapy.  I also recommended weight loss.    If she has worsening of low back pain after physical therapy and conservative treatments and I would consider her for medial branch blocks.    Medications: Continue gabapentin.  Labs reviewed.  Patient continues acetaminophen up to 1000 mg 3 times daily as needed.   PCP to consider Cymbalta which could be helpful for pain and mood.  I recommend against chronic opioid therapy for this patient.    Follow up: After the above conservative treatments.    Thank you for allowing me to participate in the care of this patient. If you have any questions please not hesitate to contact me.             Please note that this dictation was created using voice recognition software. I have made every reasonable attempt to correct obvious errors but there may be errors of grammar and content that I may have overlooked prior to finalization of this note.      Luis Manuel Coronado MD  Interventional Spine and Sports Physiatry  Physical Medicine and Rehabilitation  [facility]

## 2022-11-15 ENCOUNTER — APPOINTMENT (OUTPATIENT)
Dept: PHYSICAL THERAPY | Facility: REHABILITATION | Age: 52
End: 2022-11-15
Attending: PHYSICAL MEDICINE & REHABILITATION
Payer: MEDICAID

## 2022-11-22 ENCOUNTER — APPOINTMENT (OUTPATIENT)
Dept: PHYSICAL THERAPY | Facility: REHABILITATION | Age: 52
End: 2022-11-22
Attending: PHYSICAL MEDICINE & REHABILITATION
Payer: MEDICAID

## 2022-11-27 DIAGNOSIS — G43.119 MIGRAINE WITH AURA, INTRACTABLE, WITHOUT STATUS MIGRAINOSUS: ICD-10-CM

## 2022-11-28 NOTE — TELEPHONE ENCOUNTER
Received request via: Pharmacy    Was the patient seen in the last year in this department? Yes    Does the patient have an active prescription (recently filled or refills available) for medication(s) requested? Yes.     Does the patient have group home Plus and need 100 day supply (blood pressure, diabetes and cholesterol meds only)? No

## 2022-11-29 ENCOUNTER — PHYSICAL THERAPY (OUTPATIENT)
Dept: PHYSICAL THERAPY | Facility: REHABILITATION | Age: 52
End: 2022-11-29
Attending: PHYSICAL MEDICINE & REHABILITATION
Payer: MEDICAID

## 2022-11-29 DIAGNOSIS — M51.36 LUMBAR DEGENERATIVE DISC DISEASE: ICD-10-CM

## 2022-11-29 DIAGNOSIS — M47.816 LUMBAR SPONDYLOSIS: ICD-10-CM

## 2022-11-29 PROCEDURE — 97112 NEUROMUSCULAR REEDUCATION: CPT

## 2022-11-29 PROCEDURE — 97110 THERAPEUTIC EXERCISES: CPT

## 2022-11-29 RX ORDER — TOPIRAMATE 200 MG/1
CAPSULE, EXTENDED RELEASE ORAL
Qty: 30 CAPSULE | Refills: 6 | Status: SHIPPED | OUTPATIENT
Start: 2022-11-29 | End: 2022-12-12 | Stop reason: SDUPTHER

## 2022-11-29 NOTE — OP THERAPY DAILY TREATMENT
Outpatient Physical Therapy  DAILY TREATMENT     [facility] [address], [address]  [place] [address]  Phone:  [phone]    Date: 11/29/2022    Patient: Meggan Espinoza  YOB: 1970  MRN: [id number]     Time Calculation    Start time: 0845  Stop time: 0930 Time Calculation (min): 45 minutes         Chief Complaint: chronic back pain     Visit #: 2    SUBJECTIVE:  Patient had initial eval 10/18/2022, cold  temps makes back pain worse .   Symptoms are about the same since initial eval.  Walking dogs daily , pain limits her walks to under 30 minleft is worse than right    OBJECTIVE:  Current objective measures:           Therapeutic Exercises (CPT 13635):     1. REIL, x 10 every 2 hours, centralizing    2. prone on elbows, 1-2 min 3 x day    3. posture correction using lumbar roll    4. stepper L2-3, 10 min      Therapeutic Exercise Summary: Access Code: [email or web address]  URL: [email or web address]  Date: 11/29/2022  Prepared by: Taylor Jackson    Exercises  Supine Bridge - 1 x daily - 7 x weekly - 1 sets - 5 reps - 30 hold  Clamshell - 1 x daily - 7 x weekly - 3 sets - 10 reps  Prone Press Up - 6 x daily - 7 x weekly - 1 sets - 10 reps      Therapeutic Treatments and Modalities:     1. Neuromuscular Re-education (CPT 29076), lumbar extension mob prone in neutral 2 x10 with inhalation/exhalation  Time-based treatments/modalities:    Physical Therapy Timed Treatment Charges  Neuromusc re-ed, balance, coor, post minutes (CPT 47442): 10 minutes  Therapeutic exercise minutes (CPT 47027): 30 minutes    ASSESSMENT:   Response to treatment: Patient responding well to extension principles and was educated on the importance of consistency and frequency with REIL as it centralized symptoms.  Added beginning posterior chain/core stability clams and bridges with positive response.  Patient given handout of HEP    PLAN/RECOMMENDATIONS:   Plan for treatment: therapy treatment to  continue next visit.  Planned interventions for next visit: continue with current treatment.

## 2022-12-01 ENCOUNTER — PHYSICAL THERAPY (OUTPATIENT)
Dept: PHYSICAL THERAPY | Facility: REHABILITATION | Age: 52
End: 2022-12-01
Attending: PHYSICAL MEDICINE & REHABILITATION
Payer: MEDICAID

## 2022-12-01 DIAGNOSIS — M51.36 LUMBAR DEGENERATIVE DISC DISEASE: ICD-10-CM

## 2022-12-01 DIAGNOSIS — G89.29 CHRONIC BILATERAL LOW BACK PAIN WITHOUT SCIATICA: ICD-10-CM

## 2022-12-01 DIAGNOSIS — M54.50 CHRONIC BILATERAL LOW BACK PAIN WITHOUT SCIATICA: ICD-10-CM

## 2022-12-01 DIAGNOSIS — M47.816 LUMBAR SPONDYLOSIS: ICD-10-CM

## 2022-12-01 PROCEDURE — 97110 THERAPEUTIC EXERCISES: CPT

## 2022-12-01 NOTE — OP THERAPY DAILY TREATMENT
Outpatient Physical Therapy  DAILY TREATMENT     [facility] Outpatient Physical Therapy [place] [id number] [facility], [address]  [place] [address]  Phone:  [phone]    Date: 12/01/2022    Patient: Meggan Espinoza  YOB: 1970  MRN: [id number]     Time Calculation    Start time: 1515  Stop time: 1600 Time Calculation (min): 45 minutes         Chief Complaint: chronic back problem  Visit #: 3    SUBJECTIVE:  Left lateral leg pain when going down stairs on the way to the appartment    OBJECTIVE:  Current objective measures:           Therapeutic Exercises (CPT 08593):     1. REIL, x 10 every 2 hours, centralizing    2. prone on elbows, 1-2 min 3 x day    3. posture correction using lumbar roll    4. stepper L2-3, 10 min    5. clamshell with pink tband, 1 x 10, 3 x 30 sec holds b    6. bridge with pink resistance tband, 2 x 10, 2 x 30 sec hold with arms at 90 degrees flex    7. sit to stand with pink tband, x 15      Therapeutic Exercise Summary: Access Code: [email or web address]  URL: [email or web address]  Date: 11/29/2022  Prepared by: Taylor Jackson    Exercises  Supine Bridge - 1 x daily - 7 x weekly - 1 sets - 5 reps - 30 hold  Clamshell - 1 x daily - 7 x weekly - 3 sets - 10 reps  Prone Press Up - 6 x daily - 7 x weekly - 1 sets - 10 reps      Therapeutic Treatments and Modalities:     1. Neuromuscular Re-education (CPT 70694), lumbar extension mob prone in neutral 2 x10 with inhalation/exhalation, increase//NW  Time-based treatments/modalities:       ASSESSMENT:   Response to treatment: decrease/better with lumbar/abd stability there ex.  Pateint has difficulty initiating TA contraction and posterior pelvic tilt.  Added both of these to HEP.  Added resistance to bridge, clams and sit to stand with positive results.  Continue with current POC.      PLAN/RECOMMENDATIONS:   Plan for treatment: therapy treatment to continue next visit.  Planned interventions for next visit: continue with current treatment.

## 2022-12-06 ENCOUNTER — APPOINTMENT (OUTPATIENT)
Dept: PHYSICAL THERAPY | Facility: REHABILITATION | Age: 52
End: 2022-12-06
Attending: PHYSICAL MEDICINE & REHABILITATION
Payer: MEDICAID

## 2022-12-12 ENCOUNTER — PHYSICAL THERAPY (OUTPATIENT)
Dept: PHYSICAL THERAPY | Facility: REHABILITATION | Age: 52
End: 2022-12-12
Attending: PHYSICAL MEDICINE & REHABILITATION
Payer: MEDICAID

## 2022-12-12 ENCOUNTER — OFFICE VISIT (OUTPATIENT)
Dept: NEUROLOGY | Facility: MEDICAL CENTER | Age: 52
End: 2022-12-12
Attending: PSYCHIATRY & NEUROLOGY
Payer: MEDICAID

## 2022-12-12 VITALS
OXYGEN SATURATION: 96 % | BODY MASS INDEX: 31.11 KG/M2 | DIASTOLIC BLOOD PRESSURE: 64 MMHG | WEIGHT: 154.32 LBS | RESPIRATION RATE: 17 BRPM | SYSTOLIC BLOOD PRESSURE: 112 MMHG | HEART RATE: 90 BPM | TEMPERATURE: 97 F | HEIGHT: 59 IN

## 2022-12-12 DIAGNOSIS — G43.119 MIGRAINE WITH AURA, INTRACTABLE, WITHOUT STATUS MIGRAINOSUS: Primary | ICD-10-CM

## 2022-12-12 DIAGNOSIS — M51.36 LUMBAR DEGENERATIVE DISC DISEASE: ICD-10-CM

## 2022-12-12 DIAGNOSIS — M47.816 LUMBAR SPONDYLOSIS: ICD-10-CM

## 2022-12-12 DIAGNOSIS — T65.91XA COGNITIVE IMPAIRMENT DUE TO TOXIC EFFECT OF SUBSTANCE: ICD-10-CM

## 2022-12-12 DIAGNOSIS — R41.89 COGNITIVE IMPAIRMENT DUE TO TOXIC EFFECT OF SUBSTANCE: ICD-10-CM

## 2022-12-12 PROCEDURE — 97112 NEUROMUSCULAR REEDUCATION: CPT

## 2022-12-12 PROCEDURE — 99213 OFFICE O/P EST LOW 20 MIN: CPT | Performed by: PSYCHIATRY & NEUROLOGY

## 2022-12-12 PROCEDURE — 97110 THERAPEUTIC EXERCISES: CPT

## 2022-12-12 PROCEDURE — 99212 OFFICE O/P EST SF 10 MIN: CPT | Performed by: PSYCHIATRY & NEUROLOGY

## 2022-12-12 RX ORDER — UBROGEPANT 100 MG/1
1 TABLET ORAL PRN
Qty: 8 TABLET | Refills: 0 | COMMUNITY
Start: 2022-12-12 | End: 2023-06-07 | Stop reason: SDUPTHER

## 2022-12-12 RX ORDER — TOPIRAMATE 200 MG/1
220 CAPSULE, EXTENDED RELEASE ORAL EVERY EVENING
Qty: 90 CAPSULE | Refills: 3 | Status: SHIPPED | OUTPATIENT
Start: 2022-12-12 | End: 2023-02-16

## 2022-12-12 RX ORDER — GALCANEZUMAB 120 MG/ML
INJECTION, SOLUTION SUBCUTANEOUS
Qty: 3 ML | Refills: 0 | COMMUNITY
Start: 2022-12-12 | End: 2023-06-19

## 2022-12-12 ASSESSMENT — ENCOUNTER SYMPTOMS
TREMORS: 0
HEADACHES: 1
TINGLING: 0
INSOMNIA: 0
MEMORY LOSS: 0
LOSS OF CONSCIOUSNESS: 0

## 2022-12-12 ASSESSMENT — FIBROSIS 4 INDEX: FIB4 SCORE: 0.62

## 2022-12-12 NOTE — PROGRESS NOTES
Subjective     Meggan Espinoza is a 52 y.o. female who presents for follow-up, with a history of migraine with aura and persistent, aggravating, cognitive symptoms and word finding difficulties related to Trokendi.     HPI    Migraine: On Emgality 120 mg injections every 4 weeks, she typically injects on the 15th of each month, she has had very good headache control.  She will occasionally get milder headaches for which she takes nothing, but once or twice a month she will use Ubrelvy 100 mg as a single tablet dose which works consistently and effectively.  She does not need to repeat it.    Again, her insurance fails to cover the Emgality and Ubrelvy, we are providing samples every 3 months.  She remains on Trokendi  mg every evening.    Cognitive impairment: Complaining of the word finding difficulties and mental slowing since she has been on Trokendi, we had always talked about getting her off the drug but she is more concerned about headache recurrence then the cognitive and word finding difficulties.  It is still frustrating for her.  She is independent otherwise with her ADLs, finances, driving, behaviors, scheduling, medications, etc.  She has noted the symptoms for years, they have not progressed to any great degree, it is simply that they remain, certain intervals of time over days and weeks seemingly worse than others.    Medical, surgical and family histories are reviewed, there are no new drug allergies.  She is on Trokendi  mg every evening, Ubrelvy 100 mg as needed, Emgality 120 mg injections every 4 weeks, also gabapentin, Prinivil, Lipitor, Colestid, Pepcid, glargine and Levemir insulins, Glucophage, trazodone and Protonix.    Review of Systems   Neurological:  Positive for headaches. Negative for tingling, tremors and loss of consciousness.   Psychiatric/Behavioral:  Negative for memory loss. The patient does not have insomnia.    All other systems reviewed and are  "negative.    Objective     /64 (BP Location: Right arm, Patient Position: Sitting, BP Cuff Size: Adult)   Pulse 90   Temp 36.1 °C (97 °F) (Temporal)   Resp 17   Ht 1.499 m (4' 11.02\")   Wt 70 kg (154 lb 5.2 oz)   LMP 07/02/2015   SpO2 96%   BMI 31.15 kg/m²      Physical Exam    She appears in no acute distress.  Vital signs are stable.  There is no malar rash, temporal or jaw tenderness, or jaw claudication.  The neck is supple, range of motion is full.  Cardiac evaluation reveals a regular rhythm.     Neurological Exam    Fully oriented, there is no aphasia, apraxia, or inattention.  The cognitive slowing remains, there is some occasional word finding difficulty, but paraphasic errors are not used.  She clearly identifies the difficulty.  Verbal and visual comprehension are actually quite good.    PERRLA/EOMI, visual fields are full, facial movements are symmetric, sensory exam is intact to temperature and light touch bilaterally.  The tongue and uvula are midline, there is no bulbar dysfunction.  Jaw movements are intact, shoulder shrug and head rotation are normal.    Musculoskeletal exam reveals normal tone without tremor, asterixis, or drift.  Strength is intact in all 4 extremities.  Reflexes are diminished but present throughout, none are dropped, there are no asymmetries from right to left.  Both toes are downgoing.    She stands easily, armswing is symmetric, gait is slightly widened in station.  Stride length is maintained.  There is no appendicular dystaxia.  Repetitive movements are normal with symmetric amplitude and frequencies in all 4 extremities.    Sensory exam is intact to vibration and temperature, Romberg is absent.    Assessment & Plan     1. Migraine with aura, intractable, without status migrainosus  Stable, for now we will simply give her samples of her Emgality and Ubrelvy. I agree that discontinuing Trokendi may prove problematic for her.  We have tried in the past with " unpleasant results.  She will call back in another 3 months for her next series of samples of her Emgality.  We can follow-up in 1 year.    -Topiramate ER (TROKENDI XR) 200 MG CAPSULE SR 24 HR; Take 1 Capsule 24 hour sustained release by mouth every evening.  Dispense: 90 Capsule; Refill: 3  - Galcanezumab-gnlm (EMGALITY) 120 MG/ML Solution Auto-injector; INJECT 1 PEN SUBCUTANEOUSLY AS INSTRUCTED EVERY 4 WEEKS  Dispense: 3 mL; Refill: 0  - Ubrogepant (UBRELVY) 100 MG Tab; Take 1 Tablet by mouth as needed (1 tab at headache osnet, repeat in 2 hour prn).  Dispense: 8 Tablet; Refill: 0    2. 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  I do not think she is showing signs of a primary progressive aphasia or and other frontal predominant neurodegenerative dementing illness.  She has been stable like this for years.    Time: 20 minutes in total spent on patient care including per charting, record review, discussion with healthcare staff and documentation.  This includes face-to-face time for exam, review, discussion, as well as counseling and coordinating care.

## 2022-12-12 NOTE — OP THERAPY DAILY TREATMENT
Outpatient Physical Therapy  DAILY TREATMENT     [facility] [place] [address] [facility], [address]  [place] [address]  Phone:  [phone]    Date: 12/12/2022    Patient: Meggan Espinoza  YOB: 1970  MRN: [id number]     Time Calculation    Start time: 0935  Stop time: 1015 Time Calculation (min): 40 minutes         Chief Complaint: back pain  Visit #: 4    SUBJECTIVE: Back has not been bothering her too much  Going to neurologist for migraines today      OBJECTIVE:  Current objective measures:           Therapeutic Exercises (CPT 82888):     1. REIL, x 10 every 2 hours    2. prone on elbows, 1-2 min 3 x day    3. posture correction using lumbar roll    4. stepper L4, 10 min    5. clamshell with pink tband, 1 x 10, 3 x 30 sec holds b, nt    6. bridge with pink resistance tband, 2 x 10, 2 x 30 sec hold with arms at 90 degrees flex    7. sit to stand with green  tband, 3 X 10 (1X10 WITH 5LB WEIGHT)    8. lateral walk wieth green tband, x 1 min EACH DIRECTION      Therapeutic Exercise Summary: Access Code: [email or web address]  URL: [email or web address]  Date: 11/29/2022  Prepared by: Taylor Jackson    Exercises  Supine Bridge - 1 x daily - 7 x weekly - 1 sets - 5 reps - 30 hold  Clamshell - 1 x daily - 7 x weekly - 3 sets - 10 reps  Prone Press Up - 6 x daily - 7 x weekly - 1 sets - 10 reps      Therapeutic Treatments and Modalities:     1. Neuromuscular Re-education (CPT 60583), lumbar extension mob prone in neutral 2 x10 with inhalation/exhalation, increase//NW  Time-based treatments/modalities:    Physical Therapy Timed Treatment Charges  Neuromusc re-ed, balance, coor, post minutes (CPT 53638): 8 minutes  Therapeutic exercise minutes (CPT 34153): 35 minutes    ASSESSMENT:   Response to treatment: improving tolerance to core stability and posterior chain strength.  Encouraged patient to be more consistent with home program to maximize results/positive  outcome    PLAN/RECOMMENDATIONS:   Plan for treatment: therapy treatment to continue next visit.  Planned interventions for next visit: continue with current treatment.

## 2022-12-15 ENCOUNTER — PHYSICAL THERAPY (OUTPATIENT)
Dept: PHYSICAL THERAPY | Facility: REHABILITATION | Age: 52
End: 2022-12-15
Attending: PHYSICAL MEDICINE & REHABILITATION
Payer: MEDICAID

## 2022-12-15 DIAGNOSIS — M54.50 CHRONIC BILATERAL LOW BACK PAIN WITHOUT SCIATICA: ICD-10-CM

## 2022-12-15 DIAGNOSIS — M47.816 LUMBAR SPONDYLOSIS: ICD-10-CM

## 2022-12-15 DIAGNOSIS — M51.36 LUMBAR DEGENERATIVE DISC DISEASE: ICD-10-CM

## 2022-12-15 DIAGNOSIS — G89.29 CHRONIC BILATERAL LOW BACK PAIN WITHOUT SCIATICA: ICD-10-CM

## 2022-12-15 PROCEDURE — 97110 THERAPEUTIC EXERCISES: CPT

## 2022-12-15 PROCEDURE — 97112 NEUROMUSCULAR REEDUCATION: CPT

## 2022-12-15 NOTE — OP THERAPY DAILY TREATMENT
Outpatient Physical Therapy  DAILY TREATMENT     [facility] [address], [address]  [place] [address]  Phone:  [phone]    Date: 12/15/2022    Patient: Meggan Espinoza  YOB: 1970  MRN: [id number]     Time Calculation    Start time: 1100  Stop time: 1145 Time Calculation (min): 45 minutes         Chief Complaint: back an dleg problem    Visit #: 5    SUBJECTIVE:  Increased right anterior thigh soreness after going home from PT appointment    OBJECTIVE:  Current objective measures: decrease//better back with reil with clinician overpressure          Therapeutic Exercises (CPT 61551):     1. REIL, x 10 every 2 hours    2. prone on elbows, 1-2 min 3 x day    3. posture correction using lumbar roll    4. stepper L5 x 5 min, L4.5 5 min, 10 min    5. clamshell with pink tband, 1 x 10, 3 x 30 sec holds b    6. bridge with pink resistance tband, 2 x 10, 2 x 30 sec hold with arms at 90 degrees flex    7. sit to stand with green  tband, 3 X 10 (1X10 WITH 5LB WEIGHT)    8. lateral walk wieth green tband, x 1 min EACH DIRECTION      Therapeutic Exercise Summary: Access Code: [email or web address]  URL: [email or web address]  Date: 11/29/2022  Prepared by: Taylor Jackson    Exercises  Supine Bridge - 1 x daily - 7 x weekly - 1 sets - 5 reps - 30 hold  Clamshell - 1 x daily - 7 x weekly - 3 sets - 10 reps  Prone Press Up - 6 x daily - 7 x weekly - 1 sets - 10 reps      Therapeutic Treatments and Modalities:     1. Neuromuscular Re-education (CPT 04494), lumbar extension mob prone in neutral 2 x10 with inhalation/exhalation, increase//NW  Time-based treatments/modalities:    Physical Therapy Timed Treatment Charges  Neuromusc re-ed, balance, coor, post minutes (CPT 78904): 8 minutes  Therapeutic exercise minutes (CPT 92274): 35 minutes      ASSESSMENT:   Response to treatment: Patient is responding well to posterior chain/core work as well as REIL which decreases symptoms.   Recommended patient follow HEP with 1 set of REIL secondary to patient is feeling back symptoms after resisted exercises.  COntinue with current POC    PLAN/RECOMMENDATIONS:   Plan for treatment: therapy treatment to continue next visit.  Planned interventions for next visit: continue with current treatment.

## 2022-12-21 ENCOUNTER — OFFICE VISIT (OUTPATIENT)
Dept: PHYSICAL MEDICINE AND REHAB | Facility: MEDICAL CENTER | Age: 52
End: 2022-12-21

## 2022-12-21 ENCOUNTER — APPOINTMENT (OUTPATIENT)
Dept: PHYSICAL MEDICINE AND REHAB | Facility: MEDICAL CENTER | Age: 52
End: 2022-12-21
Payer: MEDICAID

## 2022-12-21 DIAGNOSIS — M53.3 SACROILIAC JOINT DYSFUNCTION OF BOTH SIDES: ICD-10-CM

## 2022-12-21 PROCEDURE — 99999 PR NO CHARGE: CPT | Performed by: PHYSICAL MEDICINE & REHABILITATION

## 2023-01-05 ENCOUNTER — OFFICE VISIT (OUTPATIENT)
Dept: PHYSICAL MEDICINE AND REHAB | Facility: MEDICAL CENTER | Age: 53
End: 2023-01-05
Payer: MEDICAID

## 2023-01-05 VITALS
OXYGEN SATURATION: 96 % | HEIGHT: 59 IN | SYSTOLIC BLOOD PRESSURE: 120 MMHG | BODY MASS INDEX: 32.13 KG/M2 | TEMPERATURE: 96.8 F | DIASTOLIC BLOOD PRESSURE: 60 MMHG | WEIGHT: 159.39 LBS | HEART RATE: 89 BPM

## 2023-01-05 DIAGNOSIS — M51.36 LUMBAR DEGENERATIVE DISC DISEASE: ICD-10-CM

## 2023-01-05 DIAGNOSIS — M47.816 LUMBAR SPONDYLOSIS: ICD-10-CM

## 2023-01-05 DIAGNOSIS — M19.012 ARTHRITIS OF LEFT ACROMIOCLAVICULAR JOINT: ICD-10-CM

## 2023-01-05 DIAGNOSIS — G89.29 CHRONIC NECK PAIN: ICD-10-CM

## 2023-01-05 DIAGNOSIS — M54.2 CHRONIC NECK PAIN: ICD-10-CM

## 2023-01-05 DIAGNOSIS — M25.512 CHRONIC LEFT SHOULDER PAIN: ICD-10-CM

## 2023-01-05 DIAGNOSIS — M53.3 SACROILIAC JOINT DYSFUNCTION OF BOTH SIDES: ICD-10-CM

## 2023-01-05 DIAGNOSIS — G89.29 CHRONIC LEFT SHOULDER PAIN: ICD-10-CM

## 2023-01-05 DIAGNOSIS — M47.812 CERVICAL SPONDYLOSIS: ICD-10-CM

## 2023-01-05 PROCEDURE — 20606 DRAIN/INJ JOINT/BURSA W/US: CPT | Performed by: PHYSICAL MEDICINE & REHABILITATION

## 2023-01-05 PROCEDURE — 99214 OFFICE O/P EST MOD 30 MIN: CPT | Mod: 25 | Performed by: PHYSICAL MEDICINE & REHABILITATION

## 2023-01-05 RX ORDER — DEXAMETHASONE SODIUM PHOSPHATE 4 MG/ML
4 INJECTION, SOLUTION INTRA-ARTICULAR; INTRALESIONAL; INTRAMUSCULAR; INTRAVENOUS; SOFT TISSUE ONCE
Status: COMPLETED | OUTPATIENT
Start: 2023-01-05 | End: 2023-01-05

## 2023-01-05 RX ADMIN — DEXAMETHASONE SODIUM PHOSPHATE 4 MG: 4 INJECTION, SOLUTION INTRA-ARTICULAR; INTRALESIONAL; INTRAMUSCULAR; INTRAVENOUS; SOFT TISSUE at 11:44

## 2023-01-05 ASSESSMENT — PATIENT HEALTH QUESTIONNAIRE - PHQ9: CLINICAL INTERPRETATION OF PHQ2 SCORE: 0

## 2023-01-05 ASSESSMENT — FIBROSIS 4 INDEX: FIB4 SCORE: 0.63

## 2023-01-05 ASSESSMENT — PAIN SCALES - GENERAL: PAINLEVEL: 5=MODERATE PAIN

## 2023-01-05 NOTE — PROGRESS NOTES
Follow up patient note  Interventional spine and Pain  Physiatry (physical medicine and  Rehabilitation)       Chief complaint:   Chief Complaint   Patient presents with    Follow-Up     Back pain          HISTORY    Please see new patient note by Dr Coronado,  for more details.     HPI  Patient identification: Meggan Espinoza ,  1970,   With Diagnoses of Arthritis of left acromioclavicular joint, Chronic left shoulder pain, Lumbar spondylosis, Lumbar degenerative disc disease, Sacroiliac joint dysfunction of both sides, Cervical spondylosis, and Chronic neck pain were pertinent to this visit.     Procedures:  10/11/2022 bilateral sacroiliac joint injection 30%.  Post procedure    Bilateral low back pain and bilateral neck pain or typically nonradiating, intermittent, low-grade in intensity 3 out of 10 in intensity.    Majority the patient's pain is in the left shoulder.  Worse in the anterior shoulder.  Chronic.  6 out of 10 in intensity.  Worse with crossarm movements and overhead movements.      Medications tried include NSAIDs, acetaminophen, gabapentin for pain and for muscle relaxer, Meloxicam, oxycodone, tramadol     ROS Red Flags :   Fever, Chills, Sweats: Denies  Involuntary Weight Loss: Denies  Bowel/Bladder Incontinence: Denies  Saddle Anesthesia: Denies        PMHx:   Past Medical History:   Diagnosis Date    Arthritis     back    Bowel habit changes     IBS for years    Diabetes     Dyslipidemia     GERD (gastroesophageal reflux disease)     Heart burn     High cholesterol     HTN (hypertension)     Hypertension     Lower back pain     chronic    Vitamin deficiency        PSHx:   Past Surgical History:   Procedure Laterality Date    ID INJECTION,SACROILIAC JOINT Bilateral 10/11/2022    Procedure: BILATERAL sacroiliac joint injection with fluoroscopic guidance;  Surgeon: Luis Manuel Coronado M.D.;  Location: [facility];  Service: Pain Management    ID NEUROPLASTY & OR TRANSPOS  MEDIAN NRV CARPAL MICHELLE Left 2/15/2022    Procedure: LEFT OPEN CARPAL TUNNEL RELEASE;  Surgeon: Mariusz Corrigan M.D.;  Location: SURGERY SAME DAY [facility];  Service: Orthopedics    HI SHLDR ARTHROSCOP,PART ACROMIOPLAS Right 9/22/2020    Procedure: DECOMPRESSION, SHOULDER, ARTHROSCOPIC - SUBACROMIAL, LABRAL DEBRIDEMENT;  Surgeon: Toribio Coronado M.D.;  Location: SURGERY [facility];  Service: Orthopedics    PB ARTHROSCOPY SHOULDER SURGICAL BICEPS TENODES* Right 9/22/2020    Procedure: ARTHROSCOPY, SHOULDER, WITH BICEPS TENODESIS;  Surgeon: Toribio Coronado M.D.;  Location: SURGERY [facility];  Service: Orthopedics    OTHER ORTHOPEDIC SURGERY  2017    left carpal tunnel release    CHOLECYSTECTOMY      TONSILLECTOMY      TUBAL COAGULATION LAPAROSCOPIC BILATERAL         Family history   Family History   Problem Relation Age of Onset    Hypertension Mother     Diabetes Mother          Medications:   Outpatient Medications Marked as Taking for the 1/5/23 encounter (Office Visit) with Luis Manuel Coronado M.D.   Medication Sig Dispense Refill    Topiramate ER (TROKENDI XR) 200 MG CAPSULE SR 24 HR Take 1 Capsule 24 hour sustained release by mouth every evening. 90 Capsule 3    Galcanezumab-gnlm (EMGALITY) 120 MG/ML Solution Auto-injector INJECT 1 PEN SUBCUTANEOUSLY AS INSTRUCTED EVERY 4 WEEKS 3 mL 0    Ubrogepant (UBRELVY) 100 MG Tab Take 1 Tablet by mouth as needed (1 tab at headache osnet, repeat in 2 hour prn). 8 Tablet 0    pantoprazole (PROTONIX) 40 MG Tablet Delayed Response Take 1 Tablet by mouth every day.      TRUE METRIX BLOOD GLUCOSE TEST strip USE AS DIRECTED 3-4 TIMES A DAY      LEVEMIR FLEXTOUCH 100 UNIT/ML injection PEN       colestipol (COLESTID) 1 GM Tab Take 1 Tablet by mouth 2 times a day.      INSULIN GLARGINE 100 UNIT/ML injection PEN Inject 24 Units under the skin every evening.      famotidine (PEPCID) 20 MG Tab       [employer] Lancets Ultra-Thin 30G Misc AS DIRECTED FOUR TIMES DAILY CHECK SUGARS  30 DAYS      metformin (GLUCOPHAGE) 1000 MG tablet Take 1 Tablet by mouth.      traZODone (DESYREL) 50 MG Tab Take 1 Tablet by mouth every evening.      gabapentin (NEURONTIN) 800 MG tablet Take 1 Tab by mouth 3 times a day. 90 Tab 0    atorvastatin (LIPITOR) 20 MG Tab TAKE 1 TABLET BY MOUTH EVERY DAY 30 Tab 3    Blood Glucose Monitoring Suppl (TRUE METRIX AIR GLUCOSE METER) Device 1 Each by Does not apply route 2 Times a Day. 1 Device 0    Misc. Devices Misc DM lancets Dispense brand covered by patients insurance Testing frequency: Twice dailyDx DM w/o complication. E11.9 100 Each 3    Misc. Devices Misc 1. Glucometer #1   2. Test strips and lancets to test blood sugars qid     #120 120 Each 6    lisinopril (PRINIVIL) 20 MG Tab Take 1 Tab by mouth every day. 30 Tab 11    Misc. Devices MISC Needles for solostar lantus use as directed 120 Each 6        Current Outpatient Medications on File Prior to Visit   Medication Sig Dispense Refill    Topiramate ER (TROKENDI XR) 200 MG CAPSULE SR 24 HR Take 1 Capsule 24 hour sustained release by mouth every evening. 90 Capsule 3    Galcanezumab-gnlm (EMGALITY) 120 MG/ML Solution Auto-injector INJECT 1 PEN SUBCUTANEOUSLY AS INSTRUCTED EVERY 4 WEEKS 3 mL 0    Ubrogepant (UBRELVY) 100 MG Tab Take 1 Tablet by mouth as needed (1 tab at headache osnet, repeat in 2 hour prn). 8 Tablet 0    pantoprazole (PROTONIX) 40 MG Tablet Delayed Response Take 1 Tablet by mouth every day.      TRUE METRIX BLOOD GLUCOSE TEST strip USE AS DIRECTED 3-4 TIMES A DAY      LEVEMIR FLEXTOUCH 100 UNIT/ML injection PEN       colestipol (COLESTID) 1 GM Tab Take 1 Tablet by mouth 2 times a day.      INSULIN GLARGINE 100 UNIT/ML injection PEN Inject 24 Units under the skin every evening.      famotidine (PEPCID) 20 MG Tab       [employer] Lancets Ultra-Thin 30G Misc AS DIRECTED FOUR TIMES DAILY CHECK SUGARS 30 DAYS      metformin (GLUCOPHAGE) 1000 MG tablet Take 1 Tablet by mouth.      traZODone (DESYREL) 50 MG Tab  Take 1 Tablet by mouth every evening.      gabapentin (NEURONTIN) 800 MG tablet Take 1 Tab by mouth 3 times a day. 90 Tab 0    atorvastatin (LIPITOR) 20 MG Tab TAKE 1 TABLET BY MOUTH EVERY DAY 30 Tab 3    Blood Glucose Monitoring Suppl (TRUE METRIX AIR GLUCOSE METER) Device 1 Each by Does not apply route 2 Times a Day. 1 Device 0    Misc. Devices Misc DM lancets Dispense brand covered by patients insurance Testing frequency: Twice dailyDx DM w/o complication. E11.9 100 Each 3    Misc. Devices Misc 1. Glucometer #1   2. Test strips and lancets to test blood sugars qid     #120 120 Each 6    lisinopril (PRINIVIL) 20 MG Tab Take 1 Tab by mouth every day. 30 Tab 11    Misc. Devices MISC Needles for solostar lantus use as directed 120 Each 6     No current facility-administered medications on file prior to visit.         Allergies:   Allergies   Allergen Reactions    Nkda [No Known Drug Allergy]        Social Hx:   Social History     Socioeconomic History    Marital status:      Spouse name: Not on file    Number of children: Not on file    Years of education: Not on file    Highest education level: Not on file   Occupational History    Not on file   Tobacco Use    Smoking status: Never    Smokeless tobacco: Never   Vaping Use    Vaping Use: Never used   Substance and Sexual Activity    Alcohol use: No    Drug use: No    Sexual activity: Not on file   Other Topics Concern    Not on file   Social History Narrative    Not on file     Social Determinants of Health     Financial Resource Strain: Not on file   Food Insecurity: Not on file   Transportation Needs: Not on file   Physical Activity: Not on file   Stress: Not on file   Social Connections: Not on file   Intimate Partner Violence: Not on file   Housing Stability: Not on file         EXAMINATION     Physical Exam:   Vitals: /60 (BP Location: Right arm, Patient Position: Sitting, BP Cuff Size: Adult)   Pulse 89   Temp 36 °C (96.8 °F) (Temporal)   Ht  "1.499 m (4' 11\")   Wt 72.3 kg (159 lb 6.3 oz)   SpO2 96%     Constitutional:   Body Habitus: Body mass index is 32.19 kg/m².  Cooperation: Fully cooperates with exam  Appearance: Well-groomed no disheveled    Respiratory-  breathing comfortable on room air, no audible wheezing  Cardiovascular- capillary refills less than 2 seconds. No lower extremity edema is noted.   Psychiatric- alert and oriented ×3. Normal affect.    MSK and Neuro: -    Left shoulder with tenderness palpation at the acromioclavicular joint.  No tense palpation along the clavicle or the remainder of the shoulder.  Crossarm movement reproduces pain at the acromioclavicular joint.        MEDICAL DECISION MAKING    DATA    Labs:   Lab Results   Component Value Date/Time    SODIUM 144 03/23/2022 03:54 PM    SODIUM 137 02/11/2022 01:08 PM    POTASSIUM 4.4 03/23/2022 03:54 PM    POTASSIUM 4.8 02/11/2022 01:08 PM    CHLORIDE 110 03/23/2022 03:54 PM    CHLORIDE 105 02/11/2022 01:08 PM    CO2 23.0 03/23/2022 03:54 PM    CO2 18 (L) 02/11/2022 01:08 PM    GLUCOSE 165 (H) 03/23/2022 03:54 PM    GLUCOSE 119 (H) 02/11/2022 01:08 PM    BUN 22.0 03/23/2022 03:54 PM    BUN 24 (H) 02/11/2022 01:08 PM    CREATININE 1.0 03/23/2022 03:54 PM    CREATININE 1.03 02/11/2022 01:08 PM    CREATININE 0.6 05/11/2009 08:44 AM    BUNCREATRAT 22.0 03/23/2022 03:54 PM        Lab Results   Component Value Date/Time    PROTHROMBTM 13.3 07/20/2012 01:52 PM    INR 1.00 07/20/2012 01:52 PM        Lab Results   Component Value Date/Time    WBC 5.50 03/23/2022 03:54 PM    WBC 4.6 (L) 09/09/2015 09:06 AM    RBC 4.11 03/23/2022 03:54 PM    RBC 4.54 09/09/2015 09:06 AM    HEMOGLOBIN 11.0 (L) 03/23/2022 03:54 PM    HEMOGLOBIN 12.1 09/09/2015 09:06 AM    HEMATOCRIT 34.8 (L) 03/23/2022 03:54 PM    HEMATOCRIT 39.6 09/09/2015 09:06 AM    MCV 84.8 03/23/2022 03:54 PM    MCV 87.2 09/09/2015 09:06 AM    MCH 26.9 (L) 03/23/2022 03:54 PM    MCH 26.7 (L) 09/09/2015 09:06 AM    MCHC 31.7 (L) " 03/23/2022 03:54 PM    MCHC 30.6 (L) 09/09/2015 09:06 AM    MPV 8.6 03/23/2022 03:54 PM    MPV 11.1 09/09/2015 09:06 AM    NEUTSPOLYS 67.0 03/23/2022 03:54 PM    NEUTSPOLYS 70.60 09/09/2015 09:06 AM    LYMPHOCYTES 23.1 03/23/2022 03:54 PM    LYMPHOCYTES 22.60 09/09/2015 09:06 AM    MONOCYTES 6.3 03/23/2022 03:54 PM    MONOCYTES 4.30 09/09/2015 09:06 AM    EOSINOPHILS 3.2 03/23/2022 03:54 PM    EOSINOPHILS 1.50 09/09/2015 09:06 AM    BASOPHILS 0.4 03/23/2022 03:54 PM    BASOPHILS 0.60 09/09/2015 09:06 AM    HYPOCHROMIA 1+ 02/18/2014 09:01 AM    ANISOCYTOSIS 1+ 07/20/2012 01:09 PM        Lab Results   Component Value Date/Time    HBA1C 7.6 (H) 02/11/2022 01:08 PM          Imaging:   I personally reviewed following images    MRI lumbar spine 8/31/2021  Multilevel degenerative changes with degenerative disc disease.  Type I Modic changes at L5-S1.  Facet arthropathy worst bilaterally at L4-5 and L5-S1.  No areas of high-grade central canal or neuroforaminal stenosis.        I reviewed the following radiology reports          Results for orders placed during the hospital encounter of 01/19/22    MR-BRAIN-W/O    Impression  No acute intracranial process.    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 This most  likely represents gliosis from previous insult. The cleft does not appear to be lined by cortex, however if there is concern for schizencephaly (which can cause seizures) consider 3-D FSPGR/SPACE T1 imaging to assess whether there is a gray matter lining  to the margins of this cleft.             Results for orders placed in visit on 08/31/21    MR-CERVICAL SPINE-W/O    Impression  Mild multilevel degenerative disc disease. No significant central canal or neural foraminal narrowing.      Results for orders placed in visit  on 08/31/21    MR-LUMBAR SPINE-W/O    Impression  Mild multilevel degenerative disc disease and facet degeneration. No significant central canal or neural foraminal narrowing.      Results for orders placed in visit on 08/31/21    MR-THORACIC SPINE-W/O    Impression  1.  Mild degenerative disc disease. No significant central canal or neural foraminal narrowing.    2.  Minimal chronic superior endplate compression fracture of T10. No significant posterior retropulsion.    Results for orders placed in visit on 08/31/21    MR-LUMBAR SPINE-W/O    Impression  Mild multilevel degenerative disc disease and facet degeneration. No significant central canal or neural foraminal narrowing.                                                                       Results for orders placed during the hospital encounter of 07/20/12    CT-ABDOMEN-PELVIS W/ IV Contrast (R/O acute, uncomplicated appendicitis or diverticulitis, bowel ischemia) FOR HIGH BMI PATIENTS    Addendum 7/20/2012  3:22 PM  Gallbladder is not identified with certainty and may be markedly contracted.    Impression  1. Nondistended segments of the appendix as evidence against acute appendicitis.  2. No evidence of bowel obstruction or free fluid.  3. Low-attenuation liver consistent with fatty change and splenomegaly.  No ascites.  4. Bilateral renal scarring.  No hydronephrosis.  5. 2.6-cm right ovarian cyst.  6. Right adnexal clip.  Possible left clip migration into the anterior abdominal cavity.                     Results for orders placed during the hospital encounter of 08/11/21    DX-CERVICAL SPINE-4+ VIEWS    Impression  Degenerative changes of the cervical spine without acute osseous abnormality.              Results for orders placed in visit on 11/10/21    DX-HAND 3+ RIGHT            Results for orders placed during the hospital encounter of 10/27/13    DX-LUMBAR SPINE-2 OR 3 VIEWS    Impression  Negative lumbar spine series.        INTERPRETING LOCATION:  [address] [facility], [address]   Results for orders placed during the hospital encounter of 21    DX-LUMBAR SPINE-4+ VIEWS    Impression  1.  Grossly stable multilevel degenerative changes of the lumbar spine.  2.  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 This can be a pain generator.              Results for orders placed during the hospital encounter of 10/27/13    DX-THORACIC SPINE-2 VIEWS    Impression  1. No acute appearing compression fracture.    2. Minimal potential or old compression of the anterior aspect of the superior endplate of T10.    3. Thoracolumbar junction scoliosis convex left.            INTERPRETING LOCATION: [address] [facility], [address]            DIAGNOSIS   Visit Diagnoses     ICD-10-CM   1. Arthritis of left acromioclavicular joint  M19.012   2. Chronic left shoulder pain  M25.512    G89.29   3. Lumbar spondylosis  M47.816   4. Lumbar degenerative disc disease  M51.36   5. Sacroiliac joint dysfunction of both sides  M53.3   6. Cervical spondylosis  M47.812   7. Chronic neck pain  M54.2    G89.29         ASSESSMENT and PLAN:     Meggan Caba Olga  1970 female      Meggan was seen today for follow-up.    Diagnoses and all orders for this visit:    Arthritis of left acromioclavicular joint  Comments:  we decided proceed with left AC joint injection. I provided a home exercise program  Orders:  -     DX-SHOULDER 2+ LEFT; Future  -     Consent for all Surgical, Special Diagnostic or Therapeutic Procedures  -     dexamethasone (DECADRON) injection 4 mg    Chronic left shoulder pain  Comments:  Not controlled.  Proceed with AC joint injection today.  X-ray ordered.  Continue physical therapy and home exercise program.  Orders:  -     DX-SHOULDER 2+ LEFT; Future  -     Consent for all Surgical, Special Diagnostic or Therapeutic Procedures  -     dexamethasone (DECADRON) injection 4 mg    Lumbar spondylosis  Comments:  Stable, continue  home exercise program and physical therapy    Lumbar degenerative disc disease  Comments:  Stable, continue home exercise program and physical therapy    Sacroiliac joint dysfunction of both sides  Comments:  Stable, continue home exercise program and physical therapy    Cervical spondylosis  Comments:  Stable, continue home exercise program and physical therapy    Chronic neck pain        Medications: continue gabapentin       Follow up: 3 months    Thank you for allowing me to participate in the care of this patient. If you have any questions please not hesitate to contact me.             Please note that this dictation was created using voice recognition software. I have made every reasonable attempt to correct obvious errors but there may be errors of grammar and content that I may have overlooked prior to finalization of this note.      Luis Manuel Coronado MD  Interventional Spine and Sports Physiatry  Physical Medicine and Rehabilitation  [facility]

## 2023-01-09 ENCOUNTER — PHYSICAL THERAPY (OUTPATIENT)
Dept: PHYSICAL THERAPY | Facility: REHABILITATION | Age: 53
End: 2023-01-09
Attending: PHYSICAL MEDICINE & REHABILITATION
Payer: MEDICAID

## 2023-01-09 DIAGNOSIS — M47.816 LUMBAR SPONDYLOSIS: ICD-10-CM

## 2023-01-09 PROCEDURE — 97110 THERAPEUTIC EXERCISES: CPT

## 2023-01-09 PROCEDURE — 97112 NEUROMUSCULAR REEDUCATION: CPT

## 2023-01-12 ENCOUNTER — PHYSICAL THERAPY (OUTPATIENT)
Dept: PHYSICAL THERAPY | Facility: REHABILITATION | Age: 53
End: 2023-01-12
Attending: PHYSICAL MEDICINE & REHABILITATION
Payer: MEDICAID

## 2023-01-12 DIAGNOSIS — M51.36 LUMBAR DEGENERATIVE DISC DISEASE: ICD-10-CM

## 2023-01-12 DIAGNOSIS — M47.816 LUMBAR SPONDYLOSIS: ICD-10-CM

## 2023-01-12 PROCEDURE — 97110 THERAPEUTIC EXERCISES: CPT

## 2023-01-12 PROCEDURE — 97140 MANUAL THERAPY 1/> REGIONS: CPT

## 2023-01-12 NOTE — OP THERAPY DAILY TREATMENT
Outpatient Physical Therapy  DAILY TREATMENT     [facility] Outpatient Physical Therapy [place] [address] [facility], [address]  [place] [address]  Phone:  [phone]    Date: 01/12/2023    Patient: Meggan Espinoza  YOB: 1970  MRN: [id number]     Time Calculation    Start time: 0845  Stop time: 0930 Time Calculation (min): 45 minutes         Chief Complaint: back problem   Visit #: 7    SUBJECTIVE:  Exercise helps symptoms, no problems with HEP, able to clean house without aggravating symptoms.  Left shoulder is very painful today/ especially lying on it at night and when lifting overhead or heavy things     OBJECTIVE:  Current objective measures: left shoulder :ERP shoulder abduction flexion left  Painful and weak left empty can           Therapeutic Exercises (CPT 92974):     1. REIL, x 10 every 2 hours    2. prone on elbows, 1-2 min 3 x day    3. posture correction using lumbar roll    4. stepper L5, 10 min    5. clamshell with pink tband, 1 x 10, 3 x 30 sec holds b    6. bridge with pink resistance tband, 2 x 10, 2 x 30 sec hold with arms at 90 degrees flex    7. sit to stand with green  tband, 3 X 10 (1X10 WITH 5LB WEIGHT)    8. lateral walk wieth green tband, x 1 min EACH DIRECTION    9. row and shoulder extension with green tband, 3 x 10    11. shoulder abduction, 1lb weight 3 x 10      Therapeutic Exercise Summary:  Row, shoulder extension and shoulder abduction added to HEP  Access Code: [email or web address]  URL: [email or web address]  Date: 11/29/2022  Prepared by: Taylor Jackson    Exercises  Supine Bridge - 1 x daily - 7 x weekly - 1 sets - 5 reps - 30 hold  Clamshell - 1 x daily - 7 x weekly - 3 sets - 10 reps  Prone Press Up - 6 x daily - 7 x weekly - 1 sets - 10 reps      Therapeutic Treatments and Modalities:     1. Neuromuscular Re-education (CPT 87713), lumbar extension mob prone in neutral 2 x10 with inhalation/exhalation, increase//NW  Time-based treatments/modalities:      ASSESSMENT:    Response to treatment: + empty can left and weak and painful left shoulder abduct indicate possible left supraspinatus tendonopathy.  Added left shoulder strength and scapular/thoracic stability secondary to onset of left shoulder pain.  Patient demonstrating good progress with decreased back symptoms with functional tasks and improved posterior chain strength.  Continue with lumbar stability and added left shoulder strength next session    PLAN/RECOMMENDATIONS:   Plan for treatment: therapy treatment to continue next visit.  Planned interventions for next visit: continue with current treatment.

## 2023-01-13 ENCOUNTER — HOSPITAL ENCOUNTER (OUTPATIENT)
Dept: RADIOLOGY | Facility: MEDICAL CENTER | Age: 53
End: 2023-01-13
Attending: PHYSICAL MEDICINE & REHABILITATION
Payer: MEDICAID

## 2023-01-13 DIAGNOSIS — G89.29 CHRONIC LEFT SHOULDER PAIN: ICD-10-CM

## 2023-01-13 DIAGNOSIS — M19.012 ARTHRITIS OF LEFT ACROMIOCLAVICULAR JOINT: ICD-10-CM

## 2023-01-13 DIAGNOSIS — M25.512 CHRONIC LEFT SHOULDER PAIN: ICD-10-CM

## 2023-01-13 PROCEDURE — 73030 X-RAY EXAM OF SHOULDER: CPT | Mod: LT

## 2023-01-16 ENCOUNTER — PHYSICAL THERAPY (OUTPATIENT)
Dept: PHYSICAL THERAPY | Facility: REHABILITATION | Age: 53
End: 2023-01-16
Attending: PHYSICAL MEDICINE & REHABILITATION
Payer: MEDICAID

## 2023-01-16 DIAGNOSIS — M47.816 LUMBAR SPONDYLOSIS: ICD-10-CM

## 2023-01-16 DIAGNOSIS — M51.36 LUMBAR DEGENERATIVE DISC DISEASE: ICD-10-CM

## 2023-01-16 DIAGNOSIS — G89.29 CHRONIC BILATERAL LOW BACK PAIN WITHOUT SCIATICA: ICD-10-CM

## 2023-01-16 DIAGNOSIS — M54.50 CHRONIC BILATERAL LOW BACK PAIN WITHOUT SCIATICA: ICD-10-CM

## 2023-01-16 PROCEDURE — 97112 NEUROMUSCULAR REEDUCATION: CPT

## 2023-01-16 PROCEDURE — 97110 THERAPEUTIC EXERCISES: CPT

## 2023-01-16 NOTE — OP THERAPY DAILY TREATMENT
Outpatient Physical Therapy  DAILY TREATMENT     [facility] Outpatient Physical Therapy [place] [address] [facility], [address]  [place] [address]  Phone:  [phone]    Date: 01/16/2023    Patient: Meggan Espinoza  YOB: 1970  MRN: [id number]     Time Calculation    Start time: 0930  Stop time: 1020 Time Calculation (min): 50 minutes         Chief Complaint: No chief complaint on file.    Visit #: 8    SUBJECTIVE:  Increased back and Bilateral thigh and calf pain when prolonged  walking the past few days.  Gabapentin helps decrease intensity.  Left shoulder is about the same     OBJECTIVE:  Current objective measures: left shoulder ERP abduction and flexion          Therapeutic Exercises (CPT 12370):     1. REIL, x 10 every 2 hours    2. prone on elbows, 1-2 min 3 x day    3. posture correction using lumbar roll    4. stepper L5, 10 min    5. clamshell with pink tband, 1 x 10, 3 x 30 sec holds b, NT    6. bridge with pink resistance tband, 2 x 10, 2 x 30 sec hold with arms at 90 degrees flex    7. sit to stand with green  tband, 3 X 10 (1X10 WITH 5LB WEIGHT), NT    8. lateral walk with green tband, x 1 min EACH DIRECTION, NT    9. row and shoulder extension with green tband, 3 x 10    11. shoulder abduction, 1lb weight 3 x 10      Therapeutic Exercise Summary:  Row, shoulder extension and shoulder abduction added to HEP  Access Code: [email or web address]  URL: [email or web address]  Date: 11/29/2022  Prepared by: Taylor Jackson    Exercises  Supine Bridge - 1 x daily - 7 x weekly - 1 sets - 5 reps - 30 hold  Clamshell - 1 x daily - 7 x weekly - 3 sets - 10 reps  Prone Press Up - 6 x daily - 7 x weekly - 1 sets - 10 reps      Therapeutic Treatments and Modalities:     1. Neuromuscular Re-education (CPT 54132), lumbar extension mob prone in neutral 2 x10 with inhalation/exhalation, increase//NW  Time-based treatments/modalities:    Physical Therapy Timed Treatment Charges  Neuromusc re-ed, balance, coor, post  minutes (CPT 83200): 10 minutes  Therapeutic exercise minutes (CPT 73851): 30 minutes      ASSESSMENT:   Response to treatment: Focused on left shoulder stability/strength.  Back symptoms have been aggravated the past few days.  Posterior chain strength remains impaired which is contributing to ongoing lumbar issues.     PLAN/RECOMMENDATIONS:   Plan for treatment: therapy treatment to continue next visit.  Planned interventions for next visit: continue with current treatment.

## 2023-01-19 ENCOUNTER — PHYSICAL THERAPY (OUTPATIENT)
Dept: PHYSICAL THERAPY | Facility: REHABILITATION | Age: 53
End: 2023-01-19
Attending: PHYSICAL MEDICINE & REHABILITATION
Payer: MEDICAID

## 2023-01-19 DIAGNOSIS — M47.816 LUMBAR SPONDYLOSIS: ICD-10-CM

## 2023-01-19 DIAGNOSIS — M51.36 LUMBAR DEGENERATIVE DISC DISEASE: ICD-10-CM

## 2023-01-19 PROCEDURE — 97112 NEUROMUSCULAR REEDUCATION: CPT

## 2023-01-19 PROCEDURE — 97110 THERAPEUTIC EXERCISES: CPT

## 2023-01-19 NOTE — OP THERAPY PROGRESS SUMMARY
Outpatient Physical Therapy  PROGRESS SUMMARY NOTE      [facility] [place]  [address], [address]  [place] [address]  Phone:  [phone]    Date of Visit: 01/19/2023    Patient: Meggan Espinoza  YOB: 1970  MRN: [id number]     Referring Provider: Luis Manuel Coronado M.D.  [address]  [address]  [place] [address]   Referring Diagnosis Spondylosis without myelopathy or radiculopathy, lumbar region [M47.816];Other intervertebral disc degeneration, lumbar region [M51.36];Low back pain, unspecified [M54.50];Other chronic pain [G89.29];Spondylosis without myelopathy or radiculopathy, cervical region [M47.812];Cervicalgia [M54.2];Myalgia, unspecified site [M79.10];Trochanteric bursitis, right hip [M70.61];Trochanteric bursitis, left hip [M70.62];Sacrococcygeal disorders, not elsewhere classified [M53.3]     Visit Diagnoses     ICD-10-CM   1. Lumbar spondylosis  M47.816   2. Lumbar degenerative disc disease  M51.36       Rehab Potential: excellent    Progress Report Period: 1/19/2024    Functional Assessment Used          Objective Findings and Assessment:   Patient progression towards goals: Short Term Goals:   Patient able to complete ADLS with >25% decreased symptoms Partially MET  Patient able to complete household tasks with >25% decreased symptoms Partially MET  Patient able to walk >30 min with >25% decreased symptoms Partially MET        Long Term Goals:    RMQ <30% perceived disability Not Met  Patient able to walk > 45 min with >50% decreased symptoms Not MET    Objective findings and assessment details: Current complaints:  Chronic lumbar pain which can radiate into bilateral LE/lateral hips.  Patient continues to be limited to 30 min of walking without support secondary to symptoms.  Left shoulder pain limits ability to reach behind and overhead   [place] symptoms:  anterior/lateral shoulder pain with overhead movement.  Lumbar symptoms:  intermittent  bilateral  radiculopathy lateral thigh to foot  Functional limitations: squat, walking >30 min, bending  Lumbar AROM:  mod limitations flexion increase//worse, min lumbar extension decrease//better with repeated extension in lying  Patient able to maintain neutral spine with static and dynamic LE functional movement in supine and sitting only    Recommend continued PT to meet goals stated below and for further evaluation  and treatment of left shoulder      Goals:   Short Term Goals:   Patient able to walk >30 min with >25% decreased symptoms  Patient able to perform ADLS with left UE with >25-50% decreased symptoms  Short term goal time span:  2-4 weeks      Long Term Goals:    Patient able to walk >1 hour with >25% decreased symptoms  RMQ < 30  Patient able to perform ADLS with >50% decreased symptoms left UE  Long term goal time span:  6-8 weeks    Plan:   Planned therapy interventions:  Neuromuscular Re-education (CPT 54807), Gait Training (CPT 88231) and Therapeutic Exercise (CPT 18629)  Frequency:  1x week  Duration in weeks:  8    Referring provider co-signature:  I have reviewed this plan of care and my co-signature certifies the need for services.     Certification Period: 01/19/2023 to 03/16/23    Physician Signature: ________________________________ Date: ______________

## 2023-01-19 NOTE — OP THERAPY DAILY TREATMENT
Outpatient Physical Therapy  DAILY TREATMENT     [facility] [place] [address] [place] [address], [address]  [place] [address]  Phone:  [phone]    Date: 01/19/2023    Patient: Meggan Espinoza  YOB: 1970  MRN: [id number]     Time Calculation    Start time: 1100  Stop time: 1145 Time Calculation (min): 45 minutes         Chief Complaint: No chief complaint on file.    Visit #: 9    SUBJECTIVE:  Left shoulder pain after completing HEP at home, lumbar region is feeling ok-been consistent with HEP     OBJECTIVE:  Current objective measures: painfree full AROM left shoulder in all cardinal planes          Therapeutic Exercises (CPT 98580):     1. REIL, x 10 every 2 hours    2. prone on elbows, 1-2 min 3 x day    3. posture correction using lumbar roll    4. stepper L5, 10 min    5. clamshell with pink tband, 1 x 10, 3 x 30 sec holds b, NT    6. bridge with pink resistance tband, 2 x 10, 2 x 30 sec hold with arms at 90 degrees flex    7. sit to stand with green  tband, 2 x 10 with 4lb weighted ball///1 x 15 7lb ball    8. lateral walk with green tband, x 1 min EACH DIRECTION, NT    9. row and shoulder extension with green tband, 3 x 10    10. low trap y    11. shoulder abduction, 3 x 10 orange tband    12. horizontal abduction bilateral shoulders, 3 x 10 orange tband    13. lder ER with scapular retraction, orange tband x 10    14. serratus press, 7lb dumbell 2 x 10      Therapeutic Exercise Summary:  Row, shoulder extension and shoulder abduction added to HEP  Access Code: [email or web address]  URL: [email or web address]  Date: 11/29/2022  Prepared by: Taylor Jackson    Exercises  Supine Bridge - 1 x daily - 7 x weekly - 1 sets - 5 reps - 30 hold  Clamshell - 1 x daily - 7 x weekly - 3 sets - 10 reps  Prone Press Up - 6 x daily - 7 x weekly - 1 sets - 10 reps      Therapeutic Treatments and Modalities:     1. Neuromuscular Re-education (CPT 91321), lumbar extension mob prone in neutral 2 x10  with inhalation/exhalation, increase//NW  Time-based treatments/modalities:    Physical Therapy Timed Treatment Charges  Neuromusc re-ed, balance, coor, post minutes (CPT 80089): 10 minutes  Therapeutic exercise minutes (CPT 04481): 30 minutes  ASSESSMENT:   Response to treatment:  Focused on rotator cuff and shoulder stability today.  Patient given orange tband for standing scaption, ER and horizontal abduction.  UPOC completed.      PLAN/RECOMMENDATIONS:   Plan for treatment: therapy treatment to continue next visit.  Planned interventions for next visit: continue with current treatment.

## 2023-02-16 ENCOUNTER — APPOINTMENT (OUTPATIENT)
Dept: RADIOLOGY | Facility: MEDICAL CENTER | Age: 53
End: 2023-02-16
Attending: EMERGENCY MEDICINE
Payer: MEDICAID

## 2023-02-16 ENCOUNTER — HOSPITAL ENCOUNTER (OUTPATIENT)
Facility: MEDICAL CENTER | Age: 53
End: 2023-02-17
Attending: EMERGENCY MEDICINE | Admitting: INTERNAL MEDICINE
Payer: MEDICAID

## 2023-02-16 ENCOUNTER — APPOINTMENT (OUTPATIENT)
Dept: RADIOLOGY | Facility: MEDICAL CENTER | Age: 53
End: 2023-02-16
Payer: MEDICAID

## 2023-02-16 DIAGNOSIS — R07.9 ACUTE CHEST PAIN: ICD-10-CM

## 2023-02-16 PROBLEM — E66.09 CLASS 1 OBESITY DUE TO EXCESS CALORIES WITH SERIOUS COMORBIDITY AND BODY MASS INDEX (BMI) OF 30.0 TO 30.9 IN ADULT: Chronic | Status: ACTIVE | Noted: 2023-02-16

## 2023-02-16 LAB
ALBUMIN SERPL BCP-MCNC: 4.5 G/DL (ref 3.2–4.9)
ALBUMIN/GLOB SERPL: 1.7 G/DL
ALP SERPL-CCNC: 91 U/L (ref 30–99)
ALT SERPL-CCNC: 20 U/L (ref 2–50)
ANION GAP SERPL CALC-SCNC: 15 MMOL/L (ref 7–16)
AST SERPL-CCNC: 17 U/L (ref 12–45)
BASOPHILS # BLD AUTO: 1 % (ref 0–1.8)
BASOPHILS # BLD: 0.08 K/UL (ref 0–0.12)
BILIRUB SERPL-MCNC: 0.6 MG/DL (ref 0.1–1.5)
BUN SERPL-MCNC: 24 MG/DL (ref 8–22)
CALCIUM ALBUM COR SERPL-MCNC: 8.9 MG/DL (ref 8.5–10.5)
CALCIUM SERPL-MCNC: 9.3 MG/DL (ref 8.5–10.5)
CHLORIDE SERPL-SCNC: 100 MMOL/L (ref 96–112)
CO2 SERPL-SCNC: 23 MMOL/L (ref 20–33)
CREAT SERPL-MCNC: 1.06 MG/DL (ref 0.5–1.4)
EKG IMPRESSION: NORMAL
EOSINOPHIL # BLD AUTO: 0.17 K/UL (ref 0–0.51)
EOSINOPHIL NFR BLD: 2.1 % (ref 0–6.9)
ERYTHROCYTE [DISTWIDTH] IN BLOOD BY AUTOMATED COUNT: 48.1 FL (ref 35.9–50)
GFR SERPLBLD CREATININE-BSD FMLA CKD-EPI: 63 ML/MIN/1.73 M 2
GLOBULIN SER CALC-MCNC: 2.6 G/DL (ref 1.9–3.5)
GLUCOSE BLD STRIP.AUTO-MCNC: 211 MG/DL (ref 65–99)
GLUCOSE SERPL-MCNC: 240 MG/DL (ref 65–99)
HCG SERPL QL: NEGATIVE
HCT VFR BLD AUTO: 36.6 % (ref 37–47)
HGB BLD-MCNC: 11.6 G/DL (ref 12–16)
IMM GRANULOCYTES # BLD AUTO: 0.05 K/UL (ref 0–0.11)
IMM GRANULOCYTES NFR BLD AUTO: 0.6 % (ref 0–0.9)
LYMPHOCYTES # BLD AUTO: 1.85 K/UL (ref 1–4.8)
LYMPHOCYTES NFR BLD: 23.4 % (ref 22–41)
MCH RBC QN AUTO: 26.1 PG (ref 27–33)
MCHC RBC AUTO-ENTMCNC: 31.7 G/DL (ref 33.6–35)
MCV RBC AUTO: 82.2 FL (ref 81.4–97.8)
MONOCYTES # BLD AUTO: 0.39 K/UL (ref 0–0.85)
MONOCYTES NFR BLD AUTO: 4.9 % (ref 0–13.4)
NEUTROPHILS # BLD AUTO: 5.37 K/UL (ref 2–7.15)
NEUTROPHILS NFR BLD: 68 % (ref 44–72)
NRBC # BLD AUTO: 0 K/UL
NRBC BLD-RTO: 0 /100 WBC
PLATELET # BLD AUTO: 254 K/UL (ref 164–446)
PMV BLD AUTO: 10.2 FL (ref 9–12.9)
POTASSIUM SERPL-SCNC: 4.7 MMOL/L (ref 3.6–5.5)
PROT SERPL-MCNC: 7.1 G/DL (ref 6–8.2)
RBC # BLD AUTO: 4.45 M/UL (ref 4.2–5.4)
SODIUM SERPL-SCNC: 138 MMOL/L (ref 135–145)
TROPONIN T SERPL-MCNC: 6 NG/L (ref 6–19)
TROPONIN T SERPL-MCNC: 7 NG/L (ref 6–19)
WBC # BLD AUTO: 7.9 K/UL (ref 4.8–10.8)

## 2023-02-16 PROCEDURE — 84484 ASSAY OF TROPONIN QUANT: CPT

## 2023-02-16 PROCEDURE — 82962 GLUCOSE BLOOD TEST: CPT

## 2023-02-16 PROCEDURE — 99285 EMERGENCY DEPT VISIT HI MDM: CPT

## 2023-02-16 PROCEDURE — 85025 COMPLETE CBC W/AUTO DIFF WBC: CPT

## 2023-02-16 PROCEDURE — 93005 ELECTROCARDIOGRAM TRACING: CPT | Performed by: EMERGENCY MEDICINE

## 2023-02-16 PROCEDURE — 36415 COLL VENOUS BLD VENIPUNCTURE: CPT

## 2023-02-16 PROCEDURE — 99223 1ST HOSP IP/OBS HIGH 75: CPT | Performed by: INTERNAL MEDICINE

## 2023-02-16 PROCEDURE — 84703 CHORIONIC GONADOTROPIN ASSAY: CPT

## 2023-02-16 PROCEDURE — 93005 ELECTROCARDIOGRAM TRACING: CPT

## 2023-02-16 PROCEDURE — 700102 HCHG RX REV CODE 250 W/ 637 OVERRIDE(OP): Performed by: INTERNAL MEDICINE

## 2023-02-16 PROCEDURE — G0378 HOSPITAL OBSERVATION PER HR: HCPCS

## 2023-02-16 PROCEDURE — 700101 HCHG RX REV CODE 250: Performed by: INTERNAL MEDICINE

## 2023-02-16 PROCEDURE — 700102 HCHG RX REV CODE 250 W/ 637 OVERRIDE(OP): Performed by: EMERGENCY MEDICINE

## 2023-02-16 PROCEDURE — A9270 NON-COVERED ITEM OR SERVICE: HCPCS | Performed by: EMERGENCY MEDICINE

## 2023-02-16 PROCEDURE — 80053 COMPREHEN METABOLIC PANEL: CPT

## 2023-02-16 PROCEDURE — 71045 X-RAY EXAM CHEST 1 VIEW: CPT

## 2023-02-16 PROCEDURE — 96372 THER/PROPH/DIAG INJ SC/IM: CPT

## 2023-02-16 PROCEDURE — A9270 NON-COVERED ITEM OR SERVICE: HCPCS | Performed by: INTERNAL MEDICINE

## 2023-02-16 RX ORDER — TOPIRAMATE 200 MG/1
200 CAPSULE, EXTENDED RELEASE ORAL DAILY
COMMUNITY
End: 2023-02-22 | Stop reason: SDUPTHER

## 2023-02-16 RX ORDER — FAMOTIDINE 40 MG/1
40 TABLET, FILM COATED ORAL DAILY
COMMUNITY
Start: 2023-01-16

## 2023-02-16 RX ORDER — ACETAMINOPHEN 325 MG/1
650 TABLET ORAL EVERY 6 HOURS PRN
Status: DISCONTINUED | OUTPATIENT
Start: 2023-02-16 | End: 2023-02-17 | Stop reason: HOSPADM

## 2023-02-16 RX ORDER — PROMETHAZINE HYDROCHLORIDE 25 MG/1
12.5-25 SUPPOSITORY RECTAL EVERY 4 HOURS PRN
Status: DISCONTINUED | OUTPATIENT
Start: 2023-02-16 | End: 2023-02-17 | Stop reason: HOSPADM

## 2023-02-16 RX ORDER — ONDANSETRON 2 MG/ML
4 INJECTION INTRAMUSCULAR; INTRAVENOUS EVERY 4 HOURS PRN
Status: DISCONTINUED | OUTPATIENT
Start: 2023-02-16 | End: 2023-02-17 | Stop reason: HOSPADM

## 2023-02-16 RX ORDER — LISINOPRIL 20 MG/1
20 TABLET ORAL DAILY
Status: DISCONTINUED | OUTPATIENT
Start: 2023-02-17 | End: 2023-02-17 | Stop reason: HOSPADM

## 2023-02-16 RX ORDER — PROMETHAZINE HYDROCHLORIDE 25 MG/1
12.5-25 TABLET ORAL EVERY 4 HOURS PRN
Status: DISCONTINUED | OUTPATIENT
Start: 2023-02-16 | End: 2023-02-17 | Stop reason: HOSPADM

## 2023-02-16 RX ORDER — AMOXICILLIN 250 MG
2 CAPSULE ORAL 2 TIMES DAILY
Status: DISCONTINUED | OUTPATIENT
Start: 2023-02-17 | End: 2023-02-17 | Stop reason: HOSPADM

## 2023-02-16 RX ORDER — TOPIRAMATE 100 MG/1
100 TABLET, FILM COATED ORAL 2 TIMES DAILY
Status: DISCONTINUED | OUTPATIENT
Start: 2023-02-16 | End: 2023-02-17 | Stop reason: HOSPADM

## 2023-02-16 RX ORDER — BISACODYL 10 MG
10 SUPPOSITORY, RECTAL RECTAL
Status: DISCONTINUED | OUTPATIENT
Start: 2023-02-16 | End: 2023-02-17 | Stop reason: HOSPADM

## 2023-02-16 RX ORDER — TRAZODONE HYDROCHLORIDE 50 MG/1
50 TABLET ORAL NIGHTLY
Status: DISCONTINUED | OUTPATIENT
Start: 2023-02-16 | End: 2023-02-17 | Stop reason: HOSPADM

## 2023-02-16 RX ORDER — PROCHLORPERAZINE EDISYLATE 5 MG/ML
5-10 INJECTION INTRAMUSCULAR; INTRAVENOUS EVERY 4 HOURS PRN
Status: DISCONTINUED | OUTPATIENT
Start: 2023-02-16 | End: 2023-02-17 | Stop reason: HOSPADM

## 2023-02-16 RX ORDER — POLYETHYLENE GLYCOL 3350 17 G/17G
1 POWDER, FOR SOLUTION ORAL
Status: DISCONTINUED | OUTPATIENT
Start: 2023-02-16 | End: 2023-02-17 | Stop reason: HOSPADM

## 2023-02-16 RX ORDER — ASPIRIN 325 MG
325 TABLET ORAL DAILY
Status: DISCONTINUED | OUTPATIENT
Start: 2023-02-17 | End: 2023-02-17 | Stop reason: HOSPADM

## 2023-02-16 RX ORDER — ASPIRIN 325 MG
325 TABLET ORAL ONCE
Status: COMPLETED | OUTPATIENT
Start: 2023-02-16 | End: 2023-02-16

## 2023-02-16 RX ORDER — ATORVASTATIN CALCIUM 40 MG/1
40 TABLET, FILM COATED ORAL
COMMUNITY
Start: 2022-12-26

## 2023-02-16 RX ORDER — TOPIRAMATE 200 MG/1
220 CAPSULE, EXTENDED RELEASE ORAL EVERY EVENING
Status: DISCONTINUED | OUTPATIENT
Start: 2023-02-16 | End: 2023-02-16

## 2023-02-16 RX ORDER — ATORVASTATIN CALCIUM 40 MG/1
40 TABLET, FILM COATED ORAL
Status: DISCONTINUED | OUTPATIENT
Start: 2023-02-16 | End: 2023-02-16

## 2023-02-16 RX ORDER — EMPAGLIFLOZIN 10 MG/1
10 TABLET, FILM COATED ORAL DAILY
COMMUNITY

## 2023-02-16 RX ORDER — ONDANSETRON 4 MG/1
4 TABLET, ORALLY DISINTEGRATING ORAL EVERY 4 HOURS PRN
Status: DISCONTINUED | OUTPATIENT
Start: 2023-02-16 | End: 2023-02-17 | Stop reason: HOSPADM

## 2023-02-16 RX ORDER — ATORVASTATIN CALCIUM 20 MG/1
40 TABLET, FILM COATED ORAL EVERY EVENING
Status: DISCONTINUED | OUTPATIENT
Start: 2023-02-16 | End: 2023-02-17 | Stop reason: HOSPADM

## 2023-02-16 RX ORDER — NITROGLYCERIN 0.4 MG/1
0.4 TABLET SUBLINGUAL
Status: COMPLETED | OUTPATIENT
Start: 2023-02-16 | End: 2023-02-16

## 2023-02-16 RX ORDER — GABAPENTIN 400 MG/1
800 CAPSULE ORAL EVERY 8 HOURS
Status: DISCONTINUED | OUTPATIENT
Start: 2023-02-16 | End: 2023-02-17 | Stop reason: HOSPADM

## 2023-02-16 RX ADMIN — ASPIRIN 325 MG: 325 TABLET, FILM COATED ORAL at 17:15

## 2023-02-16 RX ADMIN — NITROGLYCERIN 0.4 MG: 0.4 TABLET, ORALLY DISINTEGRATING SUBLINGUAL at 17:07

## 2023-02-16 RX ADMIN — INSULIN GLARGINE-YFGN 26 UNITS: 100 INJECTION, SOLUTION SUBCUTANEOUS at 21:55

## 2023-02-16 RX ADMIN — ATORVASTATIN CALCIUM 40 MG: 20 TABLET, FILM COATED ORAL at 22:15

## 2023-02-16 RX ADMIN — GABAPENTIN 800 MG: 400 CAPSULE ORAL at 22:16

## 2023-02-16 RX ADMIN — TOPIRAMATE 100 MG: 100 TABLET, FILM COATED ORAL at 22:16

## 2023-02-16 RX ADMIN — TRAZODONE HYDROCHLORIDE 50 MG: 50 TABLET ORAL at 22:15

## 2023-02-16 RX ADMIN — INSULIN HUMAN 2 UNITS: 100 INJECTION, SOLUTION PARENTERAL at 21:54

## 2023-02-16 ASSESSMENT — HEART SCORE
HEART SCORE: 4
AGE: 45-64
HISTORY: MODERATELY SUSPICIOUS
TROPONIN: LESS THAN OR EQUAL TO NORMAL LIMIT
ECG: NORMAL
RISK FACTORS: >2 RISK FACTORS OR HX OF ATHEROSCLEROTIC DISEASE

## 2023-02-16 ASSESSMENT — COGNITIVE AND FUNCTIONAL STATUS - GENERAL
SUGGESTED CMS G CODE MODIFIER DAILY ACTIVITY: CH
MOBILITY SCORE: 24
DAILY ACTIVITIY SCORE: 24
SUGGESTED CMS G CODE MODIFIER MOBILITY: CH

## 2023-02-16 ASSESSMENT — LIFESTYLE VARIABLES
HAVE YOU EVER FELT YOU SHOULD CUT DOWN ON YOUR DRINKING: NO
HOW MANY TIMES IN THE PAST YEAR HAVE YOU HAD 5 OR MORE DRINKS IN A DAY: 0
EVER FELT BAD OR GUILTY ABOUT YOUR DRINKING: NO
ON A TYPICAL DAY WHEN YOU DRINK ALCOHOL HOW MANY DRINKS DO YOU HAVE: 0
CONSUMPTION TOTAL: NEGATIVE
DOES PATIENT WANT TO STOP DRINKING: NO
AVERAGE NUMBER OF DAYS PER WEEK YOU HAVE A DRINK CONTAINING ALCOHOL: 0
ALCOHOL_USE: NO
EVER HAD A DRINK FIRST THING IN THE MORNING TO STEADY YOUR NERVES TO GET RID OF A HANGOVER: NO
HAVE PEOPLE ANNOYED YOU BY CRITICIZING YOUR DRINKING: NO
TOTAL SCORE: 0

## 2023-02-16 ASSESSMENT — ENCOUNTER SYMPTOMS
BLURRED VISION: 0
INSOMNIA: 0
HEADACHES: 0
EYE PAIN: 0
SHORTNESS OF BREATH: 0
COUGH: 0
DIZZINESS: 0
NAUSEA: 0
NERVOUS/ANXIOUS: 0
CHILLS: 0
ABDOMINAL PAIN: 0
BACK PAIN: 0
PALPITATIONS: 0
FEVER: 0
VOMITING: 0

## 2023-02-16 ASSESSMENT — FIBROSIS 4 INDEX
FIB4 SCORE: 0.78
FIB4 SCORE: 0.63

## 2023-02-16 ASSESSMENT — PATIENT HEALTH QUESTIONNAIRE - PHQ9
SUM OF ALL RESPONSES TO PHQ9 QUESTIONS 1 AND 2: 0
1. LITTLE INTEREST OR PLEASURE IN DOING THINGS: NOT AT ALL
2. FEELING DOWN, DEPRESSED, IRRITABLE, OR HOPELESS: NOT AT ALL

## 2023-02-16 NOTE — ED TRIAGE NOTES
Chief Complaint   Patient presents with    Chest Pain     Left sided radiating down arm     Pt back for EKG. Protocol initiated.  /83   Pulse (!) 102   Temp 36.9 °C (98.5 °F) (Temporal)   Resp 17   Wt 69.5 kg (153 lb 3.5 oz)   SpO2 97%   Pt informed of wait times. Educated on triage process.  Asked to return to triage RN for any new or worsening of symptoms. Thanked for patience.

## 2023-02-17 ENCOUNTER — APPOINTMENT (OUTPATIENT)
Dept: RADIOLOGY | Facility: MEDICAL CENTER | Age: 53
End: 2023-02-17
Attending: INTERNAL MEDICINE
Payer: MEDICAID

## 2023-02-17 ENCOUNTER — APPOINTMENT (OUTPATIENT)
Dept: CARDIOLOGY | Facility: MEDICAL CENTER | Age: 53
End: 2023-02-17
Attending: STUDENT IN AN ORGANIZED HEALTH CARE EDUCATION/TRAINING PROGRAM
Payer: MEDICAID

## 2023-02-17 VITALS
WEIGHT: 151.68 LBS | BODY MASS INDEX: 30.63 KG/M2 | HEART RATE: 96 BPM | OXYGEN SATURATION: 95 % | SYSTOLIC BLOOD PRESSURE: 126 MMHG | TEMPERATURE: 97.5 F | DIASTOLIC BLOOD PRESSURE: 81 MMHG | RESPIRATION RATE: 17 BRPM

## 2023-02-17 LAB
ANION GAP SERPL CALC-SCNC: 11 MMOL/L (ref 7–16)
BUN SERPL-MCNC: 22 MG/DL (ref 8–22)
CALCIUM SERPL-MCNC: 9.1 MG/DL (ref 8.5–10.5)
CHLORIDE SERPL-SCNC: 103 MMOL/L (ref 96–112)
CO2 SERPL-SCNC: 26 MMOL/L (ref 20–33)
CREAT SERPL-MCNC: 1.06 MG/DL (ref 0.5–1.4)
ERYTHROCYTE [DISTWIDTH] IN BLOOD BY AUTOMATED COUNT: 47.9 FL (ref 35.9–50)
GFR SERPLBLD CREATININE-BSD FMLA CKD-EPI: 63 ML/MIN/1.73 M 2
GLUCOSE BLD STRIP.AUTO-MCNC: 161 MG/DL (ref 65–99)
GLUCOSE BLD STRIP.AUTO-MCNC: 190 MG/DL (ref 65–99)
GLUCOSE BLD STRIP.AUTO-MCNC: 226 MG/DL (ref 65–99)
GLUCOSE SERPL-MCNC: 280 MG/DL (ref 65–99)
HCT VFR BLD AUTO: 33.1 % (ref 37–47)
HGB BLD-MCNC: 10.4 G/DL (ref 12–16)
LV EJECT FRACT  99904: 60
LV EJECT FRACT MOD 2C 99903: 58.82
LV EJECT FRACT MOD 4C 99902: 60.17
LV EJECT FRACT MOD BP 99901: 59.44
MAGNESIUM SERPL-MCNC: 1.8 MG/DL (ref 1.5–2.5)
MCH RBC QN AUTO: 25.6 PG (ref 27–33)
MCHC RBC AUTO-ENTMCNC: 31.4 G/DL (ref 33.6–35)
MCV RBC AUTO: 81.5 FL (ref 81.4–97.8)
PLATELET # BLD AUTO: 200 K/UL (ref 164–446)
PMV BLD AUTO: 10.6 FL (ref 9–12.9)
POTASSIUM SERPL-SCNC: 4.5 MMOL/L (ref 3.6–5.5)
RBC # BLD AUTO: 4.06 M/UL (ref 4.2–5.4)
SODIUM SERPL-SCNC: 140 MMOL/L (ref 135–145)
TROPONIN T SERPL-MCNC: 14 NG/L (ref 6–19)
WBC # BLD AUTO: 6.2 K/UL (ref 4.8–10.8)

## 2023-02-17 PROCEDURE — 99239 HOSP IP/OBS DSCHRG MGMT >30: CPT | Performed by: STUDENT IN AN ORGANIZED HEALTH CARE EDUCATION/TRAINING PROGRAM

## 2023-02-17 PROCEDURE — G0378 HOSPITAL OBSERVATION PER HR: HCPCS

## 2023-02-17 PROCEDURE — 82962 GLUCOSE BLOOD TEST: CPT | Mod: 91

## 2023-02-17 PROCEDURE — 93306 TTE W/DOPPLER COMPLETE: CPT | Mod: 26 | Performed by: INTERNAL MEDICINE

## 2023-02-17 PROCEDURE — 80048 BASIC METABOLIC PNL TOTAL CA: CPT

## 2023-02-17 PROCEDURE — 83735 ASSAY OF MAGNESIUM: CPT

## 2023-02-17 PROCEDURE — 93306 TTE W/DOPPLER COMPLETE: CPT

## 2023-02-17 PROCEDURE — 36415 COLL VENOUS BLD VENIPUNCTURE: CPT

## 2023-02-17 PROCEDURE — 84484 ASSAY OF TROPONIN QUANT: CPT

## 2023-02-17 PROCEDURE — 85027 COMPLETE CBC AUTOMATED: CPT

## 2023-02-17 PROCEDURE — 78452 HT MUSCLE IMAGE SPECT MULT: CPT

## 2023-02-17 PROCEDURE — A9270 NON-COVERED ITEM OR SERVICE: HCPCS | Performed by: INTERNAL MEDICINE

## 2023-02-17 PROCEDURE — 700102 HCHG RX REV CODE 250 W/ 637 OVERRIDE(OP): Performed by: INTERNAL MEDICINE

## 2023-02-17 RX ADMIN — GABAPENTIN 800 MG: 400 CAPSULE ORAL at 05:29

## 2023-02-17 RX ADMIN — GABAPENTIN 800 MG: 400 CAPSULE ORAL at 13:22

## 2023-02-17 RX ADMIN — LISINOPRIL 20 MG: 20 TABLET ORAL at 05:28

## 2023-02-17 RX ADMIN — ASPIRIN 325 MG: 325 TABLET ORAL at 05:28

## 2023-02-17 NOTE — ED PROVIDER NOTES
ED Provider Note    CHIEF COMPLAINT  Chief Complaint   Patient presents with    Chest Pain     Left sided radiating down arm       HPI/ROS  Meggan Gertrudis Espinoza is a 53 y.o. female who presents with chest pain.  The patient states has had some intermittent chest pain over the last couple of days.  She attributed to a bad shoulder as the pain was in the left anterior shoulder region.  However today she noticed with exertion she started having pain going down her left arm she also had associated shortness of breath.  Therefore she presents for evaluation.  She has not had any nausea or vomiting.  She has not had any diaphoresis.  She states the pain seems to be improved with rest.  Currently the pain is about 5-6 out of 10 in intensity.  She has no known history of cardiac disease but she does have significant risk factors including diabetes, dyslipidemia, and hypertension.  She has not had any pain or swelling to her lower extremities.    PAST MEDICAL HISTORY   has a past medical history of Arthritis, Bowel habit changes, Diabetes, Dyslipidemia, GERD (gastroesophageal reflux disease), Heart burn, High cholesterol, HTN (hypertension), Hypertension, Lower back pain, and Vitamin deficiency.    SURGICAL HISTORY   has a past surgical history that includes tonsillectomy; tubal coagulation laparoscopic bilateral; cholecystectomy; shldr arthroscop,part acromioplas (Right, 9/22/2020); arthroscopy shoulder surgical biceps tenodes* (Right, 9/22/2020); other orthopedic surgery (2017); neuroplasty & or transpos median nrv carpal sharan (Left, 2/15/2022); and injection,sacroiliac joint (Bilateral, 10/11/2022).    FAMILY HISTORY  Family History   Problem Relation Age of Onset    Hypertension Mother     Diabetes Mother        SOCIAL HISTORY  Social History     Tobacco Use    Smoking status: Never    Smokeless tobacco: Never   Vaping Use    Vaping Use: Never used   Substance and Sexual Activity    Alcohol use: No    Drug use: No     Sexual activity: Not on file       CURRENT MEDICATIONS  Home Medications       Reviewed by Lu Burnett R.N. (Registered Nurse) on 02/16/23 at 1510  Med List Status: Partial     Medication Last Dose Status   atorvastatin (LIPITOR) 20 MG Tab  Active   Blood Glucose Monitoring Suppl (TRUE METRIX AIR GLUCOSE METER) Device  Active   colestipol (COLESTID) 1 GM Tab  Active   [employer] Lancets Ultra-Thin 30G Misc  Active   famotidine (PEPCID) 20 MG Tab  Active   gabapentin (NEURONTIN) 800 MG tablet  Active   Galcanezumab-gnlm (EMGALITY) 120 MG/ML Solution Auto-injector  Active   INSULIN GLARGINE 100 UNIT/ML injection PEN  Active   LEVEMIR FLEXTOUCH 100 UNIT/ML injection PEN  Active   lisinopril (PRINIVIL) 20 MG Tab  Active   metformin (GLUCOPHAGE) 1000 MG tablet  Active   Misc. Devices MISC  Active   Misc. Devices Misc  Active   Misc. Devices Misc  Active   pantoprazole (PROTONIX) 40 MG Tablet Delayed Response  Active   Topiramate ER (TROKENDI XR) 200 MG CAPSULE SR 24 HR  Active   traZODone (DESYREL) 50 MG Tab  Active   TRUE METRIX BLOOD GLUCOSE TEST strip  Active   Ubrogepant (UBRELVY) 100 MG Tab  Active                    ALLERGIES  Allergies   Allergen Reactions    Nkda [No Known Drug Allergy]        PHYSICAL EXAM  VITAL SIGNS: /70   Pulse 82   Temp 36.9 °C (98.5 °F) (Temporal)   Resp 15   Wt 69.5 kg (153 lb 3.5 oz)   LMP 07/02/2015   SpO2 94%   BMI 30.95 kg/m²    In general the patient appears uncomfortable    HEENT unremarkable    Pulmonary chest clear to auscultation bilaterally    Cardiovascular S1-S2 with a regular rate and rhythm    GI abdomen is soft    Skin no pallor    Extremities no edema    Neurologic examination is intact    DIAGNOSTIC STUDIES  Results for orders placed or performed during the hospital encounter of 02/16/23   CBC with Differential   Result Value Ref Range    WBC 7.9 4.8 - 10.8 K/uL    RBC 4.45 4.20 - 5.40 M/uL    Hemoglobin 11.6 (L) 12.0 - 16.0 g/dL    Hematocrit 36.6 (L) 37.0  - 47.0 %    MCV 82.2 81.4 - 97.8 fL    MCH 26.1 (L) 27.0 - 33.0 pg    MCHC 31.7 (L) 33.6 - 35.0 g/dL    RDW 48.1 35.9 - 50.0 fL    Platelet Count 254 164 - 446 K/uL    MPV 10.2 9.0 - 12.9 fL    Neutrophils-Polys 68.00 44.00 - 72.00 %    Lymphocytes 23.40 22.00 - 41.00 %    Monocytes 4.90 0.00 - 13.40 %    Eosinophils 2.10 0.00 - 6.90 %    Basophils 1.00 0.00 - 1.80 %    Immature Granulocytes 0.60 0.00 - 0.90 %    Nucleated RBC 0.00 /100 WBC    Neutrophils (Absolute) 5.37 2.00 - 7.15 K/uL    Lymphs (Absolute) 1.85 1.00 - 4.80 K/uL    Monos (Absolute) 0.39 0.00 - 0.85 K/uL    Eos (Absolute) 0.17 0.00 - 0.51 K/uL    Baso (Absolute) 0.08 0.00 - 0.12 K/uL    Immature Granulocytes (abs) 0.05 0.00 - 0.11 K/uL    NRBC (Absolute) 0.00 K/uL   Complete Metabolic Panel (CMP)   Result Value Ref Range    Sodium 138 135 - 145 mmol/L    Potassium 4.7 3.6 - 5.5 mmol/L    Chloride 100 96 - 112 mmol/L    Co2 23 20 - 33 mmol/L    Anion Gap 15.0 7.0 - 16.0    Glucose 240 (H) 65 - 99 mg/dL    Bun 24 (H) 8 - 22 mg/dL    Creatinine 1.06 0.50 - 1.40 mg/dL    Calcium 9.3 8.5 - 10.5 mg/dL    AST(SGOT) 17 12 - 45 U/L    ALT(SGPT) 20 2 - 50 U/L    Alkaline Phosphatase 91 30 - 99 U/L    Total Bilirubin 0.6 0.1 - 1.5 mg/dL    Albumin 4.5 3.2 - 4.9 g/dL    Total Protein 7.1 6.0 - 8.2 g/dL    Globulin 2.6 1.9 - 3.5 g/dL    A-G Ratio 1.7 g/dL   Troponins NOW   Result Value Ref Range    Troponin T 7 6 - 19 ng/L   Troponins in two (2) hours   Result Value Ref Range    Troponin T 6 6 - 19 ng/L   HCG Qual Serum   Result Value Ref Range    Beta-Hcg Qualitative Serum Negative Negative   CORRECTED CALCIUM   Result Value Ref Range    Correct Calcium 8.9 8.5 - 10.5 mg/dL   ESTIMATED GFR   Result Value Ref Range    GFR (CKD-EPI) 63 >60 mL/min/1.73 m 2   EKG   Result Value Ref Range    Report       [facility] Emergency Dept.    Test Date:  2023-02-16  Pt Name:    ROGERS POWERS             Department: ER  MRN:        [id number]                       Room:  Gender:     Female                       Technician: [id number]  :        1970                   Requested By:ER TRIAGE PROTOCOL  Order #:    [id number]                    Reading MD: SUDHAKAR VELEZ MD    Measurements  Intervals                                Axis  Rate:       87                           P:          40  VT:         155                          QRS:        38  QRSD:       91                           T:          34  QT:         378  QTc:        455    Interpretive Statements  Twelve-lead EKG shows a normal sinus rhythm with a ventricular to 87, normal  QRS, normal intervals, no ST segment elevation or depression, T waves  inverted  in lead V1 and flattened V2  Electronically Signed On 2023 16:59:35 PST by SUDHAKAR VELEZ MD       EKG  I have independently interpreted this EKG  Please see my interpretation above    RADIOLOGY  I have independently interpreted the diagnostic imaging associated with this visit and am waiting the final reading from the radiologist.   My preliminary interpretation is a follows: Chest x-ray shows no evidence of a focal process  Radiologist interpretation:   DX-CHEST-PORTABLE (1 VIEW)   Final Result         1. No acute cardiopulmonary abnormalities are identified.          HEART Score: 4    COURSE & MEDICAL DECISION MAKING    This a 53-year-old female who presents the emergency department with a concerning story for unstable angina.  She has radicular pain down her left arm.  She also has a heart score of 4 and she will require admission for further cardiac rule out.  The patient's EKG does not show any ischemic change and her initial troponin is negative which is comforting.  She did respond to nitroglycerin and she also received aspirin.  Patient's chest x-ray does not show any evidence of a pulmonary source.  The patient's abdomen is benign and therefore referred pain would be unlikely.  She is resting comfortably at the time of admission her  blood pressure has come down to 100 systolic after the nitroglycerin.  She will be admitted to the hospitalist in stable condition for further cardiac rule out.  FINAL DIAGNOSIS  Chest pain    Disposition  Patient will be admitted in stable condition       Electronically signed by: Jeremiah Miller M.D., 2/16/2023 4:57 PM

## 2023-02-17 NOTE — ED NOTES
Med Rec complete per patient  No oral antibiotics in the last 30 days per patient  Allergies reviewed  Preferred Pharmacy: [facility] on [address]  Patient has been out of [place] due to insurance issues

## 2023-02-17 NOTE — H&P
Hospital Medicine History & Physical Note    Date of Service  2/16/2023    Primary Care Physician  Faustina Painting M.D.    Consultants  none    Code Status  Full Code    Chief Complaint  Chief Complaint   Patient presents with    Chest Pain     Left sided radiating down arm       History of Presenting Illness  Meggan Espinoza is a 53 y.o. female who presented 2/16/2023 with chest pain.  Onset 7AM, left parasternal chest pressure, left shoulder and arm radiation.  Patient stated she did have previous events similar, but did not get checked out.  This event was too painful.  Risk factors include overweight, T2DM uncontrolled, HTN, HLD. Denied tobacco, alcohol or illicit drug use.  Patient's  at bedside.  Both agreed for admission and evaluation with stress test.    Spoke to EDP about patient's case, patient was given nitroglycerin which helped her chest pain.  Troponins so far have been negative.    I discussed the plan of care with patient, family, bedside RN, charge RN, , and pharmacy.    Review of Systems  Review of Systems   Constitutional:  Negative for chills and fever.   HENT:  Negative for congestion and hearing loss.    Eyes:  Negative for blurred vision and pain.   Respiratory:  Negative for cough and shortness of breath.    Cardiovascular:  Positive for chest pain (Mild). Negative for palpitations.   Gastrointestinal:  Negative for abdominal pain, nausea and vomiting.   Genitourinary:  Negative for dysuria and hematuria.   Musculoskeletal:  Negative for back pain and joint pain.   Skin:  Negative for itching and rash.   Neurological:  Negative for dizziness and headaches.   Psychiatric/Behavioral:  The patient is not nervous/anxious and does not have insomnia.    All other systems reviewed and are negative.    Past Medical History   has a past medical history of Arthritis, Bowel habit changes, Diabetes, Dyslipidemia, GERD (gastroesophageal reflux disease), Heart burn, High  cholesterol, HTN (hypertension), Hypertension, Lower back pain, and Vitamin deficiency.    Surgical History   has a past surgical history that includes tonsillectomy; tubal coagulation laparoscopic bilateral; cholecystectomy; pr shldr arthroscop,part acromioplas (Right, 2020); pr arthroscopy shoulder surgical biceps tenodes* (Right, 2020); other orthopedic surgery (2017); pr neuroplasty & or transpos median nrv carpal sharan (Left, 2/15/2022); and pr injection,sacroiliac joint (Bilateral, 10/11/2022).     Family History  family history includes Diabetes in her mother; Hypertension in her mother.   Family history reviewed with patient. There is no family history that is pertinent to the chief complaint.     Social History   reports that she has never smoked. She has never used smokeless tobacco. She reports that she does not drink alcohol and does not use drugs.    Allergies  Allergies   Allergen Reactions    Nkda [No Known Drug Allergy]        Medications  Prior to Admission Medications   Prescriptions Last Dose Informant Patient Reported? Taking?   Blood Glucose Monitoring Suppl (TRUE METRIX AIR GLUCOSE METER) Device  Patient No No   Si Each by Does not apply route 2 Times a Day.   [employer] Lancets Ultra-Thin 30G Misc  Patient Yes No   Sig: AS DIRECTED FOUR TIMES DAILY CHECK SUGARS 30 DAYS   Galcanezumab-gnlm (EMGALITY) 120 MG/ML Solution Auto-injector   No No   Sig: INJECT 1 PEN SUBCUTANEOUSLY AS INSTRUCTED EVERY 4 WEEKS   INSULIN GLARGINE 100 UNIT/ML injection PEN  Patient Yes No   Sig: Inject 24 Units under the skin every evening.   LEVEMIR FLEXTOUCH 100 UNIT/ML injection PEN   Yes No   Misc. Devices MISC  Patient No No   Sig: Needles for solostar lantus use as directed   Misc. Devices Misc  Patient No No   Si. Glucometer #1   2. Test strips and lancets to test blood sugars qid     #120   Misc. Devices Misc  Patient No No   Sig: DM lancets Dispense brand covered by patients insurance Testing frequency:  Twice dailyDx DM w/o complication. E11.9   TRUE METRIX BLOOD GLUCOSE TEST strip   Yes No   Sig: USE AS DIRECTED 3-4 TIMES A DAY   Topiramate ER (TROKENDI XR) 200 MG CAPSULE SR 24 HR   No No   Sig: Take 1 Capsule 24 hour sustained release by mouth every evening.   Ubrogepant (UBRELVY) 100 MG Tab   No No   Sig: Take 1 Tablet by mouth as needed (1 tab at headache osnet, repeat in 2 hour prn).   atorvastatin (LIPITOR) 20 MG Tab  Patient No No   Sig: TAKE 1 TABLET BY MOUTH EVERY DAY   colestipol (COLESTID) 1 GM Tab   Yes No   Sig: Take 1 Tablet by mouth 2 times a day.   famotidine (PEPCID) 20 MG Tab  Patient Yes No   gabapentin (NEURONTIN) 800 MG tablet  Patient No No   Sig: Take 1 Tab by mouth 3 times a day.   lisinopril (PRINIVIL) 20 MG Tab  Patient No No   Sig: Take 1 Tab by mouth every day.   metformin (GLUCOPHAGE) 1000 MG tablet  Patient Yes No   Sig: Take 1 Tablet by mouth.   pantoprazole (PROTONIX) 40 MG Tablet Delayed Response   Yes No   Sig: Take 1 Tablet by mouth every day.   traZODone (DESYREL) 50 MG Tab  Patient Yes No   Sig: Take 1 Tablet by mouth every evening.      Facility-Administered Medications: None       Physical Exam  Temp:  [36.9 °C (98.5 °F)] 36.9 °C (98.5 °F)  Pulse:  [] 81  Resp:  [13-19] 16  BP: ()/(58-83) 124/80  SpO2:  [92 %-97 %] 92 %  Blood Pressure: 100/62   Temperature: 36.9 °C (98.5 °F)   Pulse: 80   Respiration: 19   Pulse Oximetry: 94 %       Physical Exam  Vitals and nursing note reviewed.   Constitutional:       General: She is not in acute distress.     Appearance: Normal appearance. She is not ill-appearing.   HENT:      Head: Normocephalic and atraumatic.      Right Ear: External ear normal.      Left Ear: External ear normal.      Mouth/Throat:      Mouth: Mucous membranes are dry.      Pharynx: No oropharyngeal exudate.   Eyes:      General: No scleral icterus.     Extraocular Movements: Extraocular movements intact.   Cardiovascular:      Rate and Rhythm: Normal  rate and regular rhythm.      Pulses: Normal pulses.      Heart sounds: Normal heart sounds. No murmur heard.  Pulmonary:      Effort: Pulmonary effort is normal. No respiratory distress.      Breath sounds: Normal breath sounds. No wheezing.   Abdominal:      General: Abdomen is flat. Bowel sounds are normal. There is no distension.      Palpations: Abdomen is soft.      Tenderness: There is no abdominal tenderness.   Musculoskeletal:         General: No swelling or tenderness. Normal range of motion.      Cervical back: Normal range of motion. No rigidity.   Skin:     General: Skin is warm.      Capillary Refill: Capillary refill takes less than 2 seconds.      Coloration: Skin is not jaundiced.      Findings: No erythema.   Neurological:      General: No focal deficit present.      Mental Status: She is alert and oriented to person, place, and time. Mental status is at baseline.      Motor: No weakness.   Psychiatric:         Mood and Affect: Mood normal.         Behavior: Behavior normal.         Thought Content: Thought content normal.         Judgment: Judgment normal.       Laboratory:  Recent Labs     02/16/23  1527   WBC 7.9   RBC 4.45   HEMOGLOBIN 11.6*   HEMATOCRIT 36.6*   MCV 82.2   MCH 26.1*   MCHC 31.7*   RDW 48.1   PLATELETCT 254   MPV 10.2     Recent Labs     02/16/23  1527   SODIUM 138   POTASSIUM 4.7   CHLORIDE 100   CO2 23   GLUCOSE 240*   BUN 24*   CREATININE 1.06   CALCIUM 9.3     Recent Labs     02/16/23  1527   ALTSGPT 20   ASTSGOT 17   ALKPHOSPHAT 91   TBILIRUBIN 0.6   GLUCOSE 240*         No results for input(s): NTPROBNP in the last 72 hours.      Recent Labs     02/16/23  1527 02/16/23  1718   TROPONINT 7 6       Imaging:  DX-CHEST-PORTABLE (1 VIEW)   Final Result         1. No acute cardiopulmonary abnormalities are identified.      NM-CARDIAC STRESS TEST    (Results Pending)       X-Ray:  I have personally reviewed the images and compared with prior images.  EKG:  My impression is:  Patient has some T wave flattening on lead III, V2 which does not appear to be too different from her previous EKG 2/11/2022.  No ST elevations or significant depressions.  Sinus rhythm.  QTc 455 ms.    Assessment/Plan:  Justification for Admission Status  I anticipate this patient is appropriate for observation status at this time because ACS rule out with stress test in the morning    Patient will need a Telemetry bed on MEDICAL service .  The need is secondary to ACS rule out.    * Chest pain- (present on admission)  Assessment & Plan  The ASCVD Risk score (Marge ROJAS, et al., 2019) failed to calculate for the following reasons:    Cannot find a previous HDL lab    Cannot find a previous total cholesterol lab    Patient states she does take atorvastatin at home, does not recall her previous lipid panel.  Heart score is 4  Risk factors include overweight, T2DM uncontrolled, HTN, HLD, obesity class 1  - Order for treadmill NM stress test  - Continue aspirin and statin    Class 1 obesity due to excess calories with serious comorbidity and body mass index (BMI) of 30.0 to 30.9 in adult- (present on admission)  Assessment & Plan  Recommending patient to follow up with their PCP on weight management plan  Counseled patient on weight control, diet management, following up with PCP    HTN (hypertension)- (present on admission)  Assessment & Plan  Continue home lisinopril, BP controlled    Dyslipidemia- (present on admission)  Assessment & Plan  Continue home atorvastatin  Lipid panel pending    DM (diabetes mellitus) (HCC)- (present on admission)  Assessment & Plan  Patient reported her last A1c was > 9  Uncontrolled at this time  - Continue home long-acting, switch to glargine as we do not have Levemir  - Hold p.o. medications  - ISS, Accu-Cheks and hypoglycemic protocol ordered        VTE prophylaxis: SCDs/TEDs    Total time spent on admission over 75 minutes.  This included my review with ER physician, review of ED  events, patient's history, outside records, recent records, face to face interview, physical examination; my review of lab results (CBC, chemistry panel), imaging review (CXR) and ECG.   In addition, counseling patient    Please note that this dictation was created using voice recognition software. I have made every reasonable attempt to correct obvious errors, but there may be errors of grammar and possibly content that I did not discover before finalizing the note.

## 2023-02-17 NOTE — PROGRESS NOTES
Bedside report received. No overnight events per night RN.  Patient A&O x 4. VS'S. RA. SR on telemetry monitor. NPO for stress test this morning. Complains of mild lower back pain will medicate per MAR.  POC discussed with patient. Pt verbalizes understanding. Call light and belongings with in reach. Bed locked and in lowest position, alarm and fall precautions in place.

## 2023-02-17 NOTE — ASSESSMENT & PLAN NOTE
The ASCVD Risk score (Marge ROJAS, et al., 2019) failed to calculate for the following reasons:    Cannot find a previous HDL lab    Cannot find a previous total cholesterol lab    Patient states she does take atorvastatin at home, does not recall her previous lipid panel.  Heart score is 4  Risk factors include overweight, T2DM uncontrolled, HTN, HLD, obesity class 1  - Order for treadmill NM stress test  - Continue aspirin and statin

## 2023-02-17 NOTE — ASSESSMENT & PLAN NOTE
Patient reported her last A1c was > 9  Uncontrolled at this time  - Continue home long-acting, switch to glargine as we do not have Levemir  - Hold p.o. medications  - ISS, Accu-Cheks and hypoglycemic protocol ordered

## 2023-02-17 NOTE — PROGRESS NOTES
4 Eyes Skin Assessment Completed by JESSI Alvarenga and JESSI Villagomez.    Head WDL  Ears WDL  Nose WDL  Mouth WDL  Neck WDL  Breast/Chest WDL  Shoulder Blades WDL  Spine WDL  (R) Arm/Elbow/Hand WDL  (L) Arm/Elbow/Hand WDL  Abdomen WDL  Groin WDL  Scrotum/Coccyx/Buttocks WDL  (R) Leg WDL  (L) Leg WDL  (R) Heel/Foot/Toe WDL  (L) Heel/Foot/Toe WDL          Devices In Places Tele Box      Interventions In Place Pressure Redistribution Mattress    Possible Skin Injury No    Pictures Uploaded Into Epic N/A  Wound Consult Placed N/A  RN Wound Prevention Protocol Ordered No

## 2023-02-17 NOTE — DISCHARGE PLANNING
Care Transition Team Assessment      LMSW met with pt at bedside to complete assessment. Pt A&Ox4 and able to verify the information on the face sheet. Pt lives with her S/O in a two story apartment that has about 10-15 steps to enter. Prior to this hospitalization pt was independent at home with ADLs and IADLs. Pt does not use any DME at baseline. Pt reported her S/O is good support for her. Pt is disabled and receives payments monthly. Pt denies any SA/MH. Pt does not have an AD.      Information Source  Orientation Level: Oriented X4  Information Given By: Patient    Readmission Evaluation  Is this a readmission?: No    Elopement Risk  Legal Hold: No  Ambulatory or Self Mobile in Wheelchair: No-Not an Elopement Risk    Interdisciplinary Discharge Planning  Lives with - Patient's Self Care Capacity: Significant Other, Adult Children  Patient or legal guardian wants to designate a caregiver: No  Support Systems: Spouse / Significant Other  Housing / Facility: 2 Story Apartment / Condo  Durable Medical Equipment: Not Applicable    Discharge Preparedness  What is your plan after discharge?: Home with help  What are your discharge supports?: Spouse  Prior Functional Level: Ambulatory  Difficulity with ADLs: None  Difficulity with IADLs: None    Functional Assesment  Prior Functional Level: Ambulatory    Finances  Financial Barriers to Discharge: No  Prescription Coverage: Yes    Vision / Hearing Impairment  Right Eye Vision: Impaired, Wears Glasses  Left Eye Vision: Impaired, Wears Glasses         Advance Directive  Advance Directive?: None    Domestic Abuse  Have you ever been the victim of abuse or violence?: No  Physical Abuse or Sexual Abuse: No  Verbal Abuse or Emotional Abuse: No  Possible Abuse/Neglect Reported to:: Not Applicable    Psychological Assessment  History of Substance Abuse: None  History of Psychiatric Problems: No    Discharge Risks or Barriers  Discharge risks or barriers?: No    Anticipated  Discharge Information  Discharge Disposition: Discharged to home/self care (01)

## 2023-02-17 NOTE — ASSESSMENT & PLAN NOTE
Recommending patient to follow up with their PCP on weight management plan  Counseled patient on weight control, diet management, following up with PCP

## 2023-02-17 NOTE — PROGRESS NOTES
Pt arrived from the ED able to ambulate without assistance. Pt A & O x 4 with no c/o chest pain or SOB. Discussed POC r/t stress test. Pt verbalized understanding.

## 2023-02-18 NOTE — DISCHARGE INSTRUCTIONS
Discharge Instructions per Joey Dickinson M.D.    Please follow-up with PCP as outpatient.    Return to ER in the event of new or worsening symptoms. Please note importance of compliance and the patient has agreed to proceed with all medical recommendations and follow up plan indicated above. All medications come with benefits and risks. Risks may include permanent injury or death and these risks can be minimized with close reassessment and monitoring. Please make it to your scheduled follow ups with PCP  Discharge Instructions    Discharged to home by car with relative. Discharged via wheelchair, hospital escort: Yes.  Special equipment needed: Not Applicable    Be sure to schedule a follow-up appointment with your primary care doctor or any specialists as instructed.     Discharge Plan:   Influenza Vaccine Indication: Not indicated: Previously immunized this influenza season and > 8 years of age    I understand that a diet low in cholesterol, fat, and sodium is recommended for good health. Unless I have been given specific instructions below for another diet, I accept this instruction as my diet prescription.   Other diet: diabetic     Special Instructions: None    -Is this patient being discharged with medication to prevent blood clots?  No    Is patient discharged on Warfarin / Coumadin?   No     Chest Pain, Nonspecific  It is often hard to give a specific diagnosis for the cause of chest pain. There is always a chance that your pain could be related to something serious, like a heart attack or a blood clot in the lungs. You need to follow up with your caregiver for further evaluation. More lab tests or other studies such as X-rays, electrocardiography, stress testing, or cardiac imaging may be needed to find the cause of your pain.  Most of the time, nonspecific chest pain improves within 2 to 3 days with rest and mild pain medicine. For the next few days, avoid physical exertion or activities that bring on  pain. Do not smoke. Avoid drinking alcohol. Call your caregiver for routine follow-up as advised.   SEEK IMMEDIATE MEDICAL CARE IF:  You develop increased chest pain or pain that radiates to the arm, neck, jaw, back, or abdomen.   You develop shortness of breath, increased coughing, or you start coughing up blood.   You have severe back or abdominal pain, nausea, or vomiting.   You develop severe weakness, fainting, fever, or chills.   Document Released: 12/18/2006 Document Revised: 03/11/2013 Document Reviewed: 06/06/2008  [employer]® Patient Information ©2013 [employer]

## 2023-02-18 NOTE — DISCHARGE SUMMARY
Discharge Summary    CHIEF COMPLAINT ON ADMISSION  Chief Complaint   Patient presents with    Chest Pain     Left sided radiating down arm       Reason for Admission  chest pain     Admission Date  2/16/2023    CODE STATUS  Full Code    HPI & HOSPITAL COURSE  This is a 53 y.o. female with history of diabetes, HTN, HLD, who presented 2/16/2023 with chest pain.  She is admitted. Trop trending negative x3. CXR unremarkable.  Echo and stress test were ordered which revealed unremarkable findings. No myocardial infarct or reversible ischemia.  Normal LEFT ventricular function.    Therefore, she is discharged in good and stable condition to home with close outpatient follow-up.  She is advised to follow-up with PCP in 1 week    The patient recovered much more quickly than anticipated on admission.    Discharge Date  2/17/23    FOLLOW UP ITEMS POST DISCHARGE  PCP    DISCHARGE DIAGNOSES  Principal Problem:    Chest pain POA: Yes  Active Problems:    DM (diabetes mellitus) (HCC) POA: Yes      Overview: diab feet exam:  11/11/10, JULY 12'2012    Dyslipidemia POA: Yes    HTN (hypertension) POA: Yes    Class 1 obesity due to excess calories with serious comorbidity and body mass index (BMI) of 30.0 to 30.9 in adult (Chronic) POA: Yes  Resolved Problems:    * No resolved hospital problems. *      FOLLOW UP  Future Appointments   Date Time Provider Department Center   3/2/2023  8:45 AM Taylor Jackson PT, MSPT [facility]   3/7/2023 11:00 AM Taylor Jackson PT, MSPT [facility]   3/14/2023 11:00 AM Taylor Jackson PT, [facility]SPT [facility]   3/21/2023 11:00 AM Taylor Jackson PT, [facility]SP[facility]   3/28/2023  1:00 PM Taylor Jackson PT, [facility]SPT [facility]   4/4/2023 11:00 AM Taylor Jackson PT, [facility]   4/6/2023 10:40 AM Luis Manuel Cornoado M.D. [facility] None   8/25/2023  8:00 AM Waqas Medina M.D. [facility] None   12/18/2023  1:00 PM Jun Smith M.D. [facility] None     Faustina  VALERIA Painting  [address] [facility] [address]  [phone]    Follow up in 1 week(s)        MEDICATIONS ON DISCHARGE     Medication List        CHANGE how you take these medications        Instructions   Emgality 120 MG/ML Soaj  What changed:   how much to take  how to take this  when to take this  Generic drug: Galcanezumab-gnlm   INJECT 1 PEN SUBCUTANEOUSLY AS INSTRUCTED EVERY 4 WEEKS            CONTINUE taking these medications        Instructions   atorvastatin 40 MG Tabs  Commonly known as: LIPITOR   Take 40 mg by mouth every day.  Dose: 40 mg     colestipol 1 GM Tabs  Commonly known as: COLESTID   Take 1 g by mouth every day.  Dose: 1 g     [employer] Lancets Ultra-Thin 30G Misc   AS DIRECTED FOUR TIMES DAILY CHECK SUGARS 30 DAYS     famotidine 40 MG Tabs  Commonly known as: PEPCID   Take 40 mg by mouth every day.  Dose: 40 mg     gabapentin 800 MG tablet  Commonly known as: NEURONTIN   Take 1 Tab by mouth 3 times a day.  Dose: 800 mg     Jardiance 10 MG Tabs tablet  Generic drug: Empagliflozin   Take 10 mg by mouth every day.  Dose: 10 mg     Levemir FlexTouch 100 UNIT/ML injection PEN  Generic drug: insulin detemir   Inject 26 Units under the skin every evening.  Dose: 26 Units     lisinopril 20 MG Tabs  Commonly known as: PRINIVIL   Take 1 Tab by mouth every day.  Dose: 20 mg     metformin 1000 MG tablet  Commonly known as: GLUCOPHAGE   Take 1,000 mg by mouth 2 times a day.  Dose: 1,000 mg     * Misc. Devices Misc   Needles for solostar lantus use as directed     * Misc. Devices Misc   Doctor's comments: One touch Lancets and test strip  1. Glucometer #1   2. Test strips and lancets to test blood sugars qid     #120     * Misc. Devices Misc   Doctor's comments: Dr Oneil, out of clinic.Toribio MARTINEZ CASSI #[id number]  DM lancets Dispense brand covered by patients insurance Testing frequency: Twice dailyDx DM w/o complication. E11.9     pantoprazole 40 MG Tbec  Commonly known as: PROTONIX   Take 1 Tablet by mouth  every day.  Dose: 40 mg     traZODone 50 MG Tabs  Commonly known as: DESYREL   Take  mg by mouth every evening. 1-2 tablets  Dose:  mg     Trokendi  MG Cp24  Generic drug: Topiramate ER   Take 200 mg by mouth every day.  Dose: 200 mg     True Metrix Air Glucose Meter Loyda   Doctor's comments: Dr Oneil, out of clinic.Toribio MARTINEZ CASSI #[id number]  1 Each by Does not apply route 2 Times a Day.  Dose: 1 Each     True Metrix Blood Glucose Test strip  Generic drug: glucose blood   USE AS DIRECTED 3-4 TIMES A DAY     Ubrelvy 100 MG Tabs  Generic drug: Ubrogepant   Take 1 Tablet by mouth as needed (1 tab at headache osnet, repeat in 2 hour prn).  Dose: 1 Tablet           * This list has 3 medication(s) that are the same as other medications prescribed for you. Read the directions carefully, and ask your doctor or other care provider to review them with you.                  Allergies  Allergies   Allergen Reactions    Nkda [No Known Drug Allergy]        DIET  Orders Placed This Encounter   Procedures    Diet Order Diet: Cardiac; Second Modifier: (optional): Consistent CHO (Diabetic); Miscellaneous modifications: (optional): No Decaf, No Caffeine(for test)     Standing Status:   Standing     Number of Occurrences:   1     Order Specific Question:   Diet:     Answer:   Cardiac [6]     Order Specific Question:   Second Modifier: (optional)     Answer:   Consistent CHO (Diabetic) [4]     Order Specific Question:   Miscellaneous modifications: (optional)     Answer:   No Decaf, No Caffeine(for test) [11]       ACTIVITY  As tolerated.  Weight bearing as tolerated    CONSULTATIONS  na    PROCEDURES  na    LABORATORY  Lab Results   Component Value Date    SODIUM 140 02/17/2023    POTASSIUM 4.5 02/17/2023    CHLORIDE 103 02/17/2023    CO2 26 02/17/2023    GLUCOSE 280 (H) 02/17/2023    BUN 22 02/17/2023    CREATININE 1.06 02/17/2023    CREATININE 0.6 05/11/2009        Lab Results   Component Value Date    WBC  6.2 02/17/2023    HEMOGLOBIN 10.4 (L) 02/17/2023    HEMATOCRIT 33.1 (L) 02/17/2023    PLATELETCT 200 02/17/2023      NM-CARDIAC STRESS TEST   Final Result      EC-ECHOCARDIOGRAM COMPLETE W/O CONT   Final Result      DX-CHEST-PORTABLE (1 VIEW)   Final Result         1. No acute cardiopulmonary abnormalities are identified.          Total time of the discharge process 31 minutes.

## 2023-02-18 NOTE — PROGRESS NOTES
Pt. being discharged. Pt. educated on discharge instructions and new prescriptions. Pt verbalizes understanding. Follow up appointment made with PCP. PIV removed, monitor checked in. Pt. Going home with .

## 2023-02-22 ENCOUNTER — TELEPHONE (OUTPATIENT)
Dept: NEUROLOGY | Facility: MEDICAL CENTER | Age: 53
End: 2023-02-22
Payer: MEDICAID

## 2023-02-22 DIAGNOSIS — G43.119 MIGRAINE WITH AURA, INTRACTABLE, WITHOUT STATUS MIGRAINOSUS: ICD-10-CM

## 2023-02-22 RX ORDER — TOPIRAMATE 200 MG/1
200 CAPSULE, EXTENDED RELEASE ORAL DAILY
Qty: 90 CAPSULE | Refills: 3 | Status: SHIPPED | OUTPATIENT
Start: 2023-02-22 | End: 2023-09-08 | Stop reason: SDUPTHER

## 2023-02-22 NOTE — TELEPHONE ENCOUNTER
Received request via: Patient    Was the patient seen in the last year in this department? Yes    Does the patient have an active prescription (recently filled or refills available) for medication(s) requested? No    Does the patient have detention Plus and need 100 day supply (blood pressure, diabetes and cholesterol meds only)? Medication is not for cholesterol, blood pressure or diabetes      Pt states that she has called Dr. Rivera medical assistant 3 times with no response.

## 2023-02-23 ENCOUNTER — TELEPHONE (OUTPATIENT)
Dept: PHYSICAL MEDICINE AND REHAB | Facility: MEDICAL CENTER | Age: 53
End: 2023-02-23
Payer: MEDICAID

## 2023-02-24 NOTE — TELEPHONE ENCOUNTER
The Patient called and said that the Physical Therapy needs you to send in a new referral for her to continue care.

## 2023-02-28 ENCOUNTER — APPOINTMENT (OUTPATIENT)
Dept: PHYSICAL THERAPY | Facility: REHABILITATION | Age: 53
End: 2023-02-28
Attending: PHYSICAL MEDICINE & REHABILITATION
Payer: MEDICAID

## 2023-03-02 ENCOUNTER — PHYSICAL THERAPY (OUTPATIENT)
Dept: PHYSICAL THERAPY | Facility: REHABILITATION | Age: 53
End: 2023-03-02
Attending: PHYSICAL MEDICINE & REHABILITATION
Payer: MEDICAID

## 2023-03-02 DIAGNOSIS — M47.816 LUMBAR SPONDYLOSIS: ICD-10-CM

## 2023-03-02 DIAGNOSIS — M25.512 CHRONIC LEFT SHOULDER PAIN: ICD-10-CM

## 2023-03-02 DIAGNOSIS — G89.29 CHRONIC LEFT SHOULDER PAIN: ICD-10-CM

## 2023-03-02 PROCEDURE — 97162 PT EVAL MOD COMPLEX 30 MIN: CPT

## 2023-03-02 ASSESSMENT — ENCOUNTER SYMPTOMS
PAIN SCALE AT LOWEST: 0
PAIN TIMING: INTERMITTENT
PAIN SCALE AT HIGHEST: 7
QUALITY: SHARP
PAIN SCALE: 7
PAIN LOCATION: ANTERIOR/LATERAL SHOULDER

## 2023-03-02 NOTE — OP THERAPY EVALUATION
Outpatient Physical Therapy  INITIAL EVALUATION    [facility]  [address] [facility], [address]  [place] [address]  Phone:  [phone]    Date of Evaluation: 2023    Patient: Meggan Espinoza  YOB: 1970  MRN: [id number]     Referring Provider: Luis Manuel Coronado M.D.  [address]  [address]  [place] [address]   Referring Diagnosis Arthritis of left acromioclavicular joint [M19.012];Chronic left shoulder pain [M25.512, G89.29];Lumbar spondylosis [M47.816];Lumbar degenerative disc disease [M51.36];Sacroiliac joint dysfunction of both sides [M53.3];Cervical spondylosis [M47.812];Chronic neck pain [M54.2, G89.29]     Time Calculation                 Chief Complaint: Left shoulder problem    Date of onset of impairment: 10/18/2022    Subjective:   History of Present Illness:     Mechanism of injury:  Chronic left shoulder pain x several months insideous onset., right has been symptomatic as well.    Patient is left handed.  Difficulty putting arm up over head, reaching,carrying heavy objects, lifting grocery bag .  + numbness and tingling anterior chest radiates down arm to thumb and fingers   N/T wakes her up at night. + cervical pain  left posterior/lateral  intermittent  , arm paresthesia is present when neck is symptoms.    PMH:  no motor vehicle accidents or known trauma, chronic lumbar pain, + carpal tunnel surgery 2 years-not effective , right shoulder biceps tenodesis  No pain meds  Sleep disturbance:  Interrupted sleep  Pain:     Current pain ratin    At best pain ratin    At worst pain ratin    Location:  Anterior/lateral shoulder    Quality:  Sharp (numbness down arm)    Pain timing:  Intermittent    Progression:  Unchanged    Past Medical History:   Diagnosis Date    Arthritis     back    Bowel habit changes     IBS for years    Diabetes     Dyslipidemia     GERD (gastroesophageal reflux disease)     Heart burn     High cholesterol     HTN  (hypertension)     Hypertension     Lower back pain     chronic    Vitamin deficiency      Past Surgical History:   Procedure Laterality Date    MS INJECTION,SACROILIAC JOINT Bilateral 10/11/2022    Procedure: BILATERAL sacroiliac joint injection with fluoroscopic guidance;  Surgeon: Luis Manuel Coronado M.D.;  Location: [facility];  Service: Pain Management    MS NEUROPLASTY & OR TRANSPOS MEDIAN NRV CARPAL MICHELLE Left 2/15/2022    Procedure: LEFT OPEN CARPAL TUNNEL RELEASE;  Surgeon: Mariusz Corrigan M.D.;  Location: SURGERY SAME DAY [facility];  Service: Orthopedics    MS SHLDR ARTHROSCOP,PART ACROMIOPLAS Right 9/22/2020    Procedure: DECOMPRESSION, SHOULDER, ARTHROSCOPIC - SUBACROMIAL, LABRAL DEBRIDEMENT;  Surgeon: Toribio Coronado M.D.;  Location: SURGERY [facility];  Service: Orthopedics    PB ARTHROSCOPY SHOULDER SURGICAL BICEPS TENODES* Right 9/22/2020    Procedure: ARTHROSCOPY, SHOULDER, WITH BICEPS TENODESIS;  Surgeon: Toribio Coronado M.D.;  Location: SURGERY [facility];  Service: Orthopedics    OTHER ORTHOPEDIC SURGERY  2017    left carpal tunnel release    CHOLECYSTECTOMY      TONSILLECTOMY      TUBAL COAGULATION LAPAROSCOPIC BILATERAL       Social History     Tobacco Use    Smoking status: Never    Smokeless tobacco: Never   Substance Use Topics    Alcohol use: No     Family and Occupational History     Socioeconomic History    Marital status:      Spouse name: Not on file    Number of children: Not on file    Years of education: Not on file    Highest education level: Not on file   Occupational History    Not on file       Objective     Postural Observations  Seated posture: fair  Standing posture: fair  Correction of posture: has no consistent effect      Cervical Screen    Cervical range of motion within normal limits with the following exceptions: Moderately limited cervical extension increase//NW central lower cervical pain  Otherwise WNL flexion, rotation        Active Range of Motion   Left Shoulder   Normal active range of motion    Right Shoulder   Normal active range of motion    Additional Active Range of Motion Details  Pain at End ROM left shoulder flexion and abduction  120 degrees ER left  80 degrees right in 90 degrees abduction  70 degrees IR left ; 70 degrees right  90 degrees abduction left      Strength:      Additional Strength Details  Strong and painful left resisted ER in neutral  Strong/no pain resisted IR in neutral    Tests     Left Shoulder   Positive empty can and ULTT1.     Right Shoulder   Positive ULTT1.       Therapeutic Exercises (CPT 18121):     1. posture re ed with cervical roll for sleeping inside pillow case      Therapeutic Exercise Summary: Access Code: [email or web address]  URL: [email or web address]  Date: 03/02/2023  Prepared by: Taylor Jackson    Exercises  Standing Cervical Retraction - 6 x daily - 7 x weekly - 1 sets - 5 reps  Shoulder External Rotation and Scapular Retraction with Resistance - 3 x daily - 7 x weekly - 1 sets - 2 reps - 30 hold      Time-based treatments/modalities:           Assessment, Response and Plan:   Impairments: pain with function    Assessment details:  Patient presents with Symptoms consistent with cervical derangement syndrome with increasing  left anterior/lateral shoulder and UE numbness and pain over the past several months.  Patient has moderate loss of cervical extension and a + ULNT 1 left. AROM shoulder flexion and abduction demonstrate pain at EROM. Resisted ER in neutral is painful but strong.  Symptoms limit her ability to sleep, reach and lift  using left UE for household activities. Shoulder symptoms abolish with Repeated cervical retraction in sitting. Patient was educated in using a cervical roll for sleep and was given a home program with focus on increasing retraction/extension and postural awareness.  Recommend continued PT to meet goals stated below.   Barriers to therapy:   Comorbidities  Prognosis: good    Goals:   Short Term Goals:   Patient able to sleep with >50% decreased symptoms left shoulder  Patient able to use Left UE for ADLS with >25% decreased symptoms  Short term goal time span:  2-4 weeks      Long Term Goals:    Patient able to sleep with >75% decreased symptoms  Patient able to perform ADLS with >75% decreased symptoms  Long term goal time span:  6-8 weeks    Plan:   Therapy options:  Physical therapy treatment to continue  Planned therapy interventions:  Neuromuscular Re-education (CPT 57317), E Stim Unattended (CPT 21218) and Therapeutic Exercise (CPT 66524)  Frequency:  1x week  Duration in weeks:  12  Duration in visits:  12  Discussed with:  Patient    Functional Assessment Used        Referring provider co-signature:  I have reviewed this plan of care and my co-signature certifies the need for services.    Certification Period: 03/02/2023 to  05/22/23    Physician Signature: ________________________________ Date: ______________

## 2023-03-07 ENCOUNTER — APPOINTMENT (OUTPATIENT)
Dept: PHYSICAL THERAPY | Facility: REHABILITATION | Age: 53
End: 2023-03-07
Attending: PHYSICAL MEDICINE & REHABILITATION
Payer: MEDICAID

## 2023-03-14 ENCOUNTER — APPOINTMENT (OUTPATIENT)
Dept: PHYSICAL THERAPY | Facility: REHABILITATION | Age: 53
End: 2023-03-14
Attending: PHYSICAL MEDICINE & REHABILITATION
Payer: MEDICAID

## 2023-03-17 ENCOUNTER — APPOINTMENT (OUTPATIENT)
Dept: RADIOLOGY | Facility: MEDICAL CENTER | Age: 53
End: 2023-03-17
Attending: EMERGENCY MEDICINE
Payer: MEDICAID

## 2023-03-17 ENCOUNTER — HOSPITAL ENCOUNTER (EMERGENCY)
Facility: MEDICAL CENTER | Age: 53
End: 2023-03-17
Attending: EMERGENCY MEDICINE
Payer: MEDICAID

## 2023-03-17 VITALS
BODY MASS INDEX: 30.5 KG/M2 | TEMPERATURE: 97.5 F | OXYGEN SATURATION: 97 % | SYSTOLIC BLOOD PRESSURE: 124 MMHG | HEART RATE: 72 BPM | DIASTOLIC BLOOD PRESSURE: 74 MMHG | RESPIRATION RATE: 15 BRPM | WEIGHT: 151 LBS

## 2023-03-17 DIAGNOSIS — E86.0 DEHYDRATION: ICD-10-CM
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
## 2023-03-17 DIAGNOSIS — Z86.39 HISTORY OF DIABETES MELLITUS, TYPE II: ICD-10-CM

## 2023-03-17 DIAGNOSIS — N39.0 URINARY TRACT INFECTION WITHOUT HEMATURIA, SITE UNSPECIFIED: ICD-10-CM

## 2023-03-17 DIAGNOSIS — I95.89 HYPOTENSION DUE TO HYPOVOLEMIA: ICD-10-CM

## 2023-03-17 DIAGNOSIS — E86.1 HYPOTENSION DUE TO HYPOVOLEMIA: ICD-10-CM

## 2023-03-17 DIAGNOSIS — R73.9 BLOOD GLUCOSE ELEVATED: ICD-10-CM

## 2023-03-17 LAB
ALBUMIN SERPL BCP-MCNC: 4.6 G/DL (ref 3.2–4.9)
ALBUMIN/GLOB SERPL: 1.8 G/DL
ALP SERPL-CCNC: 107 U/L (ref 30–99)
ALT SERPL-CCNC: 12 U/L (ref 2–50)
AMPHET UR QL SCN: NEGATIVE
ANION GAP SERPL CALC-SCNC: 14 MMOL/L (ref 7–16)
APPEARANCE UR: CLEAR
AST SERPL-CCNC: 11 U/L (ref 12–45)
BACTERIA #/AREA URNS HPF: NEGATIVE /HPF
BARBITURATES UR QL SCN: NEGATIVE
BASOPHILS # BLD AUTO: 0.6 % (ref 0–1.8)
BASOPHILS # BLD: 0.04 K/UL (ref 0–0.12)
BENZODIAZ UR QL SCN: NEGATIVE
BILIRUB SERPL-MCNC: 0.6 MG/DL (ref 0.1–1.5)
BILIRUB UR QL STRIP.AUTO: NEGATIVE
BUN SERPL-MCNC: 62 MG/DL (ref 8–22)
BZE UR QL SCN: NEGATIVE
CALCIUM ALBUM COR SERPL-MCNC: 8.6 MG/DL (ref 8.5–10.5)
CALCIUM SERPL-MCNC: 9.1 MG/DL (ref 8.5–10.5)
CANNABINOIDS UR QL SCN: NEGATIVE
CHLORIDE SERPL-SCNC: 107 MMOL/L (ref 96–112)
CO2 SERPL-SCNC: 15 MMOL/L (ref 20–33)
COLOR UR: YELLOW
CREAT SERPL-MCNC: 1.5 MG/DL (ref 0.5–1.4)
EKG IMPRESSION: NORMAL
EOSINOPHIL # BLD AUTO: 0.1 K/UL (ref 0–0.51)
EOSINOPHIL NFR BLD: 1.6 % (ref 0–6.9)
EPI CELLS #/AREA URNS HPF: NEGATIVE /HPF
ERYTHROCYTE [DISTWIDTH] IN BLOOD BY AUTOMATED COUNT: 53.2 FL (ref 35.9–50)
ETHANOL BLD-MCNC: <10.1 MG/DL
GFR SERPLBLD CREATININE-BSD FMLA CKD-EPI: 42 ML/MIN/1.73 M 2
GLOBULIN SER CALC-MCNC: 2.6 G/DL (ref 1.9–3.5)
GLUCOSE SERPL-MCNC: 229 MG/DL (ref 65–99)
GLUCOSE UR STRIP.AUTO-MCNC: >=1000 MG/DL
HCT VFR BLD AUTO: 37.1 % (ref 37–47)
HGB BLD-MCNC: 11.2 G/DL (ref 12–16)
HYALINE CASTS #/AREA URNS LPF: ABNORMAL /LPF
IMM GRANULOCYTES # BLD AUTO: 0.03 K/UL (ref 0–0.11)
IMM GRANULOCYTES NFR BLD AUTO: 0.5 % (ref 0–0.9)
KETONES UR STRIP.AUTO-MCNC: NEGATIVE MG/DL
LEUKOCYTE ESTERASE UR QL STRIP.AUTO: ABNORMAL
LYMPHOCYTES # BLD AUTO: 1.14 K/UL (ref 1–4.8)
LYMPHOCYTES NFR BLD: 18.1 % (ref 22–41)
MCH RBC QN AUTO: 25.6 PG (ref 27–33)
MCHC RBC AUTO-ENTMCNC: 30.2 G/DL (ref 33.6–35)
MCV RBC AUTO: 84.7 FL (ref 81.4–97.8)
METHADONE UR QL SCN: NEGATIVE
MICRO URNS: ABNORMAL
MONOCYTES # BLD AUTO: 0.29 K/UL (ref 0–0.85)
MONOCYTES NFR BLD AUTO: 4.6 % (ref 0–13.4)
NEUTROPHILS # BLD AUTO: 4.69 K/UL (ref 2–7.15)
NEUTROPHILS NFR BLD: 74.6 % (ref 44–72)
NITRITE UR QL STRIP.AUTO: NEGATIVE
NRBC # BLD AUTO: 0 K/UL
NRBC BLD-RTO: 0 /100 WBC
OPIATES UR QL SCN: NEGATIVE
OXYCODONE UR QL SCN: NEGATIVE
PCP UR QL SCN: NEGATIVE
PH UR STRIP.AUTO: 5 [PH] (ref 5–8)
PLATELET # BLD AUTO: 226 K/UL (ref 164–446)
PMV BLD AUTO: 9.9 FL (ref 9–12.9)
POTASSIUM SERPL-SCNC: 5.6 MMOL/L (ref 3.6–5.5)
PROPOXYPH UR QL SCN: NEGATIVE
PROT SERPL-MCNC: 7.2 G/DL (ref 6–8.2)
PROT UR QL STRIP: NEGATIVE MG/DL
RBC # BLD AUTO: 4.38 M/UL (ref 4.2–5.4)
RBC # URNS HPF: ABNORMAL /HPF
RBC UR QL AUTO: NEGATIVE
SODIUM SERPL-SCNC: 136 MMOL/L (ref 135–145)
SP GR UR STRIP.AUTO: 1.01
TSH SERPL DL<=0.005 MIU/L-ACNC: 0.36 UIU/ML (ref 0.38–5.33)
UROBILINOGEN UR STRIP.AUTO-MCNC: 0.2 MG/DL
WBC # BLD AUTO: 6.3 K/UL (ref 4.8–10.8)
WBC #/AREA URNS HPF: ABNORMAL /HPF

## 2023-03-17 PROCEDURE — 84443 ASSAY THYROID STIM HORMONE: CPT

## 2023-03-17 PROCEDURE — 700102 HCHG RX REV CODE 250 W/ 637 OVERRIDE(OP): Performed by: EMERGENCY MEDICINE

## 2023-03-17 PROCEDURE — 99285 EMERGENCY DEPT VISIT HI MDM: CPT

## 2023-03-17 PROCEDURE — 36415 COLL VENOUS BLD VENIPUNCTURE: CPT

## 2023-03-17 PROCEDURE — 82077 ASSAY SPEC XCP UR&BREATH IA: CPT

## 2023-03-17 PROCEDURE — 700105 HCHG RX REV CODE 258: Performed by: EMERGENCY MEDICINE

## 2023-03-17 PROCEDURE — 87086 URINE CULTURE/COLONY COUNT: CPT

## 2023-03-17 PROCEDURE — 85025 COMPLETE CBC W/AUTO DIFF WBC: CPT

## 2023-03-17 PROCEDURE — 70450 CT HEAD/BRAIN W/O DYE: CPT

## 2023-03-17 PROCEDURE — 80053 COMPREHEN METABOLIC PANEL: CPT

## 2023-03-17 PROCEDURE — A9270 NON-COVERED ITEM OR SERVICE: HCPCS | Performed by: EMERGENCY MEDICINE

## 2023-03-17 PROCEDURE — 93005 ELECTROCARDIOGRAM TRACING: CPT | Performed by: EMERGENCY MEDICINE

## 2023-03-17 PROCEDURE — 81001 URINALYSIS AUTO W/SCOPE: CPT | Mod: XU

## 2023-03-17 PROCEDURE — 80307 DRUG TEST PRSMV CHEM ANLYZR: CPT

## 2023-03-17 RX ORDER — CEFDINIR 300 MG/1
300 CAPSULE ORAL ONCE
Status: COMPLETED | OUTPATIENT
Start: 2023-03-17 | End: 2023-03-17

## 2023-03-17 RX ORDER — SODIUM CHLORIDE, SODIUM LACTATE, POTASSIUM CHLORIDE, CALCIUM CHLORIDE 600; 310; 30; 20 MG/100ML; MG/100ML; MG/100ML; MG/100ML
1000 INJECTION, SOLUTION INTRAVENOUS ONCE
Status: COMPLETED | OUTPATIENT
Start: 2023-03-17 | End: 2023-03-17

## 2023-03-17 RX ORDER — CEFDINIR 300 MG/1
300 CAPSULE ORAL 2 TIMES DAILY
Qty: 14 CAPSULE | Refills: 0 | Status: ACTIVE | OUTPATIENT
Start: 2023-03-17 | End: 2023-04-27

## 2023-03-17 RX ORDER — SODIUM CHLORIDE 9 MG/ML
1000 INJECTION, SOLUTION INTRAVENOUS ONCE
Status: COMPLETED | OUTPATIENT
Start: 2023-03-17 | End: 2023-03-17

## 2023-03-17 RX ADMIN — SODIUM CHLORIDE, POTASSIUM CHLORIDE, SODIUM LACTATE AND CALCIUM CHLORIDE 1000 ML: 600; 310; 30; 20 INJECTION, SOLUTION INTRAVENOUS at 16:20

## 2023-03-17 RX ADMIN — CEFDINIR 300 MG: 300 CAPSULE ORAL at 16:20

## 2023-03-17 RX ADMIN — SODIUM CHLORIDE 1000 ML: 9 INJECTION, SOLUTION INTRAVENOUS at 12:43

## 2023-03-17 ASSESSMENT — LIFESTYLE VARIABLES: DO YOU DRINK ALCOHOL: NO

## 2023-03-17 ASSESSMENT — FIBROSIS 4 INDEX: FIB4 SCORE: 0.99
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
## 2023-03-18 NOTE — ED NOTES
Discharge instructions discussed with pt, verbalizes understanding. PIV removed. All belongings with pt when leaving unit. Pt to lobby with steady gait.

## 2023-03-18 NOTE — DISCHARGE INSTRUCTIONS
You were dehydrated because your blood sugar has been too high and you been losing water in the urine.  You may also have a UTI.  Increase fluid intake.  Be sure to take your diabetes medicines.  Check your blood sugar.  Take antibiotic as prescribed.  See your doctor if not better Monday.  Return for dizziness fainting or increasing confusion.

## 2023-03-19 LAB
BACTERIA UR CULT: NORMAL
SIGNIFICANT IND 70042: NORMAL
SITE SITE: NORMAL
SOURCE SOURCE: NORMAL

## 2023-03-21 ENCOUNTER — PHYSICAL THERAPY (OUTPATIENT)
Dept: PHYSICAL THERAPY | Facility: REHABILITATION | Age: 53
End: 2023-03-21
Attending: PHYSICAL MEDICINE & REHABILITATION
Payer: MEDICAID

## 2023-03-21 DIAGNOSIS — G89.29 CHRONIC LEFT SHOULDER PAIN: ICD-10-CM

## 2023-03-21 DIAGNOSIS — M25.512 CHRONIC LEFT SHOULDER PAIN: ICD-10-CM

## 2023-03-21 PROCEDURE — 97110 THERAPEUTIC EXERCISES: CPT

## 2023-03-21 PROCEDURE — 97112 NEUROMUSCULAR REEDUCATION: CPT
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
## 2023-03-28 ENCOUNTER — APPOINTMENT (OUTPATIENT)
Dept: PHYSICAL THERAPY | Facility: REHABILITATION | Age: 53
End: 2023-03-28
Attending: PHYSICAL MEDICINE & REHABILITATION
Payer: MEDICAID

## 2023-04-04 ENCOUNTER — PHYSICAL THERAPY (OUTPATIENT)
Dept: PHYSICAL THERAPY | Facility: REHABILITATION | Age: 53
End: 2023-04-04
Attending: PHYSICAL MEDICINE & REHABILITATION
Payer: MEDICAID

## 2023-04-04 DIAGNOSIS — M25.512 CHRONIC LEFT SHOULDER PAIN: ICD-10-CM

## 2023-04-04 DIAGNOSIS — G89.29 CHRONIC LEFT SHOULDER PAIN: ICD-10-CM

## 2023-04-04 PROCEDURE — 97112 NEUROMUSCULAR REEDUCATION: CPT

## 2023-04-04 PROCEDURE — 97110 THERAPEUTIC EXERCISES: CPT

## 2023-04-04 NOTE — OP THERAPY DAILY TREATMENT
Outpatient Physical Therapy  DAILY TREATMENT     [facility] Outpatient Physical Therapy [place] [id number] [facility], [address]  [place] [address]  Phone:  [phone]    Date: 04/04/2023    Patient: Meggan Espinoza  YOB: 1970  MRN: [id number]     Time Calculation    Start time: 1100  Stop time: 1145 Time Calculation (min): 45 minutes         Chief Complaint: shoulder/cervical problem  Visit #: 12    SUBJECTIVE:  Shoulder pain has not been as frequent , low back pain has been more symptomatic limiting community distance walking/shopping trips    OBJECTIVE:  Current objective measures: full AROM left shoulder painfree          Therapeutic Exercises (CPT 33893):     1. posture re ed with cervical roll for sleeping inside pillow case    2. nu step L5, 10 min    3. repeated cervical retraction, x 5    4. repeated thoracic extension with chairback overpressure, x 5    5. row black tubing, 2 x 2 min    6. shoulder extension black tubing, 2 x 2 min    7. scapular retraction with shoulder ER, pink tb 2 x 30 sec      Therapeutic Exercise Summary: Access Code: [email or web address]  URL: [email or web address]  Date: 03/02/2023  Prepared by: Taylor Jackson    Exercises  Standing Cervical Retraction - 6 x daily - 7 x weekly - 1 sets - 5 reps  Shoulder External Rotation and Scapular Retraction with Resistance - 3 x daily - 7 x weekly - 1 sets - 2 reps - 30 hold      Therapeutic Treatments and Modalities:     1. Neuromuscular Re-education (CPT 87393), lumbar extension mobilization, cervical retraction mobilization  Time-based treatments/modalities:    Physical Therapy Timed Treatment Charges  Neuromusc re-ed, balance, coor, post minutes (CPT 97055): 10 minutes  Therapeutic exercise minutes (CPT 21883): 30 minutes  ASSESSMENT:   Response to treatment: asymptomatic left shoulder throughout session with full AROM.  Patient has been consistent with repeated cervical retraction at home which has abolished left shoulder symptoms.   Progressed cervical stability/scapular strength today with good outcome.   Continue with current POC    PLAN/RECOMMENDATIONS:   Plan for treatment: therapy treatment to continue next visit.  Planned interventions for next visit: continue with current treatment.

## 2023-04-06 ENCOUNTER — APPOINTMENT (OUTPATIENT)
Dept: PHYSICAL MEDICINE AND REHAB | Facility: MEDICAL CENTER | Age: 53
End: 2023-04-06
Payer: MEDICAID

## 2023-04-17 ENCOUNTER — APPOINTMENT (OUTPATIENT)
Dept: PHYSICAL THERAPY | Facility: REHABILITATION | Age: 53
End: 2023-04-17
Attending: PHYSICAL MEDICINE & REHABILITATION
Payer: MEDICAID

## 2023-04-20 ENCOUNTER — HOSPITAL ENCOUNTER (OUTPATIENT)
Dept: RADIOLOGY | Facility: MEDICAL CENTER | Age: 53
End: 2023-04-20
Attending: FAMILY MEDICINE
Payer: MEDICAID

## 2023-04-20 DIAGNOSIS — R41.82 ALTERED MENTAL STATUS, UNSPECIFIED ALTERED MENTAL STATUS TYPE: ICD-10-CM

## 2023-04-24 ENCOUNTER — APPOINTMENT (OUTPATIENT)
Dept: PHYSICAL THERAPY | Facility: REHABILITATION | Age: 53
End: 2023-04-24
Attending: PHYSICAL MEDICINE & REHABILITATION
Payer: MEDICAID

## 2023-04-26 ENCOUNTER — OFFICE VISIT (OUTPATIENT)
Dept: PHYSICAL MEDICINE AND REHAB | Facility: MEDICAL CENTER | Age: 53
End: 2023-04-26
Payer: MEDICAID

## 2023-04-26 VITALS
SYSTOLIC BLOOD PRESSURE: 124 MMHG | BODY MASS INDEX: 30.44 KG/M2 | DIASTOLIC BLOOD PRESSURE: 76 MMHG | OXYGEN SATURATION: 93 % | HEART RATE: 76 BPM | TEMPERATURE: 97.9 F | HEIGHT: 59 IN | WEIGHT: 151.01 LBS

## 2023-04-26 DIAGNOSIS — M47.816 LUMBAR SPONDYLOSIS: ICD-10-CM

## 2023-04-26 DIAGNOSIS — M47.812 CERVICAL SPONDYLOSIS: ICD-10-CM

## 2023-04-26 DIAGNOSIS — G89.29 CHRONIC NECK PAIN: ICD-10-CM

## 2023-04-26 DIAGNOSIS — M79.10 MYALGIA: ICD-10-CM

## 2023-04-26 DIAGNOSIS — M70.62 GREATER TROCHANTERIC BURSITIS OF BOTH HIPS: ICD-10-CM

## 2023-04-26 DIAGNOSIS — M70.61 GREATER TROCHANTERIC BURSITIS OF BOTH HIPS: ICD-10-CM

## 2023-04-26 DIAGNOSIS — M51.36 LUMBAR DEGENERATIVE DISC DISEASE: ICD-10-CM

## 2023-04-26 DIAGNOSIS — M53.3 SACROILIAC JOINT DYSFUNCTION OF BOTH SIDES: ICD-10-CM

## 2023-04-26 DIAGNOSIS — M25.512 CHRONIC LEFT SHOULDER PAIN: ICD-10-CM

## 2023-04-26 DIAGNOSIS — M54.2 CHRONIC NECK PAIN: ICD-10-CM

## 2023-04-26 DIAGNOSIS — M54.50 CHRONIC BILATERAL LOW BACK PAIN WITHOUT SCIATICA: ICD-10-CM

## 2023-04-26 DIAGNOSIS — G89.29 CHRONIC BILATERAL LOW BACK PAIN WITHOUT SCIATICA: ICD-10-CM

## 2023-04-26 DIAGNOSIS — G89.29 CHRONIC LEFT SHOULDER PAIN: ICD-10-CM

## 2023-04-26 DIAGNOSIS — M19.012 ARTHRITIS OF LEFT ACROMIOCLAVICULAR JOINT: ICD-10-CM

## 2023-04-26 PROCEDURE — 99214 OFFICE O/P EST MOD 30 MIN: CPT | Performed by: PHYSICAL MEDICINE & REHABILITATION

## 2023-04-26 ASSESSMENT — PATIENT HEALTH QUESTIONNAIRE - PHQ9: CLINICAL INTERPRETATION OF PHQ2 SCORE: 0

## 2023-04-26 ASSESSMENT — PAIN SCALES - GENERAL: PAINLEVEL: 7=MODERATE-SEVERE PAIN

## 2023-04-26 ASSESSMENT — FIBROSIS 4 INDEX: FIB4 SCORE: 0.73

## 2023-04-26 NOTE — PROGRESS NOTES
Follow up patient note  Interventional spine and Pain  Physiatry (physical medicine and  Rehabilitation)       Chief complaint:   Chief Complaint   Patient presents with    Follow-Up     3m fv          HISTORY    Please see new patient note by Dr Coronado,  for more details.     HPI  Patient identification: Meggan Espinoza ,  1970,   With Diagnoses of Lumbar spondylosis, Lumbar degenerative disc disease, Sacroiliac joint dysfunction of both sides, Cervical spondylosis, Arthritis of left acromioclavicular joint, Chronic left shoulder pain, Chronic neck pain, Chronic bilateral low back pain without sciatica, Myalgia, and Greater trochanteric bursitis of both hips were pertinent to this visit.     Procedures:  10/11/2022 bilateral sacroiliac joint injection 30% improved  Post procedure    Bilateral low back pain 6 out of 10 intensity chronic, present for greater than 6 months, failed conservative treatments with provider driven home exercise program including the past 3 months.  Nonradiating.    She also has a separate pain in the left lateral hip worse with hip abduction and worse with laying on the left side.  Mild in intensity.    She does have intermittent mild left-sided neck pain and left-sided shoulder pain which are 3 out of 10 in intensity.      Medications tried include NSAIDs, acetaminophen, gabapentin for pain and for muscle relaxer, Meloxicam, oxycodone, tramadol     ROS Red Flags :   Fever, Chills, Sweats: Denies  Involuntary Weight Loss: Denies  Bowel/Bladder Incontinence: Denies  Saddle Anesthesia: Denies        PMHx:   Past Medical History:   Diagnosis Date    Arthritis     back    Bowel habit changes     IBS for years    Diabetes     Dyslipidemia     GERD (gastroesophageal reflux disease)     Heart burn     High cholesterol     HTN (hypertension)     Hypertension     Lower back pain     chronic    Vitamin deficiency        PSHx:   Past Surgical History:   Procedure Laterality Date    NC  INJECTION,SACROILIAC JOINT Bilateral 10/11/2022    Procedure: BILATERAL sacroiliac joint injection with fluoroscopic guidance;  Surgeon: Luis Manuel Coronado M.D.;  Location: [facility];  Service: Pain Management    OH NEUROPLASTY & OR TRANSPOS MEDIAN NRV CARPAL MICHELLE Left 2/15/2022    Procedure: LEFT OPEN CARPAL TUNNEL RELEASE;  Surgeon: Mariusz Corrigan M.D.;  Location: SURGERY SAME DAY [facility];  Service: Orthopedics    OH SHLDR ARTHROSCOP,PART ACROMIOPLAS Right 9/22/2020    Procedure: DECOMPRESSION, SHOULDER, ARTHROSCOPIC - SUBACROMIAL, LABRAL DEBRIDEMENT;  Surgeon: Toribio Coronado M.D.;  Location: SURGERY [facility];  Service: Orthopedics    PB ARTHROSCOPY SHOULDER SURGICAL BICEPS TENODES* Right 9/22/2020    Procedure: ARTHROSCOPY, SHOULDER, WITH BICEPS TENODESIS;  Surgeon: Toribio Coronado M.D.;  Location: SURGERY [facility];  Service: Orthopedics    OTHER ORTHOPEDIC SURGERY  2017    left carpal tunnel release    CHOLECYSTECTOMY      TONSILLECTOMY      TUBAL COAGULATION LAPAROSCOPIC BILATERAL         Family history   Family History   Problem Relation Age of Onset    Hypertension Mother     Diabetes Mother          Medications:   Outpatient Medications Marked as Taking for the 4/26/23 encounter (Office Visit) with Luis Manuel Coronado M.D.   Medication Sig Dispense Refill    cefdinir (OMNICEF) 300 MG Cap Take 1 Capsule by mouth 2 times a day. 14 Capsule 0    Topiramate ER (TROKENDI XR) 200 MG CAPSULE SR 24 HR Take 0.9091 Capsule 24 hour sustained release by mouth every day. 90 Capsule 3    atorvastatin (LIPITOR) 40 MG Tab Take 40 mg by mouth every day.      famotidine (PEPCID) 40 MG Tab Take 40 mg by mouth every day.      Empagliflozin (JARDIANCE) 10 MG Tab tablet Take 10 mg by mouth every day.      Galcanezumab-gnlm (EMGALITY) 120 MG/ML Solution Auto-injector INJECT 1 PEN SUBCUTANEOUSLY AS INSTRUCTED EVERY 4 WEEKS (Patient taking differently: Inject 120 mg under the skin every 4 weeks.  INJECT 1 PEN SUBCUTANEOUSLY AS INSTRUCTED EVERY 4 WEEKS) 3 mL 0    Ubrogepant (UBRELVY) 100 MG Tab Take 1 Tablet by mouth as needed (1 tab at headache osnet, repeat in 2 hour prn). 8 Tablet 0    pantoprazole (PROTONIX) 40 MG Tablet Delayed Response Take 1 Tablet by mouth every day.      TRUE METRIX BLOOD GLUCOSE TEST strip USE AS DIRECTED 3-4 TIMES A DAY      LEVEMIR FLEXTOUCH 100 UNIT/ML injection PEN Inject 26 Units under the skin every evening.      colestipol (COLESTID) 1 GM Tab Take 1 g by mouth every day.      [employer] Lancets Ultra-Thin 30G Misc AS DIRECTED FOUR TIMES DAILY CHECK SUGARS 30 DAYS      metformin (GLUCOPHAGE) 1000 MG tablet Take 1,000 mg by mouth 2 times a day.      traZODone (DESYREL) 50 MG Tab Take  mg by mouth every evening. 1-2 tablets      gabapentin (NEURONTIN) 800 MG tablet Take 1 Tab by mouth 3 times a day. 90 Tab 0    Blood Glucose Monitoring Suppl (TRUE METRIX AIR GLUCOSE METER) Device 1 Each by Does not apply route 2 Times a Day. 1 Device 0    Misc. Devices Misc DM lancets Dispense brand covered by patients insurance Testing frequency: Twice dailyDx DM w/o complication. E11.9 100 Each 3    Misc. Devices Misc 1. Glucometer #1   2. Test strips and lancets to test blood sugars qid     #120 120 Each 6    lisinopril (PRINIVIL) 20 MG Tab Take 1 Tab by mouth every day. 30 Tab 11    Misc. Devices MISC Needles for solostar lantus use as directed 120 Each 6        Current Outpatient Medications on File Prior to Visit   Medication Sig Dispense Refill    cefdinir (OMNICEF) 300 MG Cap Take 1 Capsule by mouth 2 times a day. 14 Capsule 0    Topiramate ER (TROKENDI XR) 200 MG CAPSULE SR 24 HR Take 0.9091 Capsule 24 hour sustained release by mouth every day. 90 Capsule 3    atorvastatin (LIPITOR) 40 MG Tab Take 40 mg by mouth every day.      famotidine (PEPCID) 40 MG Tab Take 40 mg by mouth every day.      Empagliflozin (JARDIANCE) 10 MG Tab tablet Take 10 mg by mouth every day.      Galcanezumab-[facility]  (EMGALITY) 120 MG/ML Solution Auto-injector INJECT 1 PEN SUBCUTANEOUSLY AS INSTRUCTED EVERY 4 WEEKS (Patient taking differently: Inject 120 mg under the skin every 4 weeks. INJECT 1 PEN SUBCUTANEOUSLY AS INSTRUCTED EVERY 4 WEEKS) 3 mL 0    Ubrogepant (UBRELVY) 100 MG Tab Take 1 Tablet by mouth as needed (1 tab at headache osnet, repeat in 2 hour prn). 8 Tablet 0    pantoprazole (PROTONIX) 40 MG Tablet Delayed Response Take 1 Tablet by mouth every day.      TRUE METRIX BLOOD GLUCOSE TEST strip USE AS DIRECTED 3-4 TIMES A DAY      LEVEMIR FLEXTOUCH 100 UNIT/ML injection PEN Inject 26 Units under the skin every evening.      colestipol (COLESTID) 1 GM Tab Take 1 g by mouth every day.      [employer] Lancets Ultra-Thin 30G Misc AS DIRECTED FOUR TIMES DAILY CHECK SUGARS 30 DAYS      metformin (GLUCOPHAGE) 1000 MG tablet Take 1,000 mg by mouth 2 times a day.      traZODone (DESYREL) 50 MG Tab Take  mg by mouth every evening. 1-2 tablets      gabapentin (NEURONTIN) 800 MG tablet Take 1 Tab by mouth 3 times a day. 90 Tab 0    Blood Glucose Monitoring Suppl (TRUE METRIX AIR GLUCOSE METER) Device 1 Each by Does not apply route 2 Times a Day. 1 Device 0    Misc. Devices Misc DM lancets Dispense brand covered by patients insurance Testing frequency: Twice dailyDx DM w/o complication. E11.9 100 Each 3    Misc. Devices Misc 1. Glucometer #1   2. Test strips and lancets to test blood sugars qid     #120 120 Each 6    lisinopril (PRINIVIL) 20 MG Tab Take 1 Tab by mouth every day. 30 Tab 11    Misc. Devices MISC Needles for solostar lantus use as directed 120 Each 6     No current facility-administered medications on file prior to visit.         Allergies:   Allergies   Allergen Reactions    Nkda [No Known Drug Allergy]        Social Hx:   Social History     Socioeconomic History    Marital status:      Spouse name: Not on file    Number of children: Not on file    Years of education: Not on file    Highest education level:  "Not on file   Occupational History    Not on file   Tobacco Use    Smoking status: Never    Smokeless tobacco: Never   Vaping Use    Vaping Use: Never used   Substance and Sexual Activity    Alcohol use: No    Drug use: No    Sexual activity: Not on file   Other Topics Concern    Not on file   Social History Narrative    Not on file     Social Determinants of Health     Financial Resource Strain: Not on file   Food Insecurity: Not on file   Transportation Needs: Not on file   Physical Activity: Not on file   Stress: Not on file   Social Connections: Not on file   Intimate Partner Violence: Not on file   Housing Stability: Not on file         EXAMINATION     Physical Exam:   Vitals: /76 (BP Location: Right arm, Patient Position: Sitting, BP Cuff Size: Adult)   Pulse 76   Temp 36.6 °C (97.9 °F) (Temporal)   Ht 1.499 m (4' 11\")   Wt 68.5 kg (151 lb 0.2 oz)   SpO2 93%     Constitutional:   Body Habitus: Body mass index is 30.5 kg/m².  Cooperation: Fully cooperates with exam  Appearance: Well-groomed no disheveled    Respiratory-  breathing comfortable on room air, no audible wheezing  Cardiovascular- capillary refills less than 2 seconds. No lower extremity edema is noted.   Psychiatric- alert and oriented ×3. Normal affect.    MSK and Neuro: -    Strength is 5 out of 5 in the bilateral upper and bilateral lower extremities.  Sensations intact.  Limited range of motion lumbar spine especially with lumbar extension.  Extension rotation maneuvers positive for reproducing axial low back pain.  No areas of high-grade tenderness palpation over any bony prominence in the lumbar spine or thoracic spine or cervical spine.      MEDICAL DECISION MAKING    DATA    Labs:   Lab Results   Component Value Date/Time    SODIUM 136 03/17/2023 12:44 PM    POTASSIUM 5.6 (H) 03/17/2023 12:44 PM    CHLORIDE 107 03/17/2023 12:44 PM    CO2 15 (L) 03/17/2023 12:44 PM    GLUCOSE 229 (H) 03/17/2023 12:44 PM    BUN 62 (H) 03/17/2023 " 12:44 PM    CREATININE 1.50 (H) 03/17/2023 12:44 PM    CREATININE 0.6 05/11/2009 08:44 AM    BUNCREATRAT 22.0 03/23/2022 03:54 PM        Lab Results   Component Value Date/Time    PROTHROMBTM 13.3 07/20/2012 01:52 PM    INR 1.00 07/20/2012 01:52 PM        Lab Results   Component Value Date/Time    WBC 6.3 03/17/2023 12:44 PM    RBC 4.38 03/17/2023 12:44 PM    HEMOGLOBIN 11.2 (L) 03/17/2023 12:44 PM    HEMATOCRIT 37.1 03/17/2023 12:44 PM    MCV 84.7 03/17/2023 12:44 PM    MCH 25.6 (L) 03/17/2023 12:44 PM    MCHC 30.2 (L) 03/17/2023 12:44 PM    MPV 9.9 03/17/2023 12:44 PM    NEUTSPOLYS 74.60 (H) 03/17/2023 12:44 PM    LYMPHOCYTES 18.10 (L) 03/17/2023 12:44 PM    MONOCYTES 4.60 03/17/2023 12:44 PM    EOSINOPHILS 1.60 03/17/2023 12:44 PM    BASOPHILS 0.60 03/17/2023 12:44 PM    HYPOCHROMIA 1+ 02/18/2014 09:01 AM    ANISOCYTOSIS 1+ 07/20/2012 01:09 PM        Lab Results   Component Value Date/Time    HBA1C 7.6 (H) 02/11/2022 01:08 PM          Imaging:   I personally reviewed following images    MRI lumbar spine 8/31/2021  Multilevel degenerative changes with degenerative disc disease.  Type I Modic changes at L5-S1.  Facet arthropathy worst bilaterally at L4-5 and L5-S1.  No areas of high-grade central canal or neuroforaminal stenosis.        I reviewed the following radiology reports          Results for orders placed during the hospital encounter of 01/19/22    MR-BRAIN-W/O    Impression  No acute intracranial process.    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 This most  likely represents gliosis from previous insult. The cleft does not appear to be lined by cortex, however if there is concern for schizencephaly (which can cause seizures) consider 3-D FSPGR/SPACE T1 imaging to assess whether there is a gray matter  lining  to the margins of this cleft.             Results for orders placed in visit on 08/31/21    MR-CERVICAL SPINE-W/O    Impression  Mild multilevel degenerative disc disease. No significant central canal or neural foraminal narrowing.      Results for orders placed in visit on 08/31/21    MR-LUMBAR SPINE-W/O    Impression  Mild multilevel degenerative disc disease and facet degeneration. No significant central canal or neural foraminal narrowing.      Results for orders placed in visit on 08/31/21    MR-THORACIC SPINE-W/O    Impression  1.  Mild degenerative disc disease. No significant central canal or neural foraminal narrowing.    2.  Minimal chronic superior endplate compression fracture of T10. No significant posterior retropulsion.    Results for orders placed in visit on 08/31/21    MR-LUMBAR SPINE-W/O    Impression  Mild multilevel degenerative disc disease and facet degeneration. No significant central canal or neural foraminal narrowing.                                                                       Results for orders placed during the hospital encounter of 07/20/12    CT-ABDOMEN-PELVIS W/ IV Contrast (R/O acute, uncomplicated appendicitis or diverticulitis, bowel ischemia) FOR HIGH BMI PATIENTS    Addendum 7/20/2012  3:22 PM  Gallbladder is not identified with certainty and may be markedly contracted.    Impression  1. Nondistended segments of the appendix as evidence against acute appendicitis.  2. No evidence of bowel obstruction or free fluid.  3. Low-attenuation liver consistent with fatty change and splenomegaly.  No ascites.  4. Bilateral renal scarring.  No hydronephrosis.  5. 2.6-cm right ovarian cyst.  6. Right adnexal clip.  Possible left clip migration into the anterior abdominal cavity.                     Results for orders placed during the hospital encounter of 08/11/21    DX-CERVICAL SPINE-4+ VIEWS    Impression  Degenerative changes of the cervical spine without acute osseous  abnormality.              Results for orders placed in visit on 11/10/21    DX-HAND 3+ RIGHT            Results for orders placed during the hospital encounter of 10/27/13    DX-LUMBAR SPINE-2 OR 3 VIEWS    Impression  Negative lumbar spine series.        INTERPRETING LOCATION: [address] , [facility], [address]   Results for orders placed during the hospital encounter of 21    DX-LUMBAR SPINE-4+ VIEWS    Impression  1.  Grossly stable multilevel degenerative changes of the lumbar spine.  2.  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 This can be a pain generator.              Results for orders placed during the hospital encounter of 10/27/13    DX-THORACIC SPINE-2 VIEWS    Impression  1. No acute appearing compression fracture.    2. Minimal potential or old compression of the anterior aspect of the superior endplate of T10.    3. Thoracolumbar junction scoliosis convex left.            INTERPRETING LOCATION: [address] , [facility], [address]            DIAGNOSIS   Visit Diagnoses     ICD-10-CM   1. Lumbar spondylosis  M47.816   2. Lumbar degenerative disc disease  M51.36   3. Sacroiliac joint dysfunction of both sides  M53.3   4. Cervical spondylosis  M47.812   5. Arthritis of left acromioclavicular joint  M19.012   6. Chronic left shoulder pain  M25.512    G89.29   7. Chronic neck pain  M54.2    G89.29   8. Chronic bilateral low back pain without sciatica  M54.50    G89.29   9. Myalgia  M79.10   10. Greater trochanteric bursitis of both hips  M70.61    M70.62         ASSESSMENT and PLAN:     Meggan Gertrudis BELL 1970 female      Meggan was seen today for follow-up.    Diagnoses and all orders for this visit:    Lumbar spondylosis  -     Referral to Physical Therapy  -     Referral to Physical Medicine Rehab  -     Referral to Physical Medicine Rehab    Lumbar degenerative disc disease  -     Referral to Physical Therapy    Sacroiliac joint dysfunction of both  sides  -     Referral to Physical Therapy    Cervical spondylosis  -     Referral to Physical Therapy    Arthritis of left acromioclavicular joint  -     Referral to Physical Therapy    Chronic left shoulder pain  -     Referral to Physical Therapy    Chronic neck pain  -     Referral to Physical Therapy    Chronic bilateral low back pain without sciatica  -     Referral to Physical Therapy    Myalgia  -     Referral to Physical Therapy    Greater trochanteric bursitis of both hips  -     Referral to Physical Therapy           The patient is failed conservative treatments with medication management, home exercise program from the direction of physical therapy/physician including the past 6 months.  I have ordered a BILATERAL diagnostic medial branch block targeting the L4-5 and L5-S1 facet joints #1 and #2.  Procedure #2 is only to be done if #1 is a positive block.    The risks benefits and alternatives to this procedure were discussed and the patient wishes to proceed with the procedure. Risks include but are not limited to damage to surrounding structures, infection, bleeding, worsening of pain which can be permanent, weakness which can be permanent. Benefits include pain relief, improved function. Alternatives includes not doing the procedure.   If there are 2 positive blocks I would consider the patient for radiofrequency neurotomy of the previously blocked nerves.    Medications: Continue gabapentin    Follow up: After the above diagnostic procedures    Thank you for allowing me to participate in the care of this patient. If you have any questions please not hesitate to contact me.             Please note that this dictation was created using voice recognition software. I have made every reasonable attempt to correct obvious errors but there may be errors of grammar and content that I may have overlooked prior to finalization of this note.      Luis Manuel Coronado MD  Interventional Spine and Sports  Physiatry  Physical Medicine and Rehabilitation  [facility]

## 2023-04-26 NOTE — H&P (VIEW-ONLY)
Follow up patient note  Interventional spine and Pain  Physiatry (physical medicine and  Rehabilitation)       Chief complaint:   Chief Complaint   Patient presents with    Follow-Up     3m fv          HISTORY    Please see new patient note by Dr Coronado,  for more details.     HPI  Patient identification: Meggan Espinoza ,  1970,   With Diagnoses of Lumbar spondylosis, Lumbar degenerative disc disease, Sacroiliac joint dysfunction of both sides, Cervical spondylosis, Arthritis of left acromioclavicular joint, Chronic left shoulder pain, Chronic neck pain, Chronic bilateral low back pain without sciatica, Myalgia, and Greater trochanteric bursitis of both hips were pertinent to this visit.     Procedures:  10/11/2022 bilateral sacroiliac joint injection 30% improved  Post procedure    Bilateral low back pain 6 out of 10 intensity chronic, present for greater than 6 months, failed conservative treatments with provider driven home exercise program including the past 3 months.  Nonradiating.    She also has a separate pain in the left lateral hip worse with hip abduction and worse with laying on the left side.  Mild in intensity.    She does have intermittent mild left-sided neck pain and left-sided shoulder pain which are 3 out of 10 in intensity.      Medications tried include NSAIDs, acetaminophen, gabapentin for pain and for muscle relaxer, Meloxicam, oxycodone, tramadol     ROS Red Flags :   Fever, Chills, Sweats: Denies  Involuntary Weight Loss: Denies  Bowel/Bladder Incontinence: Denies  Saddle Anesthesia: Denies        PMHx:   Past Medical History:   Diagnosis Date    Arthritis     back    Bowel habit changes     IBS for years    Diabetes     Dyslipidemia     GERD (gastroesophageal reflux disease)     Heart burn     High cholesterol     HTN (hypertension)     Hypertension     Lower back pain     chronic    Vitamin deficiency        PSHx:   Past Surgical History:   Procedure Laterality Date    MI  INJECTION,SACROILIAC JOINT Bilateral 10/11/2022    Procedure: BILATERAL sacroiliac joint injection with fluoroscopic guidance;  Surgeon: Luis Manuel Coronado M.D.;  Location[facility];  Service: Pain Management    AR NEUROPLASTY & OR TRANSPOS MEDIAN NRV CARPAL MICHELLE Left 2/15/2022    Procedure: LEFT OPEN CARPAL TUNNEL RELEASE;  Surgeon: Mariusz Corrigan M.D.;  Location: SURGERY SAME DAY [facility];  Service: Orthopedics    AR SHLDR ARTHROSCOP,PART ACROMIOPLAS Right 9/22/2020    Procedure: DECOMPRESSION, SHOULDER, ARTHROSCOPIC - SUBACROMIAL, LABRAL DEBRIDEMENT;  Surgeon: Toribio Coronado M.D.;  Location: SURGERY [facility];  Service: Orthopedics    PB ARTHROSCOPY SHOULDER SURGICAL BICEPS TENODES* Right 9/22/2020    Procedure: ARTHROSCOPY, SHOULDER, WITH BICEPS TENODESIS;  Surgeon: Toribio Coronado M.D.;  Location: SURGERY [facility];  Service: Orthopedics    OTHER ORTHOPEDIC SURGERY  2017    left carpal tunnel release    CHOLECYSTECTOMY      TONSILLECTOMY      TUBAL COAGULATION LAPAROSCOPIC BILATERAL         Family history   Family History   Problem Relation Age of Onset    Hypertension Mother     Diabetes Mother          Medications:   Outpatient Medications Marked as Taking for the 4/26/23 encounter (Office Visit) with Luis Manuel Coronado M.D.   Medication Sig Dispense Refill    cefdinir (OMNICEF) 300 MG Cap Take 1 Capsule by mouth 2 times a day. 14 Capsule 0    Topiramate ER (TROKENDI XR) 200 MG CAPSULE SR 24 HR Take 0.9091 Capsule 24 hour sustained release by mouth every day. 90 Capsule 3    atorvastatin (LIPITOR) 40 MG Tab Take 40 mg by mouth every day.      famotidine (PEPCID) 40 MG Tab Take 40 mg by mouth every day.      Empagliflozin (JARDIANCE) 10 MG Tab tablet Take 10 mg by mouth every day.      Galcanezumab-gnlm (EMGALITY) 120 MG/ML Solution Auto-injector INJECT 1 PEN SUBCUTANEOUSLY AS INSTRUCTED EVERY 4 WEEKS (Patient taking differently: Inject 120 mg under the skin every 4 weeks.  INJECT 1 PEN SUBCUTANEOUSLY AS INSTRUCTED EVERY 4 WEEKS) 3 mL 0    Ubrogepant (UBRELVY) 100 MG Tab Take 1 Tablet by mouth as needed (1 tab at headache osnet, repeat in 2 hour prn). 8 Tablet 0    pantoprazole (PROTONIX) 40 MG Tablet Delayed Response Take 1 Tablet by mouth every day.      TRUE METRIX BLOOD GLUCOSE TEST strip USE AS DIRECTED 3-4 TIMES A DAY      LEVEMIR FLEXTOUCH 100 UNIT/ML injection PEN Inject 26 Units under the skin every evening.      colestipol (COLESTID) 1 GM Tab Take 1 g by mouth every day.      [employer] Lancets Ultra-Thin 30G Misc AS DIRECTED FOUR TIMES DAILY CHECK SUGARS 30 DAYS      metformin (GLUCOPHAGE) 1000 MG tablet Take 1,000 mg by mouth 2 times a day.      traZODone (DESYREL) 50 MG Tab Take  mg by mouth every evening. 1-2 tablets      gabapentin (NEURONTIN) 800 MG tablet Take 1 Tab by mouth 3 times a day. 90 Tab 0    Blood Glucose Monitoring Suppl (TRUE METRIX AIR GLUCOSE METER) Device 1 Each by Does not apply route 2 Times a Day. 1 Device 0    Misc. Devices Misc DM lancets Dispense brand covered by patients insurance Testing frequency: Twice dailyDx DM w/o complication. E11.9 100 Each 3    Misc. Devices Misc 1. Glucometer #1   2. Test strips and lancets to test blood sugars qid     #120 120 Each 6    lisinopril (PRINIVIL) 20 MG Tab Take 1 Tab by mouth every day. 30 Tab 11    Misc. Devices MISC Needles for solostar lantus use as directed 120 Each 6        Current Outpatient Medications on File Prior to Visit   Medication Sig Dispense Refill    cefdinir (OMNICEF) 300 MG Cap Take 1 Capsule by mouth 2 times a day. 14 Capsule 0    Topiramate ER (TROKENDI XR) 200 MG CAPSULE SR 24 HR Take 0.9091 Capsule 24 hour sustained release by mouth every day. 90 Capsule 3    atorvastatin (LIPITOR) 40 MG Tab Take 40 mg by mouth every day.      famotidine (PEPCID) 40 MG Tab Take 40 mg by mouth every day.      Empagliflozin (JARDIANCE) 10 MG Tab tablet Take 10 mg by mouth every day.      Galcanezumab-[facility]  (EMGALITY) 120 MG/ML Solution Auto-injector INJECT 1 PEN SUBCUTANEOUSLY AS INSTRUCTED EVERY 4 WEEKS (Patient taking differently: Inject 120 mg under the skin every 4 weeks. INJECT 1 PEN SUBCUTANEOUSLY AS INSTRUCTED EVERY 4 WEEKS) 3 mL 0    Ubrogepant (UBRELVY) 100 MG Tab Take 1 Tablet by mouth as needed (1 tab at headache osnet, repeat in 2 hour prn). 8 Tablet 0    pantoprazole (PROTONIX) 40 MG Tablet Delayed Response Take 1 Tablet by mouth every day.      TRUE METRIX BLOOD GLUCOSE TEST strip USE AS DIRECTED 3-4 TIMES A DAY      LEVEMIR FLEXTOUCH 100 UNIT/ML injection PEN Inject 26 Units under the skin every evening.      colestipol (COLESTID) 1 GM Tab Take 1 g by mouth every day.      [employer] Lancets Ultra-Thin 30G Misc AS DIRECTED FOUR TIMES DAILY CHECK SUGARS 30 DAYS      metformin (GLUCOPHAGE) 1000 MG tablet Take 1,000 mg by mouth 2 times a day.      traZODone (DESYREL) 50 MG Tab Take  mg by mouth every evening. 1-2 tablets      gabapentin (NEURONTIN) 800 MG tablet Take 1 Tab by mouth 3 times a day. 90 Tab 0    Blood Glucose Monitoring Suppl (TRUE METRIX AIR GLUCOSE METER) Device 1 Each by Does not apply route 2 Times a Day. 1 Device 0    Misc. Devices Misc DM lancets Dispense brand covered by patients insurance Testing frequency: Twice dailyDx DM w/o complication. E11.9 100 Each 3    Misc. Devices Misc 1. Glucometer #1   2. Test strips and lancets to test blood sugars qid     #120 120 Each 6    lisinopril (PRINIVIL) 20 MG Tab Take 1 Tab by mouth every day. 30 Tab 11    Misc. Devices MISC Needles for solostar lantus use as directed 120 Each 6     No current facility-administered medications on file prior to visit.         Allergies:   Allergies   Allergen Reactions    Nkda [No Known Drug Allergy]        Social Hx:   Social History     Socioeconomic History    Marital status:      Spouse name: Not on file    Number of children: Not on file    Years of education: Not on file    Highest education level:  "Not on file   Occupational History    Not on file   Tobacco Use    Smoking status: Never    Smokeless tobacco: Never   Vaping Use    Vaping Use: Never used   Substance and Sexual Activity    Alcohol use: No    Drug use: No    Sexual activity: Not on file   Other Topics Concern    Not on file   Social History Narrative    Not on file     Social Determinants of Health     Financial Resource Strain: Not on file   Food Insecurity: Not on file   Transportation Needs: Not on file   Physical Activity: Not on file   Stress: Not on file   Social Connections: Not on file   Intimate Partner Violence: Not on file   Housing Stability: Not on file         EXAMINATION     Physical Exam:   Vitals: /76 (BP Location: Right arm, Patient Position: Sitting, BP Cuff Size: Adult)   Pulse 76   Temp 36.6 °C (97.9 °F) (Temporal)   Ht 1.499 m (4' 11\")   Wt 68.5 kg (151 lb 0.2 oz)   SpO2 93%     Constitutional:   Body Habitus: Body mass index is 30.5 kg/m².  Cooperation: Fully cooperates with exam  Appearance: Well-groomed no disheveled    Respiratory-  breathing comfortable on room air, no audible wheezing  Cardiovascular- capillary refills less than 2 seconds. No lower extremity edema is noted.   Psychiatric- alert and oriented ×3. Normal affect.    MSK and Neuro: -    Strength is 5 out of 5 in the bilateral upper and bilateral lower extremities.  Sensations intact.  Limited range of motion lumbar spine especially with lumbar extension.  Extension rotation maneuvers positive for reproducing axial low back pain.  No areas of high-grade tenderness palpation over any bony prominence in the lumbar spine or thoracic spine or cervical spine.      MEDICAL DECISION MAKING    DATA    Labs:   Lab Results   Component Value Date/Time    SODIUM 136 03/17/2023 12:44 PM    POTASSIUM 5.6 (H) 03/17/2023 12:44 PM    CHLORIDE 107 03/17/2023 12:44 PM    CO2 15 (L) 03/17/2023 12:44 PM    GLUCOSE 229 (H) 03/17/2023 12:44 PM    BUN 62 (H) 03/17/2023 " 12:44 PM    CREATININE 1.50 (H) 03/17/2023 12:44 PM    CREATININE 0.6 05/11/2009 08:44 AM    BUNCREATRAT 22.0 03/23/2022 03:54 PM        Lab Results   Component Value Date/Time    PROTHROMBTM 13.3 07/20/2012 01:52 PM    INR 1.00 07/20/2012 01:52 PM        Lab Results   Component Value Date/Time    WBC 6.3 03/17/2023 12:44 PM    RBC 4.38 03/17/2023 12:44 PM    HEMOGLOBIN 11.2 (L) 03/17/2023 12:44 PM    HEMATOCRIT 37.1 03/17/2023 12:44 PM    MCV 84.7 03/17/2023 12:44 PM    MCH 25.6 (L) 03/17/2023 12:44 PM    MCHC 30.2 (L) 03/17/2023 12:44 PM    MPV 9.9 03/17/2023 12:44 PM    NEUTSPOLYS 74.60 (H) 03/17/2023 12:44 PM    LYMPHOCYTES 18.10 (L) 03/17/2023 12:44 PM    MONOCYTES 4.60 03/17/2023 12:44 PM    EOSINOPHILS 1.60 03/17/2023 12:44 PM    BASOPHILS 0.60 03/17/2023 12:44 PM    HYPOCHROMIA 1+ 02/18/2014 09:01 AM    ANISOCYTOSIS 1+ 07/20/2012 01:09 PM        Lab Results   Component Value Date/Time    HBA1C 7.6 (H) 02/11/2022 01:08 PM          Imaging:   I personally reviewed following images    MRI lumbar spine 8/31/2021  Multilevel degenerative changes with degenerative disc disease.  Type I Modic changes at L5-S1.  Facet arthropathy worst bilaterally at L4-5 and L5-S1.  No areas of high-grade central canal or neuroforaminal stenosis.        I reviewed the following radiology reports          Results for orders placed during the hospital encounter of 01/19/22    MR-BRAIN-W/O    Impression  No acute intracranial process.    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 This most  likely represents gliosis from previous insult. The cleft does not appear to be lined by cortex, however if there is concern for schizencephaly (which can cause seizures) consider 3-D FSPGR/SPACE T1 imaging to assess whether there is a gray matter  lining  to the margins of this cleft.             Results for orders placed in visit on 08/31/21    MR-CERVICAL SPINE-W/O    Impression  Mild multilevel degenerative disc disease. No significant central canal or neural foraminal narrowing.      Results for orders placed in visit on 08/31/21    MR-LUMBAR SPINE-W/O    Impression  Mild multilevel degenerative disc disease and facet degeneration. No significant central canal or neural foraminal narrowing.      Results for orders placed in visit on 08/31/21    MR-THORACIC SPINE-W/O    Impression  1.  Mild degenerative disc disease. No significant central canal or neural foraminal narrowing.    2.  Minimal chronic superior endplate compression fracture of T10. No significant posterior retropulsion.    Results for orders placed in visit on 08/31/21    MR-LUMBAR SPINE-W/O    Impression  Mild multilevel degenerative disc disease and facet degeneration. No significant central canal or neural foraminal narrowing.                                                                       Results for orders placed during the hospital encounter of 07/20/12    CT-ABDOMEN-PELVIS W/ IV Contrast (R/O acute, uncomplicated appendicitis or diverticulitis, bowel ischemia) FOR HIGH BMI PATIENTS    Addendum 7/20/2012  3:22 PM  Gallbladder is not identified with certainty and may be markedly contracted.    Impression  1. Nondistended segments of the appendix as evidence against acute appendicitis.  2. No evidence of bowel obstruction or free fluid.  3. Low-attenuation liver consistent with fatty change and splenomegaly.  No ascites.  4. Bilateral renal scarring.  No hydronephrosis.  5. 2.6-cm right ovarian cyst.  6. Right adnexal clip.  Possible left clip migration into the anterior abdominal cavity.                     Results for orders placed during the hospital encounter of 08/11/21    DX-CERVICAL SPINE-4+ VIEWS    Impression  Degenerative changes of the cervical spine without acute osseous  abnormality.              Results for orders placed in visit on 11/10/21    DX-HAND 3+ RIGHT            Results for orders placed during the hospital encounter of 10/27/13    DX-LUMBAR SPINE-2 OR 3 VIEWS    Impression  Negative lumbar spine series.        INTERPRETING LOCATION: [address] , [facility], [address]   Results for orders placed during the hospital encounter of 21    DX-LUMBAR SPINE-4+ VIEWS    Impression  1.  Grossly stable multilevel degenerative changes of the lumbar spine.  2.  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 This can be a pain generator.              Results for orders placed during the hospital encounter of 10/27/13    DX-THORACIC SPINE-2 VIEWS    Impression  1. No acute appearing compression fracture.    2. Minimal potential or old compression of the anterior aspect of the superior endplate of T10.    3. Thoracolumbar junction scoliosis convex left.            INTERPRETING LOCATION: [address] , [facility], [address]            DIAGNOSIS   Visit Diagnoses     ICD-10-CM   1. Lumbar spondylosis  M47.816   2. Lumbar degenerative disc disease  M51.36   3. Sacroiliac joint dysfunction of both sides  M53.3   4. Cervical spondylosis  M47.812   5. Arthritis of left acromioclavicular joint  M19.012   6. Chronic left shoulder pain  M25.512    G89.29   7. Chronic neck pain  M54.2    G89.29   8. Chronic bilateral low back pain without sciatica  M54.50    G89.29   9. Myalgia  M79.10   10. Greater trochanteric bursitis of both hips  M70.61    M70.62         ASSESSMENT and PLAN:     Meggan Gertrudis BELL 1970 female      Meggan was seen today for follow-up.    Diagnoses and all orders for this visit:    Lumbar spondylosis  -     Referral to Physical Therapy  -     Referral to Physical Medicine Rehab  -     Referral to Physical Medicine Rehab    Lumbar degenerative disc disease  -     Referral to Physical Therapy    Sacroiliac joint dysfunction of both  sides  -     Referral to Physical Therapy    Cervical spondylosis  -     Referral to Physical Therapy    Arthritis of left acromioclavicular joint  -     Referral to Physical Therapy    Chronic left shoulder pain  -     Referral to Physical Therapy    Chronic neck pain  -     Referral to Physical Therapy    Chronic bilateral low back pain without sciatica  -     Referral to Physical Therapy    Myalgia  -     Referral to Physical Therapy    Greater trochanteric bursitis of both hips  -     Referral to Physical Therapy           The patient is failed conservative treatments with medication management, home exercise program from the direction of physical therapy/physician including the past 6 months.  I have ordered a BILATERAL diagnostic medial branch block targeting the L4-5 and L5-S1 facet joints #1 and #2.  Procedure #2 is only to be done if #1 is a positive block.    The risks benefits and alternatives to this procedure were discussed and the patient wishes to proceed with the procedure. Risks include but are not limited to damage to surrounding structures, infection, bleeding, worsening of pain which can be permanent, weakness which can be permanent. Benefits include pain relief, improved function. Alternatives includes not doing the procedure.   If there are 2 positive blocks I would consider the patient for radiofrequency neurotomy of the previously blocked nerves.    Medications: Continue gabapentin    Follow up: After the above diagnostic procedures    Thank you for allowing me to participate in the care of this patient. If you have any questions please not hesitate to contact me.             Please note that this dictation was created using voice recognition software. I have made every reasonable attempt to correct obvious errors but there may be errors of grammar and content that I may have overlooked prior to finalization of this note.      Luis Manuel Coronado MD  Interventional Spine and Sports  Physiatry  Physical Medicine and Rehabilitation  [facility]

## 2023-04-26 NOTE — H&P (VIEW-ONLY)
Follow up patient note  Interventional spine and Pain  Physiatry (physical medicine and  Rehabilitation)       Chief complaint:   Chief Complaint   Patient presents with    Follow-Up     3m fv          HISTORY    Please see new patient note by Dr Coronado,  for more details.     HPI  Patient identification: Meggan Espinoza ,  1970,   With Diagnoses of Lumbar spondylosis, Lumbar degenerative disc disease, Sacroiliac joint dysfunction of both sides, Cervical spondylosis, Arthritis of left acromioclavicular joint, Chronic left shoulder pain, Chronic neck pain, Chronic bilateral low back pain without sciatica, Myalgia, and Greater trochanteric bursitis of both hips were pertinent to this visit.     Procedures:  10/11/2022 bilateral sacroiliac joint injection 30% improved  Post procedure    Bilateral low back pain 6 out of 10 intensity chronic, present for greater than 6 months, failed conservative treatments with provider driven home exercise program including the past 3 months.  Nonradiating.    She also has a separate pain in the left lateral hip worse with hip abduction and worse with laying on the left side.  Mild in intensity.    She does have intermittent mild left-sided neck pain and left-sided shoulder pain which are 3 out of 10 in intensity.      Medications tried include NSAIDs, acetaminophen, gabapentin for pain and for muscle relaxer, Meloxicam, oxycodone, tramadol     ROS Red Flags :   Fever, Chills, Sweats: Denies  Involuntary Weight Loss: Denies  Bowel/Bladder Incontinence: Denies  Saddle Anesthesia: Denies        PMHx:   Past Medical History:   Diagnosis Date    Arthritis     back    Bowel habit changes     IBS for years    Diabetes     Dyslipidemia     GERD (gastroesophageal reflux disease)     Heart burn     High cholesterol     HTN (hypertension)     Hypertension     Lower back pain     chronic    Vitamin deficiency        PSHx:   Past Surgical History:   Procedure Laterality Date    ID  INJECTION,SACROILIAC JOINT Bilateral 10/11/2022    Procedure: BILATERAL sacroiliac joint injection with fluoroscopic guidance;  Surgeon: Luis Manuel Coronado M.D.;  Location: [facility];  Service: Pain Management    MA NEUROPLASTY & OR TRANSPOS MEDIAN NRV CARPAL MICHELLE Left 2/15/2022    Procedure: LEFT OPEN CARPAL TUNNEL RELEASE;  Surgeon: Mariusz Corrigan M.D.;  Location: SURGERY SAME DAY [facility];  Service: Orthopedics    MA SHLDR ARTHROSCOP,PART ACROMIOPLAS Right 9/22/2020    Procedure: DECOMPRESSION, SHOULDER, ARTHROSCOPIC - SUBACROMIAL, LABRAL DEBRIDEMENT;  Surgeon: Toribio Coronado M.D.;  Location: SURGERY [facility];  Service: Orthopedics    PB ARTHROSCOPY SHOULDER SURGICAL BICEPS TENODES* Right 9/22/2020    Procedure: ARTHROSCOPY, SHOULDER, WITH BICEPS TENODESIS;  Surgeon: Toribio Coronado M.D.;  Location: SURGERY [facility];  Service: Orthopedics    OTHER ORTHOPEDIC SURGERY  2017    left carpal tunnel release    CHOLECYSTECTOMY      TONSILLECTOMY      TUBAL COAGULATION LAPAROSCOPIC BILATERAL         Family history   Family History   Problem Relation Age of Onset    Hypertension Mother     Diabetes Mother          Medications:   Outpatient Medications Marked as Taking for the 4/26/23 encounter (Office Visit) with Luis Manuel Coronado M.D.   Medication Sig Dispense Refill    cefdinir (OMNICEF) 300 MG Cap Take 1 Capsule by mouth 2 times a day. 14 Capsule 0    Topiramate ER (TROKENDI XR) 200 MG CAPSULE SR 24 HR Take 0.9091 Capsule 24 hour sustained release by mouth every day. 90 Capsule 3    atorvastatin (LIPITOR) 40 MG Tab Take 40 mg by mouth every day.      famotidine (PEPCID) 40 MG Tab Take 40 mg by mouth every day.      Empagliflozin (JARDIANCE) 10 MG Tab tablet Take 10 mg by mouth every day.      Galcanezumab-gnlm (EMGALITY) 120 MG/ML Solution Auto-injector INJECT 1 PEN SUBCUTANEOUSLY AS INSTRUCTED EVERY 4 WEEKS (Patient taking differently: Inject 120 mg under the skin every 4 weeks.  INJECT 1 PEN SUBCUTANEOUSLY AS INSTRUCTED EVERY 4 WEEKS) 3 mL 0    Ubrogepant (UBRELVY) 100 MG Tab Take 1 Tablet by mouth as needed (1 tab at headache osnet, repeat in 2 hour prn). 8 Tablet 0    pantoprazole (PROTONIX) 40 MG Tablet Delayed Response Take 1 Tablet by mouth every day.      TRUE METRIX BLOOD GLUCOSE TEST strip USE AS DIRECTED 3-4 TIMES A DAY      LEVEMIR FLEXTOUCH 100 UNIT/ML injection PEN Inject 26 Units under the skin every evening.      colestipol (COLESTID) 1 GM Tab Take 1 g by mouth every day.      [employer] Lancets Ultra-Thin 30G Misc AS DIRECTED FOUR TIMES DAILY CHECK SUGARS 30 DAYS      metformin (GLUCOPHAGE) 1000 MG tablet Take 1,000 mg by mouth 2 times a day.      traZODone (DESYREL) 50 MG Tab Take  mg by mouth every evening. 1-2 tablets      gabapentin (NEURONTIN) 800 MG tablet Take 1 Tab by mouth 3 times a day. 90 Tab 0    Blood Glucose Monitoring Suppl (TRUE METRIX AIR GLUCOSE METER) Device 1 Each by Does not apply route 2 Times a Day. 1 Device 0    Misc. Devices Misc DM lancets Dispense brand covered by patients insurance Testing frequency: Twice dailyDx DM w/o complication. E11.9 100 Each 3    Misc. Devices Misc 1. Glucometer #1   2. Test strips and lancets to test blood sugars qid     #120 120 Each 6    lisinopril (PRINIVIL) 20 MG Tab Take 1 Tab by mouth every day. 30 Tab 11    Misc. Devices MISC Needles for solostar lantus use as directed 120 Each 6        Current Outpatient Medications on File Prior to Visit   Medication Sig Dispense Refill    cefdinir (OMNICEF) 300 MG Cap Take 1 Capsule by mouth 2 times a day. 14 Capsule 0    Topiramate ER (TROKENDI XR) 200 MG CAPSULE SR 24 HR Take 0.9091 Capsule 24 hour sustained release by mouth every day. 90 Capsule 3    atorvastatin (LIPITOR) 40 MG Tab Take 40 mg by mouth every day.      famotidine (PEPCID) 40 MG Tab Take 40 mg by mouth every day.      Empagliflozin (JARDIANCE) 10 MG Tab tablet Take 10 mg by mouth every day.      Galcanezumab-[facility]  (EMGALITY) 120 MG/ML Solution Auto-injector INJECT 1 PEN SUBCUTANEOUSLY AS INSTRUCTED EVERY 4 WEEKS (Patient taking differently: Inject 120 mg under the skin every 4 weeks. INJECT 1 PEN SUBCUTANEOUSLY AS INSTRUCTED EVERY 4 WEEKS) 3 mL 0    Ubrogepant (UBRELVY) 100 MG Tab Take 1 Tablet by mouth as needed (1 tab at headache osnet, repeat in 2 hour prn). 8 Tablet 0    pantoprazole (PROTONIX) 40 MG Tablet Delayed Response Take 1 Tablet by mouth every day.      TRUE METRIX BLOOD GLUCOSE TEST strip USE AS DIRECTED 3-4 TIMES A DAY      LEVEMIR FLEXTOUCH 100 UNIT/ML injection PEN Inject 26 Units under the skin every evening.      colestipol (COLESTID) 1 GM Tab Take 1 g by mouth every day.      [employer] Lancets Ultra-Thin 30G Misc AS DIRECTED FOUR TIMES DAILY CHECK SUGARS 30 DAYS      metformin (GLUCOPHAGE) 1000 MG tablet Take 1,000 mg by mouth 2 times a day.      traZODone (DESYREL) 50 MG Tab Take  mg by mouth every evening. 1-2 tablets      gabapentin (NEURONTIN) 800 MG tablet Take 1 Tab by mouth 3 times a day. 90 Tab 0    Blood Glucose Monitoring Suppl (TRUE METRIX AIR GLUCOSE METER) Device 1 Each by Does not apply route 2 Times a Day. 1 Device 0    Misc. Devices Misc DM lancets Dispense brand covered by patients insurance Testing frequency: Twice dailyDx DM w/o complication. E11.9 100 Each 3    Misc. Devices Misc 1. Glucometer #1   2. Test strips and lancets to test blood sugars qid     #120 120 Each 6    lisinopril (PRINIVIL) 20 MG Tab Take 1 Tab by mouth every day. 30 Tab 11    Misc. Devices MISC Needles for solostar lantus use as directed 120 Each 6     No current facility-administered medications on file prior to visit.         Allergies:   Allergies   Allergen Reactions    Nkda [No Known Drug Allergy]        Social Hx:   Social History     Socioeconomic History    Marital status:      Spouse name: Not on file    Number of children: Not on file    Years of education: Not on file    Highest education level:  "Not on file   Occupational History    Not on file   Tobacco Use    Smoking status: Never    Smokeless tobacco: Never   Vaping Use    Vaping Use: Never used   Substance and Sexual Activity    Alcohol use: No    Drug use: No    Sexual activity: Not on file   Other Topics Concern    Not on file   Social History Narrative    Not on file     Social Determinants of Health     Financial Resource Strain: Not on file   Food Insecurity: Not on file   Transportation Needs: Not on file   Physical Activity: Not on file   Stress: Not on file   Social Connections: Not on file   Intimate Partner Violence: Not on file   Housing Stability: Not on file         EXAMINATION     Physical Exam:   Vitals: /76 (BP Location: Right arm, Patient Position: Sitting, BP Cuff Size: Adult)   Pulse 76   Temp 36.6 °C (97.9 °F) (Temporal)   Ht 1.499 m (4' 11\")   Wt 68.5 kg (151 lb 0.2 oz)   SpO2 93%     Constitutional:   Body Habitus: Body mass index is 30.5 kg/m².  Cooperation: Fully cooperates with exam  Appearance: Well-groomed no disheveled    Respiratory-  breathing comfortable on room air, no audible wheezing  Cardiovascular- capillary refills less than 2 seconds. No lower extremity edema is noted.   Psychiatric- alert and oriented ×3. Normal affect.    MSK and Neuro: -    Strength is 5 out of 5 in the bilateral upper and bilateral lower extremities.  Sensations intact.  Limited range of motion lumbar spine especially with lumbar extension.  Extension rotation maneuvers positive for reproducing axial low back pain.  No areas of high-grade tenderness palpation over any bony prominence in the lumbar spine or thoracic spine or cervical spine.      MEDICAL DECISION MAKING    DATA    Labs:   Lab Results   Component Value Date/Time    SODIUM 136 03/17/2023 12:44 PM    POTASSIUM 5.6 (H) 03/17/2023 12:44 PM    CHLORIDE 107 03/17/2023 12:44 PM    CO2 15 (L) 03/17/2023 12:44 PM    GLUCOSE 229 (H) 03/17/2023 12:44 PM    BUN 62 (H) 03/17/2023 " 12:44 PM    CREATININE 1.50 (H) 03/17/2023 12:44 PM    CREATININE 0.6 05/11/2009 08:44 AM    BUNCREATRAT 22.0 03/23/2022 03:54 PM        Lab Results   Component Value Date/Time    PROTHROMBTM 13.3 07/20/2012 01:52 PM    INR 1.00 07/20/2012 01:52 PM        Lab Results   Component Value Date/Time    WBC 6.3 03/17/2023 12:44 PM    RBC 4.38 03/17/2023 12:44 PM    HEMOGLOBIN 11.2 (L) 03/17/2023 12:44 PM    HEMATOCRIT 37.1 03/17/2023 12:44 PM    MCV 84.7 03/17/2023 12:44 PM    MCH 25.6 (L) 03/17/2023 12:44 PM    MCHC 30.2 (L) 03/17/2023 12:44 PM    MPV 9.9 03/17/2023 12:44 PM    NEUTSPOLYS 74.60 (H) 03/17/2023 12:44 PM    LYMPHOCYTES 18.10 (L) 03/17/2023 12:44 PM    MONOCYTES 4.60 03/17/2023 12:44 PM    EOSINOPHILS 1.60 03/17/2023 12:44 PM    BASOPHILS 0.60 03/17/2023 12:44 PM    HYPOCHROMIA 1+ 02/18/2014 09:01 AM    ANISOCYTOSIS 1+ 07/20/2012 01:09 PM        Lab Results   Component Value Date/Time    HBA1C 7.6 (H) 02/11/2022 01:08 PM          Imaging:   I personally reviewed following images    MRI lumbar spine 8/31/2021  Multilevel degenerative changes with degenerative disc disease.  Type I Modic changes at L5-S1.  Facet arthropathy worst bilaterally at L4-5 and L5-S1.  No areas of high-grade central canal or neuroforaminal stenosis.        I reviewed the following radiology reports          Results for orders placed during the hospital encounter of 01/19/22    MR-BRAIN-W/O    Impression  No acute intracranial process.    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 This most  likely represents gliosis from previous insult. The cleft does not appear to be lined by cortex, however if there is concern for schizencephaly (which can cause seizures) consider 3-D FSPGR/SPACE T1 imaging to assess whether there is a gray matter  lining  to the margins of this cleft.             Results for orders placed in visit on 08/31/21    MR-CERVICAL SPINE-W/O    Impression  Mild multilevel degenerative disc disease. No significant central canal or neural foraminal narrowing.      Results for orders placed in visit on 08/31/21    MR-LUMBAR SPINE-W/O    Impression  Mild multilevel degenerative disc disease and facet degeneration. No significant central canal or neural foraminal narrowing.      Results for orders placed in visit on 08/31/21    MR-THORACIC SPINE-W/O    Impression  1.  Mild degenerative disc disease. No significant central canal or neural foraminal narrowing.    2.  Minimal chronic superior endplate compression fracture of T10. No significant posterior retropulsion.    Results for orders placed in visit on 08/31/21    MR-LUMBAR SPINE-W/O    Impression  Mild multilevel degenerative disc disease and facet degeneration. No significant central canal or neural foraminal narrowing.                                                                       Results for orders placed during the hospital encounter of 07/20/12    CT-ABDOMEN-PELVIS W/ IV Contrast (R/O acute, uncomplicated appendicitis or diverticulitis, bowel ischemia) FOR HIGH BMI PATIENTS    Addendum 7/20/2012  3:22 PM  Gallbladder is not identified with certainty and may be markedly contracted.    Impression  1. Nondistended segments of the appendix as evidence against acute appendicitis.  2. No evidence of bowel obstruction or free fluid.  3. Low-attenuation liver consistent with fatty change and splenomegaly.  No ascites.  4. Bilateral renal scarring.  No hydronephrosis.  5. 2.6-cm right ovarian cyst.  6. Right adnexal clip.  Possible left clip migration into the anterior abdominal cavity.                     Results for orders placed during the hospital encounter of 08/11/21    DX-CERVICAL SPINE-4+ VIEWS    Impression  Degenerative changes of the cervical spine without acute osseous  abnormality.              Results for orders placed in visit on 11/10/21    DX-HAND 3+ RIGHT            Results for orders placed during the hospital encounter of 10/27/13    DX-LUMBAR SPINE-2 OR 3 VIEWS    Impression  Negative lumbar spine series.        INTERPRETING LOCATION: [address] , [facility], [id number]   Results for orders placed during the hospital encounter of 21    DX-LUMBAR SPINE-4+ VIEWS    Impression  1.  Grossly stable multilevel degenerative changes of the lumbar spine.  2.  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 This can be a pain generator.              Results for orders placed during the hospital encounter of 10/27/13    DX-THORACIC SPINE-2 VIEWS    Impression  1. No acute appearing compression fracture.    2. Minimal potential or old compression of the anterior aspect of the superior endplate of T10.    3. Thoracolumbar junction scoliosis convex left.            INTERPRETING LOCATION: [address] , [facility], [id number]            DIAGNOSIS   Visit Diagnoses     ICD-10-CM   1. Lumbar spondylosis  M47.816   2. Lumbar degenerative disc disease  M51.36   3. Sacroiliac joint dysfunction of both sides  M53.3   4. Cervical spondylosis  M47.812   5. Arthritis of left acromioclavicular joint  M19.012   6. Chronic left shoulder pain  M25.512    G89.29   7. Chronic neck pain  M54.2    G89.29   8. Chronic bilateral low back pain without sciatica  M54.50    G89.29   9. Myalgia  M79.10   10. Greater trochanteric bursitis of both hips  M70.61    M70.62         ASSESSMENT and PLAN:     Meggan Gertrudis BELL 1970 female      Meggan was seen today for follow-up.    Diagnoses and all orders for this visit:    Lumbar spondylosis  -     Referral to Physical Therapy  -     Referral to Physical Medicine Rehab  -     Referral to Physical Medicine Rehab    Lumbar degenerative disc disease  -     Referral to Physical Therapy    Sacroiliac joint dysfunction of both  sides  -     Referral to Physical Therapy    Cervical spondylosis  -     Referral to Physical Therapy    Arthritis of left acromioclavicular joint  -     Referral to Physical Therapy    Chronic left shoulder pain  -     Referral to Physical Therapy    Chronic neck pain  -     Referral to Physical Therapy    Chronic bilateral low back pain without sciatica  -     Referral to Physical Therapy    Myalgia  -     Referral to Physical Therapy    Greater trochanteric bursitis of both hips  -     Referral to Physical Therapy           The patient is failed conservative treatments with medication management, home exercise program from the direction of physical therapy/physician including the past 6 months.  I have ordered a BILATERAL diagnostic medial branch block targeting the L4-5 and L5-S1 facet joints #1 and #2.  Procedure #2 is only to be done if #1 is a positive block.    The risks benefits and alternatives to this procedure were discussed and the patient wishes to proceed with the procedure. Risks include but are not limited to damage to surrounding structures, infection, bleeding, worsening of pain which can be permanent, weakness which can be permanent. Benefits include pain relief, improved function. Alternatives includes not doing the procedure.   If there are 2 positive blocks I would consider the patient for radiofrequency neurotomy of the previously blocked nerves.    Medications: Continue gabapentin    Follow up: After the above diagnostic procedures    Thank you for allowing me to participate in the care of this patient. If you have any questions please not hesitate to contact me.             Please note that this dictation was created using voice recognition software. I have made every reasonable attempt to correct obvious errors but there may be errors of grammar and content that I may have overlooked prior to finalization of this note.      Luis Manuel Coronado MD  Interventional Spine and Sports  Physiatry  Physical Medicine and Rehabilitation  [facility]

## 2023-05-01 ENCOUNTER — APPOINTMENT (OUTPATIENT)
Dept: PHYSICAL THERAPY | Facility: REHABILITATION | Age: 53
End: 2023-05-01
Attending: PHYSICAL MEDICINE & REHABILITATION
Payer: MEDICAID

## 2023-05-02 ENCOUNTER — HOSPITAL ENCOUNTER (OUTPATIENT)
Facility: REHABILITATION | Age: 53
End: 2023-05-02
Attending: PHYSICAL MEDICINE & REHABILITATION | Admitting: PHYSICAL MEDICINE & REHABILITATION
Payer: MEDICAID

## 2023-05-02 ENCOUNTER — APPOINTMENT (OUTPATIENT)
Dept: RADIOLOGY | Facility: REHABILITATION | Age: 53
End: 2023-05-02
Attending: PHYSICAL MEDICINE & REHABILITATION
Payer: MEDICAID

## 2023-05-02 VITALS
WEIGHT: 153.88 LBS | TEMPERATURE: 96.8 F | HEIGHT: 59 IN | HEART RATE: 67 BPM | OXYGEN SATURATION: 98 % | BODY MASS INDEX: 31.02 KG/M2 | RESPIRATION RATE: 16 BRPM | SYSTOLIC BLOOD PRESSURE: 131 MMHG | DIASTOLIC BLOOD PRESSURE: 74 MMHG

## 2023-05-02 PROCEDURE — 700117 HCHG RX CONTRAST REV CODE 255

## 2023-05-02 PROCEDURE — 700111 HCHG RX REV CODE 636 W/ 250 OVERRIDE (IP)

## 2023-05-02 PROCEDURE — 64493 INJ PARAVERT F JNT L/S 1 LEV: CPT

## 2023-05-02 PROCEDURE — 700101 HCHG RX REV CODE 250

## 2023-05-02 PROCEDURE — 64494 INJ PARAVERT F JNT L/S 2 LEV: CPT

## 2023-05-02 RX ORDER — LIDOCAINE HYDROCHLORIDE 20 MG/ML
INJECTION, SOLUTION EPIDURAL; INFILTRATION; INTRACAUDAL; PERINEURAL
Status: COMPLETED
Start: 2023-05-02 | End: 2023-05-02

## 2023-05-02 RX ORDER — LIDOCAINE HYDROCHLORIDE 10 MG/ML
INJECTION, SOLUTION EPIDURAL; INFILTRATION; INTRACAUDAL; PERINEURAL
Status: COMPLETED
Start: 2023-05-02 | End: 2023-05-02

## 2023-05-02 RX ADMIN — IOHEXOL 10 ML: 240 INJECTION, SOLUTION INTRATHECAL; INTRAVASCULAR; INTRAVENOUS; ORAL at 09:03

## 2023-05-02 RX ADMIN — LIDOCAINE HYDROCHLORIDE 10 ML: 10 INJECTION, SOLUTION EPIDURAL; INFILTRATION; INTRACAUDAL; PERINEURAL at 09:03

## 2023-05-02 RX ADMIN — LIDOCAINE HYDROCHLORIDE 10 ML: 20 INJECTION, SOLUTION EPIDURAL; INFILTRATION; INTRACAUDAL at 09:03

## 2023-05-02 ASSESSMENT — PAIN DESCRIPTION - PAIN TYPE: TYPE: CHRONIC PAIN

## 2023-05-02 ASSESSMENT — FIBROSIS 4 INDEX: FIB4 SCORE: 0.73

## 2023-05-02 NOTE — OR SURGEON
Immediate Post OP Note    Pre-Op Diagnosis Codes:     * Lumbar spondylosis [M47.816]      Post-Op Diagnosis Codes:     * Lumbar spondylosis [M47.816]      Procedure(s):  Diagnostic medial branch blocks targeting the BILATERAL L4-5 and L5-S1 facet joints with fluoroscopic guidance #1 - Wound Class: Clean    Surgeon(s):  Luis Manuel Coronado M.D.    Anesthesiologist/Type of Anesthesia:  No anesthesia staff entered./Local    Surgical Staff:  Circulator: She Mcdaniel R.N.  Scrub Person: Nydia Ortiz  Radiology Technologist: Niurka Mcdowell    Specimens removed if any:  * No specimens in log *    Estimated Blood Loss: None    Findings: None    Complications: None        5/2/2023 9:12 AM Luis Manuel Coronado M.D.

## 2023-05-02 NOTE — INTERVAL H&P NOTE
H&P reviewed. The patient was examined and there are no changes to the H&P     Lungs clear to auscultation bilaterally.  No abdominal tenderness.  Heart regular rate and rhythm.  Vitals reviewed.    Proceed with the originally scheduled procedure.  The risks, benefits and alternatives were discussed.  The patient understands these.    Pre-Op Diagnosis Codes:     * Lumbar spondylosis [M47.816]  Procedure(s):  Diagnostic medial branch blocks targeting the BILATERAL L4-5 and L5-S1 facet joints with fluoroscopic guidance #1    Luis Manuel Coronado MD  Physical Medicine and Rehabilitation  Interventional Spine and Sports Physiatry  [facility]

## 2023-05-02 NOTE — PROGRESS NOTES
Discharge instructions reviewed again with reinforcement of infection prevention tips; ie:hand washing, covering cough,site  observation, Social distancing and mask use.  . Taking fluids w/o difficulty. Dressing clean dry intact.   DC to  via ambulatory with Pain Diary.

## 2023-05-02 NOTE — OP REPORT
Date of Service: 5/2/2023     Patient: Meggan Espinoza 53 y.o. female     MRN: [id number]     Physician/s: Luis Manuel Coronado MD    Pre-operative Diagnosis: Lumbosacral spondylosis, facet arthropathy. The patient was NOT seen for lumbar radiculopathy today.     Post-operative Diagnosis: Lumbosacral spondylosis, facet arthropathy. The patient was NOT seen for lumbar radiculopathy today.     Procedure: Medial Branch Blocks targeting the bilateral  L4-5 and L5-S1  facet joints     Description of procedure:    The risks, benefits, and alternatives of the procedure were reviewed and discussed with the patient.  Written informed consent was freely obtained. A pre-procedural time-out was conducted by the physician verifying patient’s identity, procedure to be performed, procedure site and side, and allergy verification. Appropriate equipment was determined to be in place for the procedure.       In the fluoroscopy suite the patient was placed in a prone position and the skin was prepped and draped in the usual sterile fashion. The fluoroscope was placed over the lower back at the appropriate angles, and the targets for injection were marked. A 27g needle was placed into each of the markings at the levels below, and approx 1cc of 1% Lidocaine was injected subcutaneously into the epidermal and dermal layers. The needle was removed intact.    Again noted with the patient's transitional anatomy with sacralization of L5.     Using an oblique view, A 25g 3.5 inch needle was then placed at the intersection of the transverse process and superior articular process at the S1 facet where the L5 dorsal ramus runs on the right side. The needle tips were then verified by AP, oblique, and lateral views.     Using an oblique view, A 25g 3.5 inch needle was then placed at the intersection of the transverse process and superior articular process at the L5-S1 facet joint where the L4 medial branch runs  on the right side. The needle tips  were then verified by AP, oblique, and lateral views.     Using an oblique view, A 25g 3.5 inch needle was then placed at the intersection of the transverse process and superior articular process at the L4-5 facet joint where the L3 medial branch runs  on the right side. The needle tips were then verified by AP, oblique, and lateral views.     Using an oblique view, A 25g 3.5 inch needle was then placed at the intersection of the transverse process and superior articular process at the S1 facet where the L5 dorsal ramus runs  on the left side. The needle tips were then verified by AP, oblique, and lateral views.       Using an oblique view, A 25g 3.5 inch needle was then placed at the intersection of the transverse process and superior articular process at the L5-S1 facet joint where the L4 medial branch runs  on the left side. The needle tips were then verified by AP, oblique, and lateral views.     Using an oblique view, A 25g 3.5 inch needle was then placed at the intersection of the transverse process and superior articular process at the L4-5 facet joint where the L3 medial branch runs  on the left side. The needle tips were then verified by AP, oblique, and lateral views.      In the AP view, less than 1 cc of contrast dye was used to highlight the medial branch while the fluoroscope was running live at the levels above. Following negative aspiration, approximately 1mL of 2% lidocaine  was then injected at the above levels, and the needles were removed intact after restyleted. The patient's back was covered with a 4x4 gauze, the area was cleansed with sterile normal saline, and a dressing was applied.     There were no complications noted, the patient was hemodynamically stable, and tolerated the procedure well.     Preprocedure pain 7/10 NRS  Postprocedure pain 2/10 NRS    Follow-up as scheduled      Luis Manuel Coronado MD  Physical Medicine and Rehabilitation  Interventional Spine and Sports Physiatry  [facility]            CPT  Intraarticular joint or medial branch block (MBB) - lumbar or sacral (1st level):  70025-wt  Intraarticular joint or medial branch block (MBB) - lumbar or sacral (2nd level):  00174-yg

## 2023-05-02 NOTE — PROGRESS NOTES
Admitting assessment completed w 2 identifiers. Pt stated procedure. Medical Hx, meds +allergies reviewed +systems: respiratory,cardiac, anticoagulants+ antibiotic therapy, renal, surgeries, pain, falls, implants,diabetes, hepatitis. Confirmed Pt understanding of DC Instructions. Infection prevention including COVID precautions reviewed. Pt has  waiting.  Pt states FBS this AM was 159.

## 2023-05-03 ENCOUNTER — APPOINTMENT (OUTPATIENT)
Dept: PHYSICAL THERAPY | Facility: REHABILITATION | Age: 53
End: 2023-05-03
Attending: PHYSICAL MEDICINE & REHABILITATION
Payer: MEDICAID

## 2023-05-05 ENCOUNTER — APPOINTMENT (OUTPATIENT)
Dept: PHYSICAL THERAPY | Facility: REHABILITATION | Age: 53
End: 2023-05-05
Attending: PHYSICAL MEDICINE & REHABILITATION
Payer: MEDICAID

## 2023-05-08 ENCOUNTER — APPOINTMENT (OUTPATIENT)
Dept: PHYSICAL THERAPY | Facility: REHABILITATION | Age: 53
End: 2023-05-08
Attending: PHYSICAL MEDICINE & REHABILITATION
Payer: MEDICAID

## 2023-05-09 ENCOUNTER — APPOINTMENT (OUTPATIENT)
Dept: RADIOLOGY | Facility: REHABILITATION | Age: 53
End: 2023-05-09
Attending: PHYSICAL MEDICINE & REHABILITATION
Payer: MEDICAID

## 2023-05-09 ENCOUNTER — HOSPITAL ENCOUNTER (OUTPATIENT)
Facility: REHABILITATION | Age: 53
End: 2023-05-09
Attending: PHYSICAL MEDICINE & REHABILITATION | Admitting: PHYSICAL MEDICINE & REHABILITATION
Payer: MEDICAID

## 2023-05-09 ENCOUNTER — APPOINTMENT (OUTPATIENT)
Dept: PHYSICAL THERAPY | Facility: REHABILITATION | Age: 53
End: 2023-05-09
Attending: PHYSICAL MEDICINE & REHABILITATION
Payer: MEDICAID

## 2023-05-09 VITALS
SYSTOLIC BLOOD PRESSURE: 115 MMHG | WEIGHT: 150.13 LBS | RESPIRATION RATE: 16 BRPM | OXYGEN SATURATION: 100 % | HEART RATE: 69 BPM | DIASTOLIC BLOOD PRESSURE: 80 MMHG | TEMPERATURE: 96.8 F | BODY MASS INDEX: 30.27 KG/M2 | HEIGHT: 59 IN

## 2023-05-09 PROCEDURE — 700101 HCHG RX REV CODE 250

## 2023-05-09 PROCEDURE — 64494 INJ PARAVERT F JNT L/S 2 LEV: CPT

## 2023-05-09 PROCEDURE — 700111 HCHG RX REV CODE 636 W/ 250 OVERRIDE (IP)

## 2023-05-09 PROCEDURE — 700117 HCHG RX CONTRAST REV CODE 255

## 2023-05-09 PROCEDURE — 64493 INJ PARAVERT F JNT L/S 1 LEV: CPT

## 2023-05-09 RX ORDER — LIDOCAINE HYDROCHLORIDE 20 MG/ML
INJECTION, SOLUTION EPIDURAL; INFILTRATION; INTRACAUDAL; PERINEURAL
Status: COMPLETED
Start: 2023-05-09 | End: 2023-05-09

## 2023-05-09 RX ORDER — LIDOCAINE HYDROCHLORIDE 10 MG/ML
INJECTION, SOLUTION EPIDURAL; INFILTRATION; INTRACAUDAL; PERINEURAL
Status: COMPLETED
Start: 2023-05-09 | End: 2023-05-09

## 2023-05-09 RX ADMIN — IOHEXOL 10 ML: 240 INJECTION, SOLUTION INTRATHECAL; INTRAVASCULAR; INTRAVENOUS; ORAL at 08:55

## 2023-05-09 RX ADMIN — LIDOCAINE HYDROCHLORIDE 10 ML: 20 INJECTION, SOLUTION EPIDURAL; INFILTRATION; INTRACAUDAL at 08:55

## 2023-05-09 RX ADMIN — LIDOCAINE HYDROCHLORIDE 10 ML: 10 INJECTION, SOLUTION EPIDURAL; INFILTRATION; INTRACAUDAL; PERINEURAL at 08:54

## 2023-05-09 ASSESSMENT — PAIN DESCRIPTION - PAIN TYPE: TYPE: CHRONIC PAIN

## 2023-05-09 ASSESSMENT — FIBROSIS 4 INDEX: FIB4 SCORE: 0.73

## 2023-05-09 NOTE — OR SURGEON
Immediate Post OP Note    Pre-Op Diagnosis Codes:     * Lumbar spondylosis [M47.816]      Post-Op Diagnosis Codes:     * Lumbar spondylosis [M47.816]      Procedure(s):  Diagnostic medial branch blocks targeting the BILATERAL L4-5 and L5-S1 facet joints with fluoroscopic guidance #2 - Wound Class: Clean    Surgeon(s):  Luis Manuel Coronado M.D.    Anesthesiologist/Type of Anesthesia:  No anesthesia staff entered./Local    Surgical Staff:  Circulator: Jojo Emery R.N.  Scrub Person: Briseyda Spangler C.N.A.  Radiology Technologist: Nathaniel Lira    Specimens removed if any:  * No specimens in log *    Estimated Blood Loss: None    Findings: None    Complications: None        5/9/2023 9:04 AM Luis Manuel Coronado M.D.

## 2023-05-09 NOTE — INTERVAL H&P NOTE
H&P reviewed. The patient was examined and there are no changes to the H&P     Lungs clear to auscultation bilaterally.  No abdominal tenderness.  Heart regular rate and rhythm.  Vitals reviewed.    Proceed with the originally scheduled procedure.  Preprocedure pain 8/10 NRS postprocedure pain 4/10 NRS during the diagnostic phase.  The patient also had greater than 50% functional improvement with her ability for lifting, improved distance and walking, improved standing tolerance.  The risks, benefits and alternatives were discussed.  The patient understands these.    Pre-Op Diagnosis Codes:     * Lumbar spondylosis [M47.816]  Procedure(s):  Diagnostic medial branch blocks targeting the BILATERAL L4-5 and L5-S1 facet joints with fluoroscopic guidance #2    Luis Manuel Coronado MD  Physical Medicine and Rehabilitation  Interventional Spine and Sports Physiatry  [facility]

## 2023-05-09 NOTE — PROGRESS NOTES
0758 Pt arrived to pre-procedure area. Procedure, plan for recovery, & pain diary reviewed. Site confirmed & consent signed. Allergies & current medications verified. Appointed  to be called for DC. Printed discharge instructions discussed & signed. Pt denies taking NSAIDS or anticoagulants in the last 5 days. MD to bedside prior to procedure. Per pt, FSBG checked pre-procedure= 179 @ 0615.    0905 Pt to recovery area s/p MBB #2 & updates received from procedure RN. Pt reports relief in pain at this time. Ice pack given for home care. Pt tolerating PO fluids & snacks. Dr. Coronado to bedside for post-procedure evaluation.      0910 Pt's ride notified of expected DC time.     0928 Pt ambulates without difficulty & meets criteria for DC. RN assisted pt off unit to appointed .

## 2023-05-09 NOTE — OP REPORT
Date of Service: 5/9/2023      Patient: Meggan Espinoza 53 y.o. female     MRN: [id number]      Physician/s: Luis Manuel Coronado MD     Pre-operative Diagnosis: Lumbosacral spondylosis, facet arthropathy. The patient was NOT seen for lumbar radiculopathy today.      Post-operative Diagnosis: Lumbosacral spondylosis, facet arthropathy. The patient was NOT seen for lumbar radiculopathy today.      Procedure: Medial Branch Blocks targeting the bilateral  L4-5 and L5-S1  facet joints      Description of procedure:     The risks, benefits, and alternatives of the procedure were reviewed and discussed with the patient.  Written informed consent was freely obtained. A pre-procedural time-out was conducted by the physician verifying patient’s identity, procedure to be performed, procedure site and side, and allergy verification. Appropriate equipment was determined to be in place for the procedure.         In the fluoroscopy suite the patient was placed in a prone position and the skin was prepped and draped in the usual sterile fashion. The fluoroscope was placed over the lower back at the appropriate angles, and the targets for injection were marked. A 27g needle was placed into each of the markings at the levels below, and approx 1cc of 1% Lidocaine was injected subcutaneously into the epidermal and dermal layers. The needle was removed intact.     Again noted with the patient's transitional anatomy with sacralization of L5.     Using an oblique view, A 25g 3.5 inch needle was then placed at the intersection of the transverse process and superior articular process at the S1 facet where the L5 dorsal ramus runs on the right side. The needle tips were then verified by AP, oblique, and lateral views.      Using an oblique view, A 25g 3.5 inch needle was then placed at the intersection of the transverse process and superior articular process at the L5-S1 facet joint where the L4 medial branch runs  on the right side. The  needle tips were then verified by AP, oblique, and lateral views.      Using an oblique view, A 25g 3.5 inch needle was then placed at the intersection of the transverse process and superior articular process at the L4-5 facet joint where the L3 medial branch runs  on the right side. The needle tips were then verified by AP, oblique, and lateral views.      Using an oblique view, A 25g 3.5 inch needle was then placed at the intersection of the transverse process and superior articular process at the S1 facet where the L5 dorsal ramus runs  on the left side. The needle tips were then verified by AP, oblique, and lateral views.         Using an oblique view, A 25g 3.5 inch needle was then placed at the intersection of the transverse process and superior articular process at the L5-S1 facet joint where the L4 medial branch runs  on the left side. The needle tips were then verified by AP, oblique, and lateral views.      Using an oblique view, A 25g 3.5 inch needle was then placed at the intersection of the transverse process and superior articular process at the L4-5 facet joint where the L3 medial branch runs  on the left side. The needle tips were then verified by AP, oblique, and lateral views.      In the AP view, less than 1 cc of contrast dye was used to highlight the medial branch while the fluoroscope was running live at the levels above. Following negative aspiration, approximately 1mL of 2% lidocaine  was then injected at the above levels, and the needles were removed intact after restyleted. The patient's back was covered with a 4x4 gauze, the area was cleansed with sterile normal saline, and a dressing was applied.      There were no complications noted, the patient was hemodynamically stable, and tolerated the procedure well.      Preprocedure pain 6/10 NRS  Postprocedure pain 0/10 NRS     Follow-up as scheduled        Luis Manuel Coronado MD  Physical Medicine and Rehabilitation  Interventional Spine and Sports  Physiatry  [facility]              CPT  Intraarticular joint or medial branch block (MBB) - lumbar or sacral (1st level):  29547-89-ds  Intraarticular joint or medial branch block (MBB) - lumbar or sacral (2nd level):  42182-85-nd

## 2023-05-11 ENCOUNTER — TELEPHONE (OUTPATIENT)
Dept: PHYSICAL THERAPY | Facility: REHABILITATION | Age: 53
End: 2023-05-11
Payer: MEDICAID

## 2023-05-11 NOTE — OP THERAPY DISCHARGE SUMMARY
Outpatient Physical Therapy  DISCHARGE SUMMARY NOTE      [facility] [address], [address]  [place] [address]  Phone:  [phone]    Date of Visit: 05/11/2023    Patient: Meggan Espinoza  YOB: 1970  MRN: [id number]     Referring Provider: Luis Manuel Coronado M.D.   Referring Diagnosis Chronic left shoulder pain         Functional Assessment Used        Your patient is being discharged from Physical Therapy with the following comments:   Patient prefers to be discharged from PT    Comments:  Please see daily treatment notes     Limitations Remaining:      Recommendations:  Discharge from PT    Taylor Jackson PT, MSPT    Date: 5/11/2023

## 2023-05-16 ENCOUNTER — APPOINTMENT (OUTPATIENT)
Dept: PHYSICAL THERAPY | Facility: REHABILITATION | Age: 53
End: 2023-05-16
Attending: PHYSICAL MEDICINE & REHABILITATION
Payer: MEDICAID

## 2023-05-17 ENCOUNTER — OFFICE VISIT (OUTPATIENT)
Dept: PHYSICAL MEDICINE AND REHAB | Facility: MEDICAL CENTER | Age: 53
End: 2023-05-17
Payer: MEDICAID

## 2023-05-17 VITALS
TEMPERATURE: 98 F | DIASTOLIC BLOOD PRESSURE: 88 MMHG | OXYGEN SATURATION: 94 % | BODY MASS INDEX: 31.02 KG/M2 | HEART RATE: 80 BPM | SYSTOLIC BLOOD PRESSURE: 126 MMHG | HEIGHT: 59 IN | WEIGHT: 153.88 LBS

## 2023-05-17 DIAGNOSIS — M47.816 LUMBAR SPONDYLOSIS: ICD-10-CM

## 2023-05-17 DIAGNOSIS — M53.3 SACROILIAC JOINT DYSFUNCTION OF BOTH SIDES: ICD-10-CM

## 2023-05-17 DIAGNOSIS — M51.36 LUMBAR DEGENERATIVE DISC DISEASE: ICD-10-CM

## 2023-05-17 PROCEDURE — 3079F DIAST BP 80-89 MM HG: CPT | Performed by: PHYSICAL MEDICINE & REHABILITATION

## 2023-05-17 PROCEDURE — 99214 OFFICE O/P EST MOD 30 MIN: CPT | Performed by: PHYSICAL MEDICINE & REHABILITATION

## 2023-05-17 PROCEDURE — 1125F AMNT PAIN NOTED PAIN PRSNT: CPT | Performed by: PHYSICAL MEDICINE & REHABILITATION

## 2023-05-17 PROCEDURE — 3074F SYST BP LT 130 MM HG: CPT | Performed by: PHYSICAL MEDICINE & REHABILITATION

## 2023-05-17 ASSESSMENT — PAIN SCALES - GENERAL: PAINLEVEL: 3=SLIGHT PAIN

## 2023-05-17 ASSESSMENT — FIBROSIS 4 INDEX: FIB4 SCORE: 0.73

## 2023-05-17 ASSESSMENT — PATIENT HEALTH QUESTIONNAIRE - PHQ9: CLINICAL INTERPRETATION OF PHQ2 SCORE: 0

## 2023-05-17 NOTE — PROGRESS NOTES
Follow up patient note  Interventional spine and Pain  Physiatry (physical medicine and  Rehabilitation)       Chief complaint:   Chief Complaint   Patient presents with    Follow-Up     1wk Post SP          HISTORY    Please see new patient note by Dr Coronado,  for more details.     HPI  Patient identification: Meggan Espinoza ,  1970,   With Diagnoses of Lumbar spondylosis, Lumbar degenerative disc disease, and Sacroiliac joint dysfunction of both sides were pertinent to this visit.     Procedures:  10/11/2022 bilateral sacroiliac joint injection 30% improved  Post procedure  2023 diagnostic medial branch blocks targeting the bilateral L4-5 and L5-S1 facet joints Preprocedure pain 8/10 NRS postprocedure pain 4/10 NRS during the diagnostic phase.  The patient also had greater than 50% functional improvement with her ability for lifting, improved distance and walking, improved standing tolerance.    2023 diagnostic medial branch blocks targeting the bilateral L4-5 and L5-S1 facet joints preprocedure pain 6/ 10 NRS postprocedure pain 0/10 NRS during the diagnostic phase.      The patient had excellent pain relief during the diagnostic phase and functional improvement during the diagnostic phase of the medial branch blocks as above.  After the diagnostic phase the patient's pain returned.  Pain is 6 out of 10 intensity aching in quality bilateral low back pain axial worse with standing and lifting.      Medications tried include NSAIDs, acetaminophen, gabapentin for pain and for muscle relaxer, Meloxicam, oxycodone, tramadol     ROS Red Flags :   Fever, Chills, Sweats: Denies  Involuntary Weight Loss: Denies  Bowel/Bladder Incontinence: Denies  Saddle Anesthesia: Denies        PMHx:   Past Medical History:   Diagnosis Date    Arthritis     back    Bowel habit changes     IBS for years    Diabetes     Dyslipidemia     GERD (gastroesophageal reflux disease)     Heart burn     High cholesterol      HTN (hypertension)     Hypertension     Lower back pain     chronic    Vitamin deficiency        PSHx:   Past Surgical History:   Procedure Laterality Date    INJ,ANES LUMBAR/CERVICAL Bilateral 5/9/2023    Procedure: Diagnostic medial branch blocks targeting the BILATERAL L4-5 and L5-S1 facet joints with fluoroscopic guidance #2;  Surgeon: Luis Manuel Coronado M.D.;  [facility];  Service: Pain Management    INJ,ANES LUMBAR/CERVICAL Bilateral 5/2/2023    Procedure: Diagnostic medial branch blocks targeting the BILATERAL L4-5 and L5-S1 facet joints with fluoroscopic guidance #1;  Surgeon: Luis Manuel Coronado M.D.;  Location: [facility];  Service: Pain Management    OK INJECTION,SACROILIAC JOINT Bilateral 10/11/2022    Procedure: BILATERAL sacroiliac joint injection with fluoroscopic guidance;  Surgeon: Luis Manuel Coronado M.D.;  Location: [facility];  Service: Pain Management    OK NEUROPLASTY & OR TRANSPOS MEDIAN NRV CARPAL MICHELLE Left 2/15/2022    Procedure: LEFT OPEN CARPAL TUNNEL RELEASE;  Surgeon: Mariusz Corrigan M.D.;  Location: SURGERY SAME DAY [facility];  Service: Orthopedics    OK SHLDR ARTHROSCOP,PART ACROMIOPLAS Right 9/22/2020    Procedure: DECOMPRESSION, SHOULDER, ARTHROSCOPIC - SUBACROMIAL, LABRAL DEBRIDEMENT;  Surgeon: Toribio Coronado M.D.;  Location: SURGERY [facility];  Service: Orthopedics    PB ARTHROSCOPY SHOULDER SURGICAL BICEPS TENODES* Right 9/22/2020    Procedure: ARTHROSCOPY, SHOULDER, WITH BICEPS TENODESIS;  Surgeon: Toribio Coronado M.D.;  Location: SURGERY [facility];  Service: Orthopedics    OTHER ORTHOPEDIC SURGERY  2017    left carpal tunnel release    CHOLECYSTECTOMY      TONSILLECTOMY      TUBAL COAGULATION LAPAROSCOPIC BILATERAL         Family history   Family History   Problem Relation Age of Onset    Hypertension Mother     Diabetes Mother          Medications:   Outpatient Medications Marked as Taking for the  5/17/23 encounter (Office Visit) with Luis Manuel Coronado M.D.   Medication Sig Dispense Refill    Topiramate ER (TROKENDI XR) 200 MG CAPSULE SR 24 HR Take 0.9091 Capsule 24 hour sustained release by mouth every day. 90 Capsule 3    atorvastatin (LIPITOR) 40 MG Tab Take 40 mg by mouth every day.      famotidine (PEPCID) 40 MG Tab Take 40 mg by mouth every day.      Empagliflozin (JARDIANCE) 10 MG Tab tablet Take 10 mg by mouth every day.      Galcanezumab-gnlm (EMGALITY) 120 MG/ML Solution Auto-injector INJECT 1 PEN SUBCUTANEOUSLY AS INSTRUCTED EVERY 4 WEEKS (Patient taking differently: Inject 120 mg under the skin every 4 weeks. INJECT 1 PEN SUBCUTANEOUSLY AS INSTRUCTED EVERY 4 WEEKS) 3 mL 0    Ubrogepant (UBRELVY) 100 MG Tab Take 1 Tablet by mouth as needed (1 tab at headache osnet, repeat in 2 hour prn). 8 Tablet 0    pantoprazole (PROTONIX) 40 MG Tablet Delayed Response Take 1 Tablet by mouth every day.      TRUE METRIX BLOOD GLUCOSE TEST strip USE AS DIRECTED 3-4 TIMES A DAY      LEVEMIR FLEXTOUCH 100 UNIT/ML injection PEN Inject 26 Units under the skin every evening.      colestipol (COLESTID) 1 GM Tab Take 1 g by mouth every day.      [employer] Lancets Ultra-Thin 30G Misc AS DIRECTED FOUR TIMES DAILY CHECK SUGARS 30 DAYS      metformin (GLUCOPHAGE) 1000 MG tablet Take 1,000 mg by mouth 2 times a day.      traZODone (DESYREL) 50 MG Tab Take  mg by mouth every evening. 1-2 tablets      gabapentin (NEURONTIN) 800 MG tablet Take 1 Tab by mouth 3 times a day. 90 Tab 0    Blood Glucose Monitoring Suppl (TRUE METRIX AIR GLUCOSE METER) Device 1 Each by Does not apply route 2 Times a Day. 1 Device 0    Misc. Devices Misc DM lancets Dispense brand covered by patients insurance Testing frequency: Twice dailyDx DM w/o complication. E11.9 100 Each 3    Misc. Devices Misc 1. Glucometer #1   2. Test strips and lancets to test blood sugars qid     #120 120 Each 6    lisinopril (PRINIVIL) 20 MG Tab Take 1 Tab by mouth every day.  30 Tab 11    Misc. Devices MISC Needles for solostar lantus use as directed 120 Each 6        Current Outpatient Medications on File Prior to Visit   Medication Sig Dispense Refill    Topiramate ER (TROKENDI XR) 200 MG CAPSULE SR 24 HR Take 0.9091 Capsule 24 hour sustained release by mouth every day. 90 Capsule 3    atorvastatin (LIPITOR) 40 MG Tab Take 40 mg by mouth every day.      famotidine (PEPCID) 40 MG Tab Take 40 mg by mouth every day.      Empagliflozin (JARDIANCE) 10 MG Tab tablet Take 10 mg by mouth every day.      Galcanezumab-gnlm (EMGALITY) 120 MG/ML Solution Auto-injector INJECT 1 PEN SUBCUTANEOUSLY AS INSTRUCTED EVERY 4 WEEKS (Patient taking differently: Inject 120 mg under the skin every 4 weeks. INJECT 1 PEN SUBCUTANEOUSLY AS INSTRUCTED EVERY 4 WEEKS) 3 mL 0    Ubrogepant (UBRELVY) 100 MG Tab Take 1 Tablet by mouth as needed (1 tab at headache osnet, repeat in 2 hour prn). 8 Tablet 0    pantoprazole (PROTONIX) 40 MG Tablet Delayed Response Take 1 Tablet by mouth every day.      TRUE METRIX BLOOD GLUCOSE TEST strip USE AS DIRECTED 3-4 TIMES A DAY      LEVEMIR FLEXTOUCH 100 UNIT/ML injection PEN Inject 26 Units under the skin every evening.      colestipol (COLESTID) 1 GM Tab Take 1 g by mouth every day.      [employer] Lancets Ultra-Thin 30G Misc AS DIRECTED FOUR TIMES DAILY CHECK SUGARS 30 DAYS      metformin (GLUCOPHAGE) 1000 MG tablet Take 1,000 mg by mouth 2 times a day.      traZODone (DESYREL) 50 MG Tab Take  mg by mouth every evening. 1-2 tablets      gabapentin (NEURONTIN) 800 MG tablet Take 1 Tab by mouth 3 times a day. 90 Tab 0    Blood Glucose Monitoring Suppl (TRUE METRIX AIR GLUCOSE METER) Device 1 Each by Does not apply route 2 Times a Day. 1 Device 0    Misc. Devices Misc DM lancets Dispense brand covered by patients insurance Testing frequency: Twice dailyDx DM w/o complication. E11.9 100 Each 3    Misc. Devices Misc 1. Glucometer #1   2. Test strips and lancets to test blood sugars  "qid     #120 120 Each 6    lisinopril (PRINIVIL) 20 MG Tab Take 1 Tab by mouth every day. 30 Tab 11    Misc. Devices MISC Needles for solostar lantus use as directed 120 Each 6     No current facility-administered medications on file prior to visit.         Allergies:   Allergies   Allergen Reactions    Nkda [No Known Drug Allergy]        Social Hx:   Social History     Socioeconomic History    Marital status:      Spouse name: Not on file    Number of children: Not on file    Years of education: Not on file    Highest education level: Not on file   Occupational History    Not on file   Tobacco Use    Smoking status: Never    Smokeless tobacco: Never   Vaping Use    Vaping Use: Never used   Substance and Sexual Activity    Alcohol use: No    Drug use: No    Sexual activity: Not on file   Other Topics Concern    Not on file   Social History Narrative    Not on file     Social Determinants of Health     Financial Resource Strain: Not on file   Food Insecurity: Not on file   Transportation Needs: Not on file   Physical Activity: Not on file   Stress: Not on file   Social Connections: Not on file   Intimate Partner Violence: Not on file   Housing Stability: Not on file         EXAMINATION     Physical Exam:   Vitals: /88 (BP Location: Right arm, Patient Position: Sitting, BP Cuff Size: Adult)   Pulse 80   Temp 36.7 °C (98 °F) (Temporal)   Ht 1.499 m (4' 11\")   Wt 69.8 kg (153 lb 14.1 oz)   SpO2 94%     Constitutional:   Body Habitus: Body mass index is 31.08 kg/m².  Cooperation: Fully cooperates with exam  Appearance: Well-groomed no disheveled    Respiratory-  breathing comfortable on room air, no audible wheezing  Cardiovascular- capillary refills less than 2 seconds. No lower extremity edema is noted.   Psychiatric- alert and oriented ×3. Normal affect.    MSK and Neuro: -    Strength is 5 out of 5 in the bilateral lower extremities.  No signs of action over the procedure sites.  Extension rotation " maneuver is positive bilaterally for reproducing back pain.      MEDICAL DECISION MAKING    DATA    Labs:   Lab Results   Component Value Date/Time    SODIUM 136 03/17/2023 12:44 PM    POTASSIUM 5.6 (H) 03/17/2023 12:44 PM    CHLORIDE 107 03/17/2023 12:44 PM    CO2 15 (L) 03/17/2023 12:44 PM    GLUCOSE 229 (H) 03/17/2023 12:44 PM    BUN 62 (H) 03/17/2023 12:44 PM    CREATININE 1.50 (H) 03/17/2023 12:44 PM    CREATININE 0.6 05/11/2009 08:44 AM    BUNCREATRAT 22.0 03/23/2022 03:54 PM        Lab Results   Component Value Date/Time    PROTHROMBTM 13.3 07/20/2012 01:52 PM    INR 1.00 07/20/2012 01:52 PM        Lab Results   Component Value Date/Time    WBC 6.3 03/17/2023 12:44 PM    RBC 4.38 03/17/2023 12:44 PM    HEMOGLOBIN 11.2 (L) 03/17/2023 12:44 PM    HEMATOCRIT 37.1 03/17/2023 12:44 PM    MCV 84.7 03/17/2023 12:44 PM    MCH 25.6 (L) 03/17/2023 12:44 PM    MCHC 30.2 (L) 03/17/2023 12:44 PM    MPV 9.9 03/17/2023 12:44 PM    NEUTSPOLYS 74.60 (H) 03/17/2023 12:44 PM    LYMPHOCYTES 18.10 (L) 03/17/2023 12:44 PM    MONOCYTES 4.60 03/17/2023 12:44 PM    EOSINOPHILS 1.60 03/17/2023 12:44 PM    BASOPHILS 0.60 03/17/2023 12:44 PM    HYPOCHROMIA 1+ 02/18/2014 09:01 AM    ANISOCYTOSIS 1+ 07/20/2012 01:09 PM        Lab Results   Component Value Date/Time    HBA1C 7.6 (H) 02/11/2022 01:08 PM          Imaging:   I personally reviewed following images    MRI lumbar spine 8/31/2021  Multilevel degenerative changes with degenerative disc disease.  Type I Modic changes at L5-S1.  Facet arthropathy worst bilaterally at L4-5 and L5-S1.  No areas of high-grade central canal or neuroforaminal stenosis.        I reviewed the following radiology reports          Results for orders placed during the hospital encounter of 01/19/22    MR-BRAIN-W/O    Impression  No acute intracranial process.    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 This most  likely represents gliosis from previous insult. The cleft does not appear to be lined by cortex, however if there is concern for schizencephaly (which can cause seizures) consider 3-D FSPGR/SPACE T1 imaging to assess whether there is a gray matter lining  to the margins of this cleft.             Results for orders placed in visit on 08/31/21    MR-CERVICAL SPINE-W/O    Impression  Mild multilevel degenerative disc disease. No significant central canal or neural foraminal narrowing.      Results for orders placed in visit on 08/31/21    MR-LUMBAR SPINE-W/O    Impression  Mild multilevel degenerative disc disease and facet degeneration. No significant central canal or neural foraminal narrowing.      Results for orders placed in visit on 08/31/21    MR-THORACIC SPINE-W/O    Impression  1.  Mild degenerative disc disease. No significant central canal or neural foraminal narrowing.    2.  Minimal chronic superior endplate compression fracture of T10. No significant posterior retropulsion.    Results for orders placed in visit on 08/31/21    MR-LUMBAR SPINE-W/O    Impression  Mild multilevel degenerative disc disease and facet degeneration. No significant central canal or neural foraminal narrowing.                                                                       Results for orders placed during the hospital encounter of 07/20/12    CT-ABDOMEN-PELVIS W/ IV Contrast (R/O acute, uncomplicated appendicitis or diverticulitis, bowel ischemia) FOR HIGH BMI PATIENTS    Addendum 7/20/2012  3:22 PM  Gallbladder is not identified with certainty and may be markedly contracted.    Impression  1. Nondistended segments of the appendix as evidence against acute appendicitis.  2. No evidence of bowel obstruction or free fluid.  3. Low-attenuation liver consistent with fatty change and splenomegaly.  No ascites.  4. Bilateral  renal scarring.  No hydronephrosis.  5. 2.6-cm right ovarian cyst.  6. Right adnexal clip.  Possible left clip migration into the anterior abdominal cavity.                     Results for orders placed during the hospital encounter of 21    DX-CERVICAL SPINE-4+ VIEWS    Impression  Degenerative changes of the cervical spine without acute osseous abnormality.              Results for orders placed in visit on 11/10/21    DX-HAND 3+ RIGHT            Results for orders placed during the hospital encounter of 10/27/13    DX-LUMBAR SPINE-2 OR 3 VIEWS    Impression  Negative lumbar spine series.        INTERPRETING LOCATION: [address], [place], [address]   Results for orders placed during the hospital encounter of 21    DX-LUMBAR SPINE-4+ VIEWS    Impression  1.  Grossly stable multilevel degenerative changes of the lumbar spine.  2.  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 This can be a pain generator.              Results for orders placed during the hospital encounter of 10/27/13    DX-THORACIC SPINE-2 VIEWS    Impression  1. No acute appearing compression fracture.    2. Minimal potential or old compression of the anterior aspect of the superior endplate of T10.    3. Thoracolumbar junction scoliosis convex left.            INTERPRETING LOCATION: [address], [place], [address]            DIAGNOSIS   Visit Diagnoses     ICD-10-CM   1. Lumbar spondylosis  M47.816   2. Lumbar degenerative disc disease  M51.36   3. Sacroiliac joint dysfunction of both sides  M53.3         ASSESSMENT and PLAN:     Meggan Caba Olga  1970 female      Meggan was seen today for follow-up.    Diagnoses and all orders for this visit:    Lumbar spondylosis  -     Referral to Physical Medicine Rehab; Future    Lumbar degenerative disc disease    Sacroiliac joint dysfunction of both sides        Status post diagnostic medial branch blocks x2 targeting the BILATERAL L4-5 and L5-S1  facet joints with greater than 80% pain relief during the diagnostic phase.  This represents a positive diagnostic block.    I believe the patient is a good candidate for BILATERAL radiofrequency neurotomy targeting the medial branches innervating the L4-5 and L5-S1 facet joints    The risks benefits and alternatives to this procedure were discussed and the patient wishes to proceed with the procedure. Risks include but are not limited to damage to surrounding structures, infection, bleeding, worsening of pain which can be permanent, weakness which can be permanent. Benefits include pain relief, improved function. Alternatives includes not doing the procedure.         Medications: Continue gabapentin as needed.    Follow up: After the above procedure    Thank you for allowing me to participate in the care of this patient. If you have any questions please not hesitate to contact me.             Please note that this dictation was created using voice recognition software. I have made every reasonable attempt to correct obvious errors but there may be errors of grammar and content that I may have overlooked prior to finalization of this note.      Luis Manuel Coronado MD  Interventional Spine and Sports Physiatry  Physical Medicine and Rehabilitation  [facility]

## 2023-05-17 NOTE — H&P (VIEW-ONLY)
Follow up patient note  Interventional spine and Pain  Physiatry (physical medicine and  Rehabilitation)       Chief complaint:   Chief Complaint   Patient presents with    Follow-Up     1wk Post SP          HISTORY    Please see new patient note by Dr Coronado,  for more details.     HPI  Patient identification: Meggan Espinoza ,  1970,   With Diagnoses of Lumbar spondylosis, Lumbar degenerative disc disease, and Sacroiliac joint dysfunction of both sides were pertinent to this visit.     Procedures:  10/11/2022 bilateral sacroiliac joint injection 30% improved  Post procedure  2023 diagnostic medial branch blocks targeting the bilateral L4-5 and L5-S1 facet joints Preprocedure pain 8/10 NRS postprocedure pain 4/10 NRS during the diagnostic phase.  The patient also had greater than 50% functional improvement with her ability for lifting, improved distance and walking, improved standing tolerance.    2023 diagnostic medial branch blocks targeting the bilateral L4-5 and L5-S1 facet joints preprocedure pain 6/ 10 NRS postprocedure pain 0/10 NRS during the diagnostic phase.      The patient had excellent pain relief during the diagnostic phase and functional improvement during the diagnostic phase of the medial branch blocks as above.  After the diagnostic phase the patient's pain returned.  Pain is 6 out of 10 intensity aching in quality bilateral low back pain axial worse with standing and lifting.      Medications tried include NSAIDs, acetaminophen, gabapentin for pain and for muscle relaxer, Meloxicam, oxycodone, tramadol     ROS Red Flags :   Fever, Chills, Sweats: Denies  Involuntary Weight Loss: Denies  Bowel/Bladder Incontinence: Denies  Saddle Anesthesia: Denies        PMHx:   Past Medical History:   Diagnosis Date    Arthritis     back    Bowel habit changes     IBS for years    Diabetes     Dyslipidemia     GERD (gastroesophageal reflux disease)     Heart burn     High cholesterol      HTN (hypertension)     Hypertension     Lower back pain     chronic    Vitamin deficiency        PSHx:   Past Surgical History:   Procedure Laterality Date    INJ,ANES LUMBAR/CERVICAL Bilateral 5/9/2023    Procedure: Diagnostic medial branch blocks targeting the BILATERAL L4-5 and L5-S1 facet joints with fluoroscopic guidance #2;  Surgeon: Luis Manuel Coronado M.D.;  [facility];  Service: Pain Management    INJ,ANES LUMBAR/CERVICAL Bilateral 5/2/2023    Procedure: Diagnostic medial branch blocks targeting the BILATERAL L4-5 and L5-S1 facet joints with fluoroscopic guidance #1;  Surgeon: Luis Manuel Coronado M.D.;  Location: [facility];  Service: Pain Management    WV INJECTION,SACROILIAC JOINT Bilateral 10/11/2022    Procedure: BILATERAL sacroiliac joint injection with fluoroscopic guidance;  Surgeon: Luis Manuel Coronado M.D.;  Location: [facility];  Service: Pain Management    WV NEUROPLASTY & OR TRANSPOS MEDIAN NRV CARPAL MICHELLE Left 2/15/2022    Procedure: LEFT OPEN CARPAL TUNNEL RELEASE;  Surgeon: Mariusz Corrigan M.D.;  Location: SURGERY [facility];  Service: Orthopedics    WV SHLDR ARTHROSCOP,PART ACROMIOPLAS Right 9/22/2020    Procedure: DECOMPRESSION, SHOULDER, ARTHROSCOPIC - SUBACROMIAL, LABRAL DEBRIDEMENT;  Surgeon: Toribio Coronado M.D.;  Location: SURGERY [facility];  Service: Orthopedics    PB ARTHROSCOPY SHOULDER SURGICAL BICEPS TENODES* Right 9/22/2020    Procedure: ARTHROSCOPY, SHOULDER, WITH BICEPS TENODESIS;  Surgeon: Toribio Coronado M.D.;  Location: SURGERY [facility];  Service: Orthopedics    OTHER ORTHOPEDIC SURGERY  2017    left carpal tunnel release    CHOLECYSTECTOMY      TONSILLECTOMY      TUBAL COAGULATION LAPAROSCOPIC BILATERAL         Family history   Family History   Problem Relation Age of Onset    Hypertension Mother     Diabetes Mother          Medications:   Outpatient Medications Marked as Taking for the  5/17/23 encounter (Office Visit) with Luis Manuel Coronado M.D.   Medication Sig Dispense Refill    Topiramate ER (TROKENDI XR) 200 MG CAPSULE SR 24 HR Take 0.9091 Capsule 24 hour sustained release by mouth every day. 90 Capsule 3    atorvastatin (LIPITOR) 40 MG Tab Take 40 mg by mouth every day.      famotidine (PEPCID) 40 MG Tab Take 40 mg by mouth every day.      Empagliflozin (JARDIANCE) 10 MG Tab tablet Take 10 mg by mouth every day.      Galcanezumab-gnlm (EMGALITY) 120 MG/ML Solution Auto-injector INJECT 1 PEN SUBCUTANEOUSLY AS INSTRUCTED EVERY 4 WEEKS (Patient taking differently: Inject 120 mg under the skin every 4 weeks. INJECT 1 PEN SUBCUTANEOUSLY AS INSTRUCTED EVERY 4 WEEKS) 3 mL 0    Ubrogepant (UBRELVY) 100 MG Tab Take 1 Tablet by mouth as needed (1 tab at headache osnet, repeat in 2 hour prn). 8 Tablet 0    pantoprazole (PROTONIX) 40 MG Tablet Delayed Response Take 1 Tablet by mouth every day.      TRUE METRIX BLOOD GLUCOSE TEST strip USE AS DIRECTED 3-4 TIMES A DAY      LEVEMIR FLEXTOUCH 100 UNIT/ML injection PEN Inject 26 Units under the skin every evening.      colestipol (COLESTID) 1 GM Tab Take 1 g by mouth every day.      [employer] Lancets Ultra-Thin 30G Misc AS DIRECTED FOUR TIMES DAILY CHECK SUGARS 30 DAYS      metformin (GLUCOPHAGE) 1000 MG tablet Take 1,000 mg by mouth 2 times a day.      traZODone (DESYREL) 50 MG Tab Take  mg by mouth every evening. 1-2 tablets      gabapentin (NEURONTIN) 800 MG tablet Take 1 Tab by mouth 3 times a day. 90 Tab 0    Blood Glucose Monitoring Suppl (TRUE METRIX AIR GLUCOSE METER) Device 1 Each by Does not apply route 2 Times a Day. 1 Device 0    Misc. Devices Misc DM lancets Dispense brand covered by patients insurance Testing frequency: Twice dailyDx DM w/o complication. E11.9 100 Each 3    Misc. Devices Misc 1. Glucometer #1   2. Test strips and lancets to test blood sugars qid     #120 120 Each 6    lisinopril (PRINIVIL) 20 MG Tab Take 1 Tab by mouth every day.  30 Tab 11    Misc. Devices MISC Needles for solostar lantus use as directed 120 Each 6        Current Outpatient Medications on File Prior to Visit   Medication Sig Dispense Refill    Topiramate ER (TROKENDI XR) 200 MG CAPSULE SR 24 HR Take 0.9091 Capsule 24 hour sustained release by mouth every day. 90 Capsule 3    atorvastatin (LIPITOR) 40 MG Tab Take 40 mg by mouth every day.      famotidine (PEPCID) 40 MG Tab Take 40 mg by mouth every day.      Empagliflozin (JARDIANCE) 10 MG Tab tablet Take 10 mg by mouth every day.      Galcanezumab-gnlm (EMGALITY) 120 MG/ML Solution Auto-injector INJECT 1 PEN SUBCUTANEOUSLY AS INSTRUCTED EVERY 4 WEEKS (Patient taking differently: Inject 120 mg under the skin every 4 weeks. INJECT 1 PEN SUBCUTANEOUSLY AS INSTRUCTED EVERY 4 WEEKS) 3 mL 0    Ubrogepant (UBRELVY) 100 MG Tab Take 1 Tablet by mouth as needed (1 tab at headache osnet, repeat in 2 hour prn). 8 Tablet 0    pantoprazole (PROTONIX) 40 MG Tablet Delayed Response Take 1 Tablet by mouth every day.      TRUE METRIX BLOOD GLUCOSE TEST strip USE AS DIRECTED 3-4 TIMES A DAY      LEVEMIR FLEXTOUCH 100 UNIT/ML injection PEN Inject 26 Units under the skin every evening.      colestipol (COLESTID) 1 GM Tab Take 1 g by mouth every day.      [employer] Lancets Ultra-Thin 30G Misc AS DIRECTED FOUR TIMES DAILY CHECK SUGARS 30 DAYS      metformin (GLUCOPHAGE) 1000 MG tablet Take 1,000 mg by mouth 2 times a day.      traZODone (DESYREL) 50 MG Tab Take  mg by mouth every evening. 1-2 tablets      gabapentin (NEURONTIN) 800 MG tablet Take 1 Tab by mouth 3 times a day. 90 Tab 0    Blood Glucose Monitoring Suppl (TRUE METRIX AIR GLUCOSE METER) Device 1 Each by Does not apply route 2 Times a Day. 1 Device 0    Misc. Devices Misc DM lancets Dispense brand covered by patients insurance Testing frequency: Twice dailyDx DM w/o complication. E11.9 100 Each 3    Misc. Devices Misc 1. Glucometer #1   2. Test strips and lancets to test blood sugars  "qid     #120 120 Each 6    lisinopril (PRINIVIL) 20 MG Tab Take 1 Tab by mouth every day. 30 Tab 11    Misc. Devices MISC Needles for solostar lantus use as directed 120 Each 6     No current facility-administered medications on file prior to visit.         Allergies:   Allergies   Allergen Reactions    Nkda [No Known Drug Allergy]        Social Hx:   Social History     Socioeconomic History    Marital status:      Spouse name: Not on file    Number of children: Not on file    Years of education: Not on file    Highest education level: Not on file   Occupational History    Not on file   Tobacco Use    Smoking status: Never    Smokeless tobacco: Never   Vaping Use    Vaping Use: Never used   Substance and Sexual Activity    Alcohol use: No    Drug use: No    Sexual activity: Not on file   Other Topics Concern    Not on file   Social History Narrative    Not on file     Social Determinants of Health     Financial Resource Strain: Not on file   Food Insecurity: Not on file   Transportation Needs: Not on file   Physical Activity: Not on file   Stress: Not on file   Social Connections: Not on file   Intimate Partner Violence: Not on file   Housing Stability: Not on file         EXAMINATION     Physical Exam:   Vitals: /88 (BP Location: Right arm, Patient Position: Sitting, BP Cuff Size: Adult)   Pulse 80   Temp 36.7 °C (98 °F) (Temporal)   Ht 1.499 m (4' 11\")   Wt 69.8 kg (153 lb 14.1 oz)   SpO2 94%     Constitutional:   Body Habitus: Body mass index is 31.08 kg/m².  Cooperation: Fully cooperates with exam  Appearance: Well-groomed no disheveled    Respiratory-  breathing comfortable on room air, no audible wheezing  Cardiovascular- capillary refills less than 2 seconds. No lower extremity edema is noted.   Psychiatric- alert and oriented ×3. Normal affect.    MSK and Neuro: -    Strength is 5 out of 5 in the bilateral lower extremities.  No signs of action over the procedure sites.  Extension rotation " maneuver is positive bilaterally for reproducing back pain.      MEDICAL DECISION MAKING    DATA    Labs:   Lab Results   Component Value Date/Time    SODIUM 136 03/17/2023 12:44 PM    POTASSIUM 5.6 (H) 03/17/2023 12:44 PM    CHLORIDE 107 03/17/2023 12:44 PM    CO2 15 (L) 03/17/2023 12:44 PM    GLUCOSE 229 (H) 03/17/2023 12:44 PM    BUN 62 (H) 03/17/2023 12:44 PM    CREATININE 1.50 (H) 03/17/2023 12:44 PM    CREATININE 0.6 05/11/2009 08:44 AM    BUNCREATRAT 22.0 03/23/2022 03:54 PM        Lab Results   Component Value Date/Time    PROTHROMBTM 13.3 07/20/2012 01:52 PM    INR 1.00 07/20/2012 01:52 PM        Lab Results   Component Value Date/Time    WBC 6.3 03/17/2023 12:44 PM    RBC 4.38 03/17/2023 12:44 PM    HEMOGLOBIN 11.2 (L) 03/17/2023 12:44 PM    HEMATOCRIT 37.1 03/17/2023 12:44 PM    MCV 84.7 03/17/2023 12:44 PM    MCH 25.6 (L) 03/17/2023 12:44 PM    MCHC 30.2 (L) 03/17/2023 12:44 PM    MPV 9.9 03/17/2023 12:44 PM    NEUTSPOLYS 74.60 (H) 03/17/2023 12:44 PM    LYMPHOCYTES 18.10 (L) 03/17/2023 12:44 PM    MONOCYTES 4.60 03/17/2023 12:44 PM    EOSINOPHILS 1.60 03/17/2023 12:44 PM    BASOPHILS 0.60 03/17/2023 12:44 PM    HYPOCHROMIA 1+ 02/18/2014 09:01 AM    ANISOCYTOSIS 1+ 07/20/2012 01:09 PM        Lab Results   Component Value Date/Time    HBA1C 7.6 (H) 02/11/2022 01:08 PM          Imaging:   I personally reviewed following images    MRI lumbar spine 8/31/2021  Multilevel degenerative changes with degenerative disc disease.  Type I Modic changes at L5-S1.  Facet arthropathy worst bilaterally at L4-5 and L5-S1.  No areas of high-grade central canal or neuroforaminal stenosis.        I reviewed the following radiology reports          Results for orders placed during the hospital encounter of 01/19/22    MR-BRAIN-W/O    Impression  No acute intracranial process.    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 This most  likely represents gliosis from previous insult. The cleft does not appear to be lined by cortex, however if there is concern for schizencephaly (which can cause seizures) consider 3-D FSPGR/SPACE T1 imaging to assess whether there is a gray matter lining  to the margins of this cleft.             Results for orders placed in visit on 08/31/21    MR-CERVICAL SPINE-W/O    Impression  Mild multilevel degenerative disc disease. No significant central canal or neural foraminal narrowing.      Results for orders placed in visit on 08/31/21    MR-LUMBAR SPINE-W/O    Impression  Mild multilevel degenerative disc disease and facet degeneration. No significant central canal or neural foraminal narrowing.      Results for orders placed in visit on 08/31/21    MR-THORACIC SPINE-W/O    Impression  1.  Mild degenerative disc disease. No significant central canal or neural foraminal narrowing.    2.  Minimal chronic superior endplate compression fracture of T10. No significant posterior retropulsion.    Results for orders placed in visit on 08/31/21    MR-LUMBAR SPINE-W/O    Impression  Mild multilevel degenerative disc disease and facet degeneration. No significant central canal or neural foraminal narrowing.                                                                       Results for orders placed during the hospital encounter of 07/20/12    CT-ABDOMEN-PELVIS W/ IV Contrast (R/O acute, uncomplicated appendicitis or diverticulitis, bowel ischemia) FOR HIGH BMI PATIENTS    Addendum 7/20/2012  3:22 PM  Gallbladder is not identified with certainty and may be markedly contracted.    Impression  1. Nondistended segments of the appendix as evidence against acute appendicitis.  2. No evidence of bowel obstruction or free fluid.  3. Low-attenuation liver consistent with fatty change and splenomegaly.  No ascites.  4. Bilateral  renal scarring.  No hydronephrosis.  5. 2.6-cm right ovarian cyst.  6. Right adnexal clip.  Possible left clip migration into the anterior abdominal cavity.                     Results for orders placed during the hospital encounter of 21    DX-CERVICAL SPINE-4+ VIEWS    Impression  Degenerative changes of the cervical spine without acute osseous abnormality.              Results for orders placed in visit on 11/10/21    DX-HAND 3+ RIGHT            Results for orders placed during the hospital encounter of 10/27/13    DX-LUMBAR SPINE-2 OR 3 VIEWS    Impression  Negative lumbar spine series.        INTERPRETING LOCATION: [address], [place], [address]   Results for orders placed during the hospital encounter of 21    DX-LUMBAR SPINE-4+ VIEWS    Impression  1.  Grossly stable multilevel degenerative changes of the lumbar spine.  2.  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 This can be a pain generator.              Results for orders placed during the hospital encounter of 10/27/13    DX-THORACIC SPINE-2 VIEWS    Impression  1. No acute appearing compression fracture.    2. Minimal potential or old compression of the anterior aspect of the superior endplate of T10.    3. Thoracolumbar junction scoliosis convex left.            INTERPRETING LOCATION: [address], [place], [address]            DIAGNOSIS   Visit Diagnoses     ICD-10-CM   1. Lumbar spondylosis  M47.816   2. Lumbar degenerative disc disease  M51.36   3. Sacroiliac joint dysfunction of both sides  M53.3         ASSESSMENT and PLAN:     Meggan Caba Olga  1970 female      Meggan was seen today for follow-up.    Diagnoses and all orders for this visit:    Lumbar spondylosis  -     Referral to Physical Medicine Rehab; Future    Lumbar degenerative disc disease    Sacroiliac joint dysfunction of both sides        Status post diagnostic medial branch blocks x2 targeting the BILATERAL L4-5 and L5-S1  facet joints with greater than 80% pain relief during the diagnostic phase.  This represents a positive diagnostic block.    I believe the patient is a good candidate for BILATERAL radiofrequency neurotomy targeting the medial branches innervating the L4-5 and L5-S1 facet joints    The risks benefits and alternatives to this procedure were discussed and the patient wishes to proceed with the procedure. Risks include but are not limited to damage to surrounding structures, infection, bleeding, worsening of pain which can be permanent, weakness which can be permanent. Benefits include pain relief, improved function. Alternatives includes not doing the procedure.         Medications: Continue gabapentin as needed.    Follow up: After the above procedure    Thank you for allowing me to participate in the care of this patient. If you have any questions please not hesitate to contact me.             Please note that this dictation was created using voice recognition software. I have made every reasonable attempt to correct obvious errors but there may be errors of grammar and content that I may have overlooked prior to finalization of this note.      Luis Manuel Coronado MD  Interventional Spine and Sports Physiatry  Physical Medicine and Rehabilitation  [facility]

## 2023-05-23 ENCOUNTER — APPOINTMENT (OUTPATIENT)
Dept: RADIOLOGY | Facility: REHABILITATION | Age: 53
End: 2023-05-23
Attending: PHYSICAL MEDICINE & REHABILITATION
Payer: MEDICAID

## 2023-05-23 ENCOUNTER — APPOINTMENT (OUTPATIENT)
Dept: PHYSICAL THERAPY | Facility: REHABILITATION | Age: 53
End: 2023-05-23
Attending: PHYSICAL MEDICINE & REHABILITATION
Payer: MEDICAID

## 2023-05-23 ENCOUNTER — HOSPITAL ENCOUNTER (OUTPATIENT)
Facility: REHABILITATION | Age: 53
End: 2023-05-23
Attending: PHYSICAL MEDICINE & REHABILITATION | Admitting: PHYSICAL MEDICINE & REHABILITATION
Payer: MEDICAID

## 2023-05-23 VITALS
HEART RATE: 72 BPM | WEIGHT: 150.57 LBS | OXYGEN SATURATION: 100 % | SYSTOLIC BLOOD PRESSURE: 104 MMHG | TEMPERATURE: 97.5 F | RESPIRATION RATE: 16 BRPM | BODY MASS INDEX: 30.36 KG/M2 | DIASTOLIC BLOOD PRESSURE: 61 MMHG | HEIGHT: 59 IN

## 2023-05-23 PROCEDURE — 700111 HCHG RX REV CODE 636 W/ 250 OVERRIDE (IP)

## 2023-05-23 PROCEDURE — 64635 DESTROY LUMB/SAC FACET JNT: CPT

## 2023-05-23 PROCEDURE — 64636 DESTROY L/S FACET JNT ADDL: CPT

## 2023-05-23 PROCEDURE — 99152 MOD SED SAME PHYS/QHP 5/>YRS: CPT

## 2023-05-23 RX ORDER — LIDOCAINE HYDROCHLORIDE 10 MG/ML
INJECTION, SOLUTION EPIDURAL; INFILTRATION; INTRACAUDAL; PERINEURAL
Status: COMPLETED
Start: 2023-05-23 | End: 2023-05-23

## 2023-05-23 RX ORDER — MIDAZOLAM HYDROCHLORIDE 1 MG/ML
INJECTION INTRAMUSCULAR; INTRAVENOUS
Status: COMPLETED
Start: 2023-05-23 | End: 2023-05-23

## 2023-05-23 RX ADMIN — MIDAZOLAM 1 MG: 1 INJECTION, SOLUTION INTRAMUSCULAR; INTRAVENOUS at 08:01

## 2023-05-23 RX ADMIN — FENTANYL CITRATE 50 MCG: 50 INJECTION, SOLUTION INTRAMUSCULAR; INTRAVENOUS at 08:01

## 2023-05-23 RX ADMIN — LIDOCAINE HYDROCHLORIDE 30 ML: 10 INJECTION, SOLUTION EPIDURAL; INFILTRATION; INTRACAUDAL; PERINEURAL at 08:08

## 2023-05-23 ASSESSMENT — PAIN DESCRIPTION - PAIN TYPE
TYPE: CHRONIC PAIN

## 2023-05-23 ASSESSMENT — FIBROSIS 4 INDEX: FIB4 SCORE: 0.73

## 2023-05-23 NOTE — INTERVAL H&P NOTE
H&P reviewed. The patient was examined and there are no changes to the H&P     Lungs clear to auscultation bilaterally.  No abdominal tenderness.  Heart regular rate and rhythm.  Vitals reviewed.    Proceed with the originally scheduled procedure.  The risks, benefits and alternatives were discussed.  The patient understands these.    Pre-Op Diagnosis Codes:     * Lumbar spondylosis [M47.816]  Procedure(s):  BILATERAL radiofrequency neurotomies medial branch targeting the L4-5 and L5-S1 facet joints with fluoroscopic guidance and sedation.     Luis Manuel Coronado MD  Physical Medicine and Rehabilitation  Interventional Spine and Sports Physiatry  [facility]

## 2023-05-23 NOTE — PROGRESS NOTES
0730 Report received from DAVI Fuller RN.  All questions answered.    Dr. Coronado in to see pt.    0831 Pt received to Post Procedure area with updates from procedure RN.  Snack and drink tolerated without C/O N/V.  Dressing clear, dry, no swelling noted.    0843 Pt seen by Dr. Coronado for post procedure evaluation.     0850 Waiting for ride to arrive.  Pt stable.   0902 Pt ambulated without difficulty & meets criteria for DC, accompanied to car and placed in passenger seat.  Discharged to designated .

## 2023-05-23 NOTE — OP REPORT
Patient: Meggan Espinoza 53 y.o. female MRN: [id number]     Date of Service: 5/23/2023     Physician/s: Luis Manuel Coronado MD    Pre-operative Diagnosis: Lumbar spondylosis, facet arthropathy.      Post-operative Diagnosis: Lumbar spondylosis, facet arthropathy.     Procedure: Medial Branch Radiofrequency neurotomy targeting the bilateral L4-5 and L5-S1 facet joint(s) with sedation.     Description of procedure:    The patient was not treated for radiculopathy at this time    The risks, benefits, and alternatives of the procedure were reviewed and discussed with the patient.  Written informed consent was freely obtained. A pre-procedural time-out was conducted by the physician verifying patient’s identity, procedure to be performed, procedure site and side, and allergy verification. Appropriate equipment was determined to be in place for the procedure.     Prior to the procedure, transitional anatomy and imaging was reviewed again and the patient's transitional anatomy was noted.    Moderation sedation was achieved with Versed (1mg) and Fentanyl (50mcg). Monitoring of the patients vital signs and respiratory status was provided by trained independent registered nurse during the entire course of the procedures and under my supervision and recoded in the patient’s medical record. The duration of sedation was over 10 minutes.     The patient's vital signs were carefully monitored before, throughout, and after the procedure.     In the fluoroscopy suite the patient was placed in a prone position, and a pillow was placed underneath the level of the umbilicus. The skin was prepped and draped in the usual sterile fashion. The fluoroscope was placed over the low back at the appropriate angles, and the targets for needle/probe placement were marked. A 25g needle was placed into each of the markings at three levels, and approx 1cc of 1% Lidocaine was injected subcutaneously into the epidermal and dermal layers. The needle was  removed.     A 18 guage, 10 cm RFN cannula with a 10 mm active tip was then placed into the skin using fluoroscopic guidance and advanced with an oblique view towards the intersection of the transverse process and superior articular process S1 facet where the L5 dorsal ramus runs  levels on the right side, where the medial branch runs. The needle/probe tips were then verified by AP, oblique, and lateral views.     A 18 guage, 10 cm RFN cannula with a 10 mm active tip was then placed into the skin using fluoroscopic guidance and advanced with an oblique view towards the intersection of the transverse process and superior articular process L5-S1 facet joint where the L4 medial branch runs levels on the right side, where the medial branch runs. The needle/probe tips were then verified by AP, oblique, and lateral views.     Then A 18 guage, 10 cm RFN cannula with a 10 mm active tip was then placed into the skin using fluoroscopic guidance and advanced with an oblique view towards the intersection of the transverse process and superior articular process L4-5 facet joint where the L3 medial branch runs  levels on the right side, where the medial branch runs. The needle/probe tips were then verified by AP, oblique, and lateral views.         Motor stimulation is used as an extra precaution to ensure the needle tips are off the lumbar nerve roots prior to each lesion. Following negative aspiration, a syringe was prepared with 10 mL of 1% lidocaine.  2mL of this syringe was injected at each level.  The needles are not moved, but fluoroscope guidance is used to ensure the needles have not moved. After a wait period of approximately 2 minutes, a radiofrequency lesion was then created at each level with a temperature of 80 degrees centigrade for 90 seconds.     The probes were adjusted to a 2nd location and images were saved in 2+ views views. Motor testing was done which confirmed no twitching in the leg.  And another  radiofrequency lesion was made of 80 °C for 90 seconds. A 2nd radiofrequency lesion was made with 80°C for 90 seconds.      The cannulas were restyletted, and were then removed intact.     A 18 guage, 10 cm RFN cannula with a 10 mm active tip was then placed into the skin using fluoroscopic guidance and advanced with an oblique view towards the intersection of the transverse process and superior articular process S1 facet where the L5 dorsal ramus runs  levels on the left side, where the medial branch runs. The needle/probe tips were then verified by AP, oblique, and lateral views.     A 18 guage, 10 cm RFN cannula with a 10 mm active tip was then placed into the skin using fluoroscopic guidance and advanced with an oblique view towards the intersection of the transverse process and superior articular process L5-S1 facet joint where the L4 medial branch runs levels on the left side, where the medial branch runs. The needle/probe tips were then verified by AP, oblique, and lateral views.     Then A 18 guage, 10 cm RFN cannula with a 10 mm active tip was then placed into the skin using fluoroscopic guidance and advanced with an oblique view towards the intersection of the transverse process and superior articular process L4-5 facet joint where the L3 medial branch runs  levels on the left side, where the medial branch runs. The needle/probe tips were then verified by AP, oblique, and lateral views.       Motor stimulation is used as an extra precaution to ensure the needle tips are off the lumbar nerve roots prior to each lesion. Following negative aspiration, a syringe was prepared with 10 mL of 1% lidocaine.  2mL of this syringe was injected at each level.  The needles are not moved, but fluoroscope guidance is used to ensure the needles have not moved. After a wait period of approximately 2 minutes, a radiofrequency lesion was then created at each level with a temperature of 80 degrees centigrade for 90 seconds.      The probes were adjusted to a 2nd location and images were saved in 2+ views views. Motor testing was done which confirmed no twitching in the leg.  And another radiofrequency lesion was made of 80 °C for 90 seconds. A 2nd radiofrequency lesion was made with 80°C for 90 seconds.      The cannulas were restyletted, and were then removed intact.     Fluoroscopic images in AP and lateral view were saved prior to each radiofrequency neurotomy.    The patient's back was covered with a 4x4 gauze, the area was cleansed with sterile normal saline, and a dressing was applied. There were no complications noted, the patient remained hemodynamically stable, and the patient tolerated the procedure well. The patient was examined in the postoperative area and her strength exam was identical as prior to the procedure.    Follow-up as scheduled    Luis Manuel Coronado MD  Interventional Spine and Pain  Physical Medicine and Rehabilitation  [facility]            CPT  Radiofrequency ablation (RFA) - lumbar or sacral (1st joint):  35656-93  Radiofrequency ablation (RFA) - lumbar or sacral (each additional joint):  28662-02   moderate procedural sedation first 15 minutes: [id number]

## 2023-05-30 ENCOUNTER — OFFICE VISIT (OUTPATIENT)
Dept: SLEEP MEDICINE | Facility: MEDICAL CENTER | Age: 53
End: 2023-05-30
Attending: STUDENT IN AN ORGANIZED HEALTH CARE EDUCATION/TRAINING PROGRAM
Payer: MEDICAID

## 2023-05-30 ENCOUNTER — APPOINTMENT (OUTPATIENT)
Dept: PHYSICAL THERAPY | Facility: REHABILITATION | Age: 53
End: 2023-05-30
Attending: PHYSICAL MEDICINE & REHABILITATION
Payer: MEDICAID

## 2023-05-30 VITALS
WEIGHT: 153 LBS | DIASTOLIC BLOOD PRESSURE: 70 MMHG | HEART RATE: 87 BPM | RESPIRATION RATE: 14 BRPM | HEIGHT: 59 IN | OXYGEN SATURATION: 95 % | BODY MASS INDEX: 30.84 KG/M2 | SYSTOLIC BLOOD PRESSURE: 106 MMHG

## 2023-05-30 DIAGNOSIS — G47.33 OSA (OBSTRUCTIVE SLEEP APNEA): Primary | ICD-10-CM

## 2023-05-30 DIAGNOSIS — E11.40 TYPE 2 DIABETES MELLITUS WITH DIABETIC NEUROPATHY, WITH LONG-TERM CURRENT USE OF INSULIN (HCC): ICD-10-CM

## 2023-05-30 DIAGNOSIS — R06.89 GASPING FOR BREATH: ICD-10-CM

## 2023-05-30 DIAGNOSIS — Z79.4 TYPE 2 DIABETES MELLITUS WITH DIABETIC NEUROPATHY, WITH LONG-TERM CURRENT USE OF INSULIN (HCC): ICD-10-CM

## 2023-05-30 PROCEDURE — 3074F SYST BP LT 130 MM HG: CPT | Performed by: STUDENT IN AN ORGANIZED HEALTH CARE EDUCATION/TRAINING PROGRAM

## 2023-05-30 PROCEDURE — 99212 OFFICE O/P EST SF 10 MIN: CPT | Performed by: STUDENT IN AN ORGANIZED HEALTH CARE EDUCATION/TRAINING PROGRAM

## 2023-05-30 PROCEDURE — 99203 OFFICE O/P NEW LOW 30 MIN: CPT | Performed by: STUDENT IN AN ORGANIZED HEALTH CARE EDUCATION/TRAINING PROGRAM

## 2023-05-30 PROCEDURE — 3078F DIAST BP <80 MM HG: CPT | Performed by: STUDENT IN AN ORGANIZED HEALTH CARE EDUCATION/TRAINING PROGRAM

## 2023-05-30 ASSESSMENT — FIBROSIS 4 INDEX: FIB4 SCORE: 0.73

## 2023-05-30 NOTE — PROGRESS NOTES
[facility] Consult Note     Date: 5/30/2023 / Time: 9:42 AM      Thank you for requesting a sleep medicine consultation on Meggan Espinoza at the sleep center. Presents today with the chief complaints of gasping in sleep. She is referred by Faustina Painting M.D.  [address] [place] [address] for evaluation and treatment of sleep disorder breathing .     HISTORY OF PRESENT ILLNESS:     Meggan Espinoza is a 53 y.o. female with type 2 diabetes mellitus, GERD, hypertension, dyslipidemia, obesity, migraines and history of obstructive sleep apnea.  Presents to Sleep Clinic for evaluation of obstructive sleep apnea.     She reports for years she has had episodes of gasping in her sleep.  She will sometimes wake up about 3 times a month where she is feeling short of breath and cannot catch her breath when she awakens.  She has had a previous sleep study in January 2016 through [facility] which showed mild obstructive sleep apnea.  She has not been evaluated since.  Is recommended that she consider returning to the sleep lab for a titration study which she never followed through with after last sleep study.    Overall she does find her sleep restorative.  She does have some trouble falling asleep.  However when she is asleep she will awaken between 3 and 4 times a night.  She does have difficulty getting back to sleep.  During the day she can have low energy at times and sleepiness.  She has been told she snores in her sleep.  She has the gasping episodes as well as occasional morning headaches.    As per supplemental questionnaire to be scanned or imported into chart:    [place]ita Sleepiness Score: 10    Sleep Schedule  Bedtime: Weekday 8-9pm Weekend 8-9pm  Wake time: Weekday & Weekend 7am  Sleep-onset latency: 30min -1hr   Awakenings from sleep: 3-4  Difficulty falling back asleep: yes  Bedroom partner: yes  Naps: No     DAYTIME SYMPTOMS:   Excessive daytime sleepiness:  "Yes  Daytime fatigue: Yes  Difficulty concentrating: No   Memory problems: Yes  Irritability:No   Work/school performance issues: No   Sleepiness with driving: No   Caffeine/stimulant use: Yes, diet soda  Alcohol use:No     SLEEP RELATED SYMPTOMS  Snoring: Yes  Witnessed apnea or gasping/choking: Yes, gasping  Dry mouth or mouth breathing: Yes  Sweating: Yes  Teeth grinding/biting: No   Morning headaches: Yes  Refreshed Upon Awakening: Yes     SLEEP RELATED BEHAVIORS:  Parasomnias (walking, talking, eating, violence): No   Leg kicking: No   Restless legs - \"urge to move\": No   Nightmares: No  Recurrent: No   Dream enactment: No      NARCOLEPSY:  Cataplexy: No   Sleep paralysis: No   Sleep attacks: No   Hypnagogic/hypnopompic hallucinations: No     MEDICAL HISTORY  Past Medical History:   Diagnosis Date    Arthritis     back    Bowel habit changes     IBS for years    Diabetes     Dyslipidemia     GERD (gastroesophageal reflux disease)     Heart burn     High cholesterol     HTN (hypertension)     Hypertension     Lower back pain     chronic    Vitamin deficiency         SURGICAL HISTORY  Past Surgical History:   Procedure Laterality Date    RADIO FREQUENCY ABLATION ADDITIONAL LEVEL Bilateral 5/23/2023    Procedure: BILATERAL radiofrequency neurotomies medial branch targeting the L4-5 and L5-S1 facet joints with fluoroscopic guidance and sedation. Plan for 80 °C for 90 seconds for each neurotomy;  Surgeon: Luis Manuel Coronado M.D.;  [facility];  Service: Pain Management    INJ,ANES LUMBAR/CERVICAL Bilateral 5/9/2023    Procedure: Diagnostic medial branch blocks targeting the BILATERAL L4-5 and L5-S1 facet joints with fluoroscopic guidance #2;  Surgeon: Luis Manuel Coronado M.D.;  Location: [facility];  Service: Pain Management    INJ,ANES LUMBAR/CERVICAL Bilateral 5/2/2023    Procedure: Diagnostic medial branch blocks targeting the BILATERAL L4-5 and L5-S1 facet joints with " fluoroscopic guidance #1;  Surgeon: Luis Manuel Coronado M.D.;  Location: [facility];  Service: Pain Management    WA INJECTION,SACROILIAC JOINT Bilateral 10/11/2022    Procedure: BILATERAL sacroiliac joint injection with fluoroscopic guidance;  Surgeon: Luis Manuel Coronado M.D.;  Location: [facility];  Service: Pain Management    WA NEUROPLASTY & OR TRANSPOS MEDIAN NRV CARPAL MICHELLE Left 2/15/2022    Procedure: LEFT OPEN CARPAL TUNNEL RELEASE;  Surgeon: Mariusz Corrigan M.D.;  Location: SURGERY SAME DAY [facility];  Service: Orthopedics    WA SHLDR ARTHROSCOP,PART ACROMIOPLAS Right 9/22/2020    Procedure: DECOMPRESSION, SHOULDER, ARTHROSCOPIC - SUBACROMIAL, LABRAL DEBRIDEMENT;  Surgeon: Toribio Coronado M.D.;  Location: SURGERY [facility];  Service: Orthopedics    PB ARTHROSCOPY SHOULDER SURGICAL BICEPS TENODES* Right 9/22/2020    Procedure: ARTHROSCOPY, SHOULDER, WITH BICEPS TENODESIS;  Surgeon: Toribio Coronado M.D.;  Location: SURGERY [facility];  Service: Orthopedics    OTHER ORTHOPEDIC SURGERY  2017    left carpal tunnel release    CHOLECYSTECTOMY      TONSILLECTOMY      TUBAL COAGULATION LAPAROSCOPIC BILATERAL          FAMILY HISTORY  Family History   Problem Relation Age of Onset    Hypertension Mother     Diabetes Mother        SOCIAL HISTORY  Social History     Socioeconomic History    Marital status:    Tobacco Use    Smoking status: Never    Smokeless tobacco: Never   Vaping Use    Vaping Use: Never used   Substance and Sexual Activity    Alcohol use: No    Drug use: No        Occupation: Not working     CURRENT MEDICATIONS  Current Outpatient Medications   Medication Sig Dispense Refill    Topiramate ER (TROKENDI XR) 200 MG CAPSULE SR 24 HR Take 0.9091 Capsule 24 hour sustained release by mouth every day. 90 Capsule 3    atorvastatin (LIPITOR) 40 MG Tab Take 40 mg by mouth every day.      famotidine (PEPCID) 40 MG Tab Take 40 mg by mouth every day.       Empagliflozin (JARDIANCE) 10 MG Tab tablet Take 10 mg by mouth every day.      Galcanezumab-gnlm (EMGALITY) 120 MG/ML Solution Auto-injector INJECT 1 PEN SUBCUTANEOUSLY AS INSTRUCTED EVERY 4 WEEKS (Patient taking differently: Inject 120 mg under the skin every 4 weeks. INJECT 1 PEN SUBCUTANEOUSLY AS INSTRUCTED EVERY 4 WEEKS) 3 mL 0    Ubrogepant (UBRELVY) 100 MG Tab Take 1 Tablet by mouth as needed (1 tab at headache osnet, repeat in 2 hour prn). 8 Tablet 0    pantoprazole (PROTONIX) 40 MG Tablet Delayed Response Take 1 Tablet by mouth every day.      TRUE METRIX BLOOD GLUCOSE TEST strip USE AS DIRECTED 3-4 TIMES A DAY      LEVEMIR FLEXTOUCH 100 UNIT/ML injection PEN Inject 26 Units under the skin every evening.      colestipol (COLESTID) 1 GM Tab Take 1 g by mouth every day.      [employer] Lancets Ultra-Thin 30G Misc AS DIRECTED FOUR TIMES DAILY CHECK SUGARS 30 DAYS      metformin (GLUCOPHAGE) 1000 MG tablet Take 1,000 mg by mouth 2 times a day.      traZODone (DESYREL) 50 MG Tab Take  mg by mouth every evening. 1-2 tablets      gabapentin (NEURONTIN) 800 MG tablet Take 1 Tab by mouth 3 times a day. 90 Tab 0    Blood Glucose Monitoring Suppl (TRUE METRIX AIR GLUCOSE METER) Device 1 Each by Does not apply route 2 Times a Day. 1 Device 0    Misc. Devices Misc DM lancets Dispense brand covered by patients insurance Testing frequency: Twice dailyDx DM w/o complication. E11.9 100 Each 3    Misc. Devices Misc 1. Glucometer #1   2. Test strips and lancets to test blood sugars qid     #120 120 Each 6    lisinopril (PRINIVIL) 20 MG Tab Take 1 Tab by mouth every day. 30 Tab 11    Misc. Devices MISC Needles for solostar lantus use as directed 120 Each 6     No current facility-administered medications for this visit.       REVIEW OF SYSTEMS  Constitutional: Denies fevers, Denies weight changes  Ears/Nose/Throat/Mouth: Denies nasal congestion or sore throat   Cardiovascular: Denies chest pain  Respiratory: Denies shortness of  "breath, Denies cough  Gastrointestinal/Hepatic: Denies nausea, vomiting  Sleep: see HPI    Physical Examination:  Vitals/ General Appearance:   Weight/BMI: Body mass index is 30.9 kg/m².  Ht 1.499 m (4' 11\")   Wt 69.4 kg (153 lb)   Vitals:    05/30/23 0940   Weight: 69.4 kg (153 lb)   Height: 1.499 m (4' 11\")       Pt. is alert and oriented to time, place and person. Cooperative and in no apparent distress.     Constitutional: Alert, no distress, well-groomed.  Skin: No rashes in visible areas.  Eye: Round. Conjunctiva clear, lids normal. No icterus.   ENT EXAM  Nasal alae/valves collapsible: No   Nasal septum deviation: Yes  Nasal turbinate hypertrophy: Left: Grade 1   Right: Grade 1  Hard palate narrow: No   Hard palate high: No   Soft palate/uvula (Mallampati score): 4  Tongue Scalloping: Yes  Retrognathia: No   Micrognathia: No   Cardiovascular:no murmus/gallops/rubs, normal S1 and S2 heart sounds, regular rate and rhythm  Pulmonary:Clear to auscultation, No wheezes, No crackles.  Neurologic:Awake, alert and oriented x 3, Normal age appropriate gait, No involuntary motions.  Extremities: No clubbing, cyanosis, or edema       ASSESSMENT AND PLAN   Meggan Espinoza is a 53 y.o. female with type 2 diabetes mellitus, GERD, hypertension, dyslipidemia, obesity, migraines and history of obstructive sleep apnea.  Presents to Sleep Clinic for evaluation of obstructive sleep apnea.     1. Meggan Espinoza  has symptoms of Obstructive Sleep Apnea (AGUILAR). Meggan Espinoza has symptoms of snoring, choking/gasping during sleep,  dry mouth, morning headaches.     Pt has risk factors for AGUILAR include obesity, age, and crowded oropharynx.     The pathophysiology of AGUILAR and the increased risk of cardiovascular morbidity from untreated AGUILAR is discussed in detail with the patient. She  also has HTN, GERD, migraines, type 2 diabetes mellitus which can be worsened by AGUILAR.      We have discussed diagnostic options " including in-laboratory, attended polysomnography and home sleep testing. We have also discussed treatment options including airway pressurization, reconstructive otolaryngologic surgery, dental appliances and weight management.     Subsequently,treatment options will be discussed based on the diagnostic study. Meanwhile, She is urged to avoid supine sleep, weight gain and alcoholic beverages since all of these can worsen AGUILAR. She is cautioned against drowsy driving. If She feels sleepy while driving, advised must pull over for a break/nap, rather than persist on the road, in the interest of Pt's own safety and that of others on the road.    Plan  -  She  will be scheduled for an overnight PSG to assess sleep related breathing disorder.  - Follow up 1-2 weeks after sleep study to discuss results and treatment options moving forward   -Advised to reach out via MyChart or by phone with any questions or concerns.     2.  Regarding treatment of other past medical problems and general health maintenance,  Pt is urged to follow up with PCP.      Please note portions of this record was created using voice recognition software. I have made every reasonable attempt to correct obvious errors, but I expect that there are errors of grammar and possibly content I did not discover before finalizing the note.

## 2023-06-06 ENCOUNTER — APPOINTMENT (OUTPATIENT)
Dept: PHYSICAL THERAPY | Facility: REHABILITATION | Age: 53
End: 2023-06-06
Attending: PHYSICAL MEDICINE & REHABILITATION
Payer: MEDICAID

## 2023-06-07 ENCOUNTER — TELEPHONE (OUTPATIENT)
Dept: NEUROLOGY | Facility: MEDICAL CENTER | Age: 53
End: 2023-06-07
Payer: MEDICAID

## 2023-06-07 DIAGNOSIS — G43.119 MIGRAINE WITH AURA, INTRACTABLE, WITHOUT STATUS MIGRAINOSUS: ICD-10-CM

## 2023-06-07 RX ORDER — UBROGEPANT 100 MG/1
1 TABLET ORAL PRN
Qty: 8 TABLET | Refills: 0 | COMMUNITY
Start: 2023-06-07 | End: 2023-06-13

## 2023-06-07 NOTE — PROGRESS NOTES
Patient showed unannounced to the office today asking for samples of Ubrelvy.  We have been providing the drug since her insurance does not cover it.  Thus, 8 tablets were provided.  She was given this number when she was last seen in December, 2022.

## 2023-06-07 NOTE — TELEPHONE ENCOUNTER
06/07/23  Patient came in to the office to  2 Ubrelvy samples Lot # [id number] Exp 06/2024 [facility] Order put in by Dr Smith

## 2023-06-11 DIAGNOSIS — G43.119 MIGRAINE WITH AURA, INTRACTABLE, WITHOUT STATUS MIGRAINOSUS: ICD-10-CM

## 2023-06-13 ENCOUNTER — TELEPHONE (OUTPATIENT)
Dept: NEUROLOGY | Facility: MEDICAL CENTER | Age: 53
End: 2023-06-13
Payer: MEDICAID

## 2023-06-13 RX ORDER — UBROGEPANT 100 MG/1
TABLET ORAL
Qty: 10 TABLET | Refills: 5 | Status: SHIPPED
Start: 2023-06-13 | End: 2023-09-08 | Stop reason: SDUPTHER

## 2023-06-13 NOTE — TELEPHONE ENCOUNTER
Ubrelvy 100 MG Tabs    Request rec'd via [employer], MARGIE Miller paid copay $0.00 #10/30 DS Max 34 DS notifying liaison Estela Medina or Outreach - 06/13/2023 2:58pm

## 2023-06-14 ENCOUNTER — TELEPHONE (OUTPATIENT)
Dept: PHYSICAL MEDICINE AND REHAB | Facility: MEDICAL CENTER | Age: 53
End: 2023-06-14
Payer: MEDICAID

## 2023-06-14 NOTE — TELEPHONE ENCOUNTER
"Bilat RadioFreq Neurotomies Medial Branch Target L4-5 and L5-S1 Facet Joints W/ Fluoro and Sedation    Called for post-sp check-up. Pt reported the following regarding the procedure site:    Change in pain?: Pt states having good and bad days. \"Some days no pain, other days it hurts.\" Pt states the pain is about the same as before, not any worse.    Concerns?: N/A    Confirmed [facility] appt?: 7/6  "

## 2023-06-19 ENCOUNTER — TELEPHONE (OUTPATIENT)
Dept: NEUROLOGY | Facility: MEDICAL CENTER | Age: 53
End: 2023-06-19
Payer: MEDICAID

## 2023-06-19 NOTE — TELEPHONE ENCOUNTER
Emgality 120mg/ml SOAJ    Request rec'd via MSOT, MARGIE Miller rejecting DUR 88 Ther Dup, filling pharmacy can override or call HD for codes to override, s/b $0.00 #1ml/30 DS, notifying liaison Valentin Medina - 06/19/2023 3:57pm

## 2023-06-20 PROCEDURE — RXMED WILLOW AMBULATORY MEDICATION CHARGE: Performed by: PSYCHIATRY & NEUROLOGY

## 2023-06-21 ENCOUNTER — PHARMACY VISIT (OUTPATIENT)
Dept: PHARMACY | Facility: MEDICAL CENTER | Age: 53
End: 2023-06-21
Payer: COMMERCIAL

## 2023-06-21 ENCOUNTER — SLEEP STUDY (OUTPATIENT)
Dept: SLEEP MEDICINE | Facility: MEDICAL CENTER | Age: 53
End: 2023-06-21
Attending: STUDENT IN AN ORGANIZED HEALTH CARE EDUCATION/TRAINING PROGRAM
Payer: MEDICAID

## 2023-06-21 ENCOUNTER — DOCUMENTATION (OUTPATIENT)
Dept: PHARMACY | Facility: MEDICAL CENTER | Age: 53
End: 2023-06-21
Payer: MEDICAID

## 2023-06-21 DIAGNOSIS — Z79.4 TYPE 2 DIABETES MELLITUS WITH DIABETIC NEUROPATHY, WITH LONG-TERM CURRENT USE OF INSULIN (HCC): ICD-10-CM

## 2023-06-21 DIAGNOSIS — E11.40 TYPE 2 DIABETES MELLITUS WITH DIABETIC NEUROPATHY, WITH LONG-TERM CURRENT USE OF INSULIN (HCC): ICD-10-CM

## 2023-06-21 DIAGNOSIS — R06.89 GASPING FOR BREATH: ICD-10-CM

## 2023-06-21 DIAGNOSIS — G47.33 OSA (OBSTRUCTIVE SLEEP APNEA): ICD-10-CM

## 2023-06-21 PROCEDURE — 95811 POLYSOM 6/>YRS CPAP 4/> PARM: CPT | Performed by: STUDENT IN AN ORGANIZED HEALTH CARE EDUCATION/TRAINING PROGRAM

## 2023-06-21 NOTE — PROGRESS NOTES
**TRF Onboarding**  Diagnosis: Migraine with aura, intractable, without status migrainosus [G43.119]      Drug & Non-Drug Allergies:    - EMR Reviewed. No concerning drug or non-drug allergies.                                                                                          Drug Therapy (name/formulation/dose/route of admin/frequency): Enbrel 120mg/mL pen, inject 1 pen SQ every 4 weeks as instructed   EMR Reviewed   - Dose Appropriateness (Y/N): Y   - Renal or Hepatic Adjustments (Y/N): N   - Any comorbidities, PMH, precautions, or contraindications that pose a medication safety concern? (Y/N): N   - Any relevant lab work needed that affects the initial start date? (Y/N): N     - List Past Treatments: Trokendi, gabapentin    - List DDI’s:     - Cat D: Jardiance + Levemir = increased risk of hypoglycemia, monitor BG   - Patient’s ability to self-administer medication     - No issues identified per EMR       List Goals of Therapy:      - To reduce migraine frequency, duration, and severity  - To improve acute medication responsiveness and reduce the need for acute attacks  - To identify and modify migraine triggers    Patient filling Emgality with [facility], treatment experienced, opted out of clinical services. Clinical pharmacists available to assist with support if needed.

## 2023-06-22 NOTE — PROCEDURES
MONTAGE: Standard  STUDY TYPE: Split Night  RECORDING TECHNIQUE:   After the scalp was prepared, gold plated electrodes were applied to the scalp according to the International 10-20 System. EEG (electroencephalogram) was continuously monitored from the O1-M2, O2-M1, C3-M2, C4-M1, F3-M2, and F4-M1. EOGs (electrooculograms) were monitored by electrodes placed at the left and right outer canthi. Chin EMG (electromyogram) was monitored by electrodes placed on the mentalis and sub-mentalis muscles. Nasal and oral airflow were monitored using a triple port thermocouple as well as oronasal pressure transducer. Respiratory effort was measured by inductive plethysmography technology employing abdominal and thoracic belts. Blood oxygen saturation and pulse were monitored by pulse oximetry. Heart rhythm was monitored by surface electrocardiogram. Leg EMG was studied using surface electrodes placed on left and right anterior tibialis. A microphone was used to monitor tracheal sounds and snoring. Body position was monitored and documented by technician observation.   SCORING CRITERIA:   A modification of the AASM manual for scoring of sleep and associated events was used. Obstructive apneas were scored by cessation of airflow for at least 10 seconds with continuing respiratory effort. Central apneas were scored by cessation of airflow for at least 10 seconds with no respiratory effort. Hypopneas were scored by a 30% or more reduction in airflow for at least 10 seconds accompanied by arterial oxygen desaturation of 3% or an arousal. For CMS (Medicare) patients, per AASM rule 1B, hypopneas are scored by 30% with mild reduction in airflow for at least 10 seconds accompanied by arterial saturation decreased at 4%.  DIAGNOSTIC  Study start time was 08:29:49 PM. Diagnostic recording time was 220 minutes with a total sleep time of 127 minutes resulting in a sleep efficiency of 57.60%%. Sleep latency from the start of the study was 17  minutes and the latency from sleep to REM was 00 minutes. In total,17 arousals were scored for an arousal index of 8.0.  Respiratory:  There were a total of 1 apneas consisting of 1 obstructive apneas, 0 mixed apneas, and 0 central apneas. A total of 36 hypopneas were scored. The apnea index was 0.47 per hour and the hypopnea index was 17.01 per hour resulting in an overall 4% AHI of 17.48. AHI during REM was 0.0 and AHI while supine was 48.00.  Oximetry:  There was a mean oxygen saturation of 89.0%. The minimum oxygen saturation during NREM sleep was 78.0% and in REM was --%. Time spent during sleep with oxygen saturations <88% was 91.6 minutes.   Cardiac:  The highest heart rate seen while awake was 92 BPM while the highest heart rate during sleep was 87 BPM with an average sleeping heart rate of 71 BPM.  Limb Movements:  There were a total of 43 PLMs during sleep, which resulted in a PLM index of 20.3. There were 4 PLMs associated with arousals which resulted in a PLMS arousal index of 1.9.  TREATMENT:  Treatment recording time was 5h 51.0m (351 minutes) with a total sleep time of 5h 23.5m (323 minutes) resulting in a sleep efficiency of 92.2%. Sleep latency from the start of treatment was 00 minutes and REM latency from sleep onset was 2h 44.5m. The patient had 33 arousals in total for an arousal index of 6.1.  Respiratory:   There were 0 apneas in total consisting of 0 obstructive apneas, 0 central apneas, and 0 mixed apneas for an apnea index of 0.00. The patient had 31 hypopneas in total, which resulted in a hypopnea index of 5.75. The overall AHI was 5.75, with a REM AHI of 0.00, and a supine AHI of 10.80.   Oximetry:  The mean SaO2 during treatment was 92.0%. The minimum oxygen saturation in NREM was 79.0 % and in REM was 91.0%. Patient spent 19.5 minutes of TST with SaO2 <88%.  Cardiac:  The highest heart rate during sleep was 87 BPM with an average sleeping heart rate of 71BPM.  Limb Movements:  There  were a total of 55 PLMS during titration sleep time that resulted in an index of 10.2. There were 11 PLMS associated with arousals. This resulted in a PLM arousal index of 2.0.  Titration: CPAP was tried from 6 cm H2O to 9 cm H2O.  This was a fully attended sleep study. This test was technically adequate. This patient was titrated on CPAP starting at 6 cm of water pressure. Patient was titrated up to 9 cm of water pressure. Patient did best at 9 cm of water pressure. Patient spent 198 minutes at that pressure and the AHI was 1.21 which is considered Treated obstructive sleep apnea.     Impression:  1.  Moderate obstructive sleep apnea with overall AHI 17.5 events an hour  2.  Significant nocturnal hypoxia was seen during diagnostic portion with minimum oxygen saturation of 78%, time at or below 88% saturation 91.6 minutes  3.  Patient met criteria for split-night protocol was placed on CPAP  4.  Respiratory events improved with CPAP therapy  5.  Oxygen saturations improved with CPAP and increasing pressures    Recommendations:  I recommend Auto CPAP of 8-12 cm .  Patient used a small Simplus mask during night of study.     I also recommend 30 day compliance download to assess the efficacy to the recommended pressure, measure leak, apnea hypopnea index and compliance for further outpatient monitoring and management of PAP therapy. In some cases alternative treatment options may be proven effective in resolving sleep apnea. These options include upper airway surgery, the use of a dental orthotic, weight loss, or positional therapy. Clinical correlation is required. In general patients with sleep apnea are advised to avoid alcohol, sedatives and not to operate a motor vehicle while drowsy.  Untreated sleep apnea increases the risk for cardiovascular and neurovascular disease.

## 2023-07-06 ENCOUNTER — OFFICE VISIT (OUTPATIENT)
Dept: PHYSICAL MEDICINE AND REHAB | Facility: MEDICAL CENTER | Age: 53
End: 2023-07-06
Payer: MEDICAID

## 2023-07-06 DIAGNOSIS — M47.816 LUMBAR SPONDYLOSIS: ICD-10-CM

## 2023-07-13 ENCOUNTER — OFFICE VISIT (OUTPATIENT)
Dept: SLEEP MEDICINE | Facility: MEDICAL CENTER | Age: 53
End: 2023-07-13
Attending: NURSE PRACTITIONER
Payer: MEDICAID

## 2023-07-13 VITALS
HEART RATE: 89 BPM | RESPIRATION RATE: 16 BRPM | WEIGHT: 157 LBS | BODY MASS INDEX: 31.65 KG/M2 | DIASTOLIC BLOOD PRESSURE: 60 MMHG | OXYGEN SATURATION: 98 % | HEIGHT: 59 IN | SYSTOLIC BLOOD PRESSURE: 108 MMHG

## 2023-07-13 DIAGNOSIS — G47.34 NOCTURNAL HYPOXIA: ICD-10-CM

## 2023-07-13 DIAGNOSIS — Z78.9 NONSMOKER: ICD-10-CM

## 2023-07-13 DIAGNOSIS — G47.33 OBSTRUCTIVE SLEEP APNEA: ICD-10-CM

## 2023-07-13 PROCEDURE — 3074F SYST BP LT 130 MM HG: CPT | Performed by: NURSE PRACTITIONER

## 2023-07-13 PROCEDURE — 3078F DIAST BP <80 MM HG: CPT | Performed by: NURSE PRACTITIONER

## 2023-07-13 PROCEDURE — 99213 OFFICE O/P EST LOW 20 MIN: CPT | Performed by: NURSE PRACTITIONER

## 2023-07-13 PROCEDURE — 99212 OFFICE O/P EST SF 10 MIN: CPT | Performed by: NURSE PRACTITIONER

## 2023-07-13 ASSESSMENT — FIBROSIS 4 INDEX: FIB4 SCORE: 0.73

## 2023-07-13 NOTE — PROGRESS NOTES
Chief Complaint   Patient presents with    Follow-Up     Apnea // Last Seen 5/30/2023     Results     Sleep study 6/21/2023        HPI:  Meggan Espinoza is a 53 y.o. year old female here today for follow-up on sleep study results.  Last OV 5/30/23 with Dr. Medina     She is accompanied by her .    Symptoms consist of significant daytime fatigue and even napping on her lunch breaks.  She notes being very tired and sometimes will miss dinner due to it.    PSG split night 6/21/23 noted moderate AGUILAR with overall AHI 17.5/hr, persistent hypoxia with O2 ebonie 78% and <88% for 91.6min with improvement on CPAP on 9cm with reduced AHI 1.21/hr. sleep efficiency improved significantly on CPAP.  She tolerated mask.  Ongoing low oxygen levels even on CPAP at night.  Recommend Autocpap 8-12cm with roni implus mask.  Reviewed in detail with patient she is amenable to starting CPAP with O2 bleed in.      ROS: As per HPI and otherwise negative if not stated.    Past Medical History:   Diagnosis Date    Arthritis     back    Bowel habit changes     IBS for years    Diabetes     Dyslipidemia     GERD (gastroesophageal reflux disease)     Heart burn     High cholesterol     HTN (hypertension)     Hypertension     Lower back pain     chronic    Vitamin deficiency        Past Surgical History:   Procedure Laterality Date    RADIO FREQUENCY ABLATION ADDITIONAL LEVEL Bilateral 5/23/2023    Procedure: BILATERAL radiofrequency neurotomies medial branch targeting the L4-5 and L5-S1 facet joints with fluoroscopic guidance and sedation. Plan for 80 °C for 90 seconds for each neurotomy;  Surgeon: Luis Manuel Coronado M.D.;  [facility];  Service: Pain Management    INJ,ANES LUMBAR/CERVICAL Bilateral 5/9/2023    Procedure: Diagnostic medial branch blocks targeting the BILATERAL L4-5 and L5-S1 facet joints with fluoroscopic guidance #2;  Surgeon: Luis Manuel Coronado M.D.;  [facility];  Service: Pain Management    INJ,ANES LUMBAR/CERVICAL Bilateral 5/2/2023    Procedure: Diagnostic medial branch blocks targeting the BILATERAL L4-5 and L5-S1 facet joints with fluoroscopic guidance #1;  Surgeon: Luis Manuel Coronado M.D.;  [facility];  Service: Pain Management    NV INJECTION,SACROILIAC JOINT Bilateral 10/11/2022    Procedure: BILATERAL sacroiliac joint injection with fluoroscopic guidance;  Surgeon: Luis Manuel Coronado M.D.;  [facility];  Service: Pain Management    NV NEUROPLASTY & OR TRANSPOS MEDIAN NRV CARPAL MICHELLE Left 2/15/2022    Procedure: LEFT OPEN CARPAL TUNNEL RELEASE;  Surgeon: Mariusz Corrigan M.D.;  Location: SUR[facility]ERY [facility];  Service: Orthopedics    NV SHLDR ARTHROSCOP,PART ACROMIOPLAS Right 9/22/2020    Procedure: DECOMPRESSION, SHOULDER, ARTHROSCOPIC - SUBACROMIAL, LABRAL DEBRIDEMENT;  Surgeon: Toribio Coronado M.D.;  Location: SURGERY [facility];  Service: Orthopedics    PB ARTHROSCOPY SHOULDER SURGICAL BICEPS TENODES* Right 9/22/2020    Procedure: ARTHROSCOPY, SHOULDER, WITH BICEPS TENODESIS;  Surgeon: Toribio Coronado M.D.;  Location: SURGERY [facility];  Service: Orthopedics    OTHER ORTHOPEDIC SURGERY  2017    left carpal tunnel release    CHOLECYSTECTOMY      TONSILLECTOMY      TUBAL COAGULATION LAPAROSCOPIC BILATERAL         Family History   Problem Relation Age of Onset    Hypertension Mother     Diabetes Mother        Social History     Socioeconomic History    Marital status:      Spouse name: Not on file    Number of children: Not on file    Years of education: Not on file    Highest education level: Not on file   Occupational History    Not on file   Tobacco Use    Smoking status: Never    Smokeless tobacco: Never   Vaping Use    Vaping Use: Never used   Substance and Sexual Activity    Alcohol use: No    Drug use: No    Sexual activity: Not on file   Other Topics Concern    Not  "on file   Social History Narrative    Not on file     Social Determinants of Health     Financial Resource Strain: Not on file   Food Insecurity: Not on file   Transportation Needs: Not on file   Physical Activity: Not on file   Stress: Not on file   Social Connections: Not on file   Intimate Partner Violence: Not on file   Housing Stability: Not on file       Allergies as of 07/13/2023 - Reviewed 07/13/2023   Allergen Reaction Noted    Nkda [no known drug allergy]  07/08/2009        Vitals:  /60 (BP Location: Left arm, Patient Position: Sitting, BP Cuff Size: Adult)   Pulse 89   Resp 16   Ht 1.499 m (4' 11\")   Wt 71.2 kg (157 lb)   SpO2 98%     Current medications as of today   Current Outpatient Medications   Medication Sig Dispense Refill    Galcanezumab-gnlm (EMGALITY) 120 MG/ML Solution Auto-injector INJECT 1 PEN SUBCUTANEOUSLY AS INSTRUCTED EVERY 4 WEEKS 1 mL 11    UBRELVY 100 MG Tab TAKE 1 TABLET BY MOUTH AT ONSET OF HEADACHE REPEAT IN 2 HOURS AS NEEDED 10 Tablet 5    Topiramate ER (TROKENDI XR) 200 MG CAPSULE SR 24 HR Take 0.9091 Capsule 24 hour sustained release by mouth every day. 90 Capsule 3    atorvastatin (LIPITOR) 40 MG Tab Take 40 mg by mouth every day.      famotidine (PEPCID) 40 MG Tab Take 40 mg by mouth every day.      Empagliflozin (JARDIANCE) 10 MG Tab tablet Take 10 mg by mouth every day.      pantoprazole (PROTONIX) 40 MG Tablet Delayed Response Take 1 Tablet by mouth every day.      TRUE METRIX BLOOD GLUCOSE TEST strip USE AS DIRECTED 3-4 TIMES A DAY      LEVEMIR FLEXTOUCH 100 UNIT/ML injection PEN Inject 26 Units under the skin every evening.      colestipol (COLESTID) 1 GM Tab Take 1 g by mouth every day.      [employer] Lancets Ultra-Thin 30G Misc AS DIRECTED FOUR TIMES DAILY CHECK SUGARS 30 DAYS      metformin (GLUCOPHAGE) 1000 MG tablet Take 1,000 mg by mouth 2 times a day.      traZODone (DESYREL) 50 MG Tab Take  mg by mouth every evening. 1-2 tablets      gabapentin " (NEURONTIN) 800 MG tablet Take 1 Tab by mouth 3 times a day. 90 Tab 0    Blood Glucose Monitoring Suppl (TRUE METRIX AIR GLUCOSE METER) Device 1 Each by Does not apply route 2 Times a Day. 1 Device 0    Misc. Devices Misc DM lancets Dispense brand covered by patients insurance Testing frequency: Twice dailyDx DM w/o complication. E11.9 100 Each 3    Misc. Devices Misc 1. Glucometer #1   2. Test strips and lancets to test blood sugars qid     #120 120 Each 6    lisinopril (PRINIVIL) 20 MG Tab Take 1 Tab by mouth every day. 30 Tab 11    Misc. Devices MISC Needles for solostar lantus use as directed 120 Each 6     No current facility-administered medications for this visit.         Physical Exam:   Gen:           Alert and oriented, No apparent distress. Mood and affect appropriate, normal interaction with examiner.  Eyes:          PERRL, EOM intact, sclere white, conjunctive moist.  Ears:          Not examined.   Hearing:     Grossly intact.  Nose:          Normal, no lesions or deformities.  Dentition:    Not examined.   Oropharynx:   Not examined.   Mallampati Classification: Not examined.   Neck:        Supple, trachea midline, no masses.  Respiratory Effort: No intercostal retractions or use of accessory muscles.   Lung Auscultation:      Clear to auscultation bilaterally; no rales, rhonchi or wheezing.  CV:            Regular rate and rhythm. No murmurs, rubs or gallops.  Abd:           Not examined.   Lymphadenopathy: Not examined.  Gait and Station: Normal.  Digits and Nails: No clubbing, cyanosis, petechiae, or nodes.   Cranial Nerves: II-XII grossly intact.  Skin:        No rashes, lesions or ulcers noted.               Ext:           No cyanosis or edema.      Assessment:  1. Obstructive sleep apnea  HEIGHT AND WEIGHT    DME CPAP    DME O2 New Set Up      2. Nocturnal hypoxia  DME O2 New Set Up      3. BMI 31.0-31.9,adult  HEIGHT AND WEIGHT      4. Nonsmoker            Immunizations:    Flu:recommend in the  fall  Pneumovax 23:2013  Prevnar 13:not due   PCV 20: 2/23/23  COVID-19: 2/23/23    Plan:  Patient has moderate obstructive sleep apnea.  Recommend starting auto CPAP 8 to 12 cm with 2 L oxygen bleed in.  Recommend once acclimated to therapy to complete overnight oximetry on CPAP only to see if supplemental O2 is still required.   DME APAP 8-12cm with 2LPM O2 bleed in   DME O2  O2 bleed and therapy for night time with CPAP due to hypoxia noted on CPAP titration still.  Encourage weight loss or healthy eating rather activity.  Follow primary care for the health concerns.  Follow-up in 3 months for first compliance check, sooner if needed.    Please note that this dictation was created using voice recognition software. I have made every reasonable attempt to correct obvious errors, but it is possible there are errors of grammar and possibly content that I did not discover before finalizing the note.

## 2023-07-17 ENCOUNTER — OFFICE VISIT (OUTPATIENT)
Dept: PHYSICAL MEDICINE AND REHAB | Facility: MEDICAL CENTER | Age: 53
End: 2023-07-17
Payer: MEDICAID

## 2023-07-17 VITALS
SYSTOLIC BLOOD PRESSURE: 98 MMHG | TEMPERATURE: 98.2 F | HEIGHT: 59 IN | BODY MASS INDEX: 31.2 KG/M2 | DIASTOLIC BLOOD PRESSURE: 60 MMHG | WEIGHT: 154.76 LBS | OXYGEN SATURATION: 94 % | HEART RATE: 86 BPM

## 2023-07-17 DIAGNOSIS — M47.812 CERVICAL SPONDYLOSIS: ICD-10-CM

## 2023-07-17 DIAGNOSIS — M51.36 LUMBAR DEGENERATIVE DISC DISEASE: ICD-10-CM

## 2023-07-17 DIAGNOSIS — G89.29 CHRONIC LEFT SHOULDER PAIN: ICD-10-CM

## 2023-07-17 DIAGNOSIS — M19.012 ARTHRITIS OF LEFT ACROMIOCLAVICULAR JOINT: ICD-10-CM

## 2023-07-17 DIAGNOSIS — G89.29 CHRONIC NECK PAIN: ICD-10-CM

## 2023-07-17 DIAGNOSIS — M54.2 CHRONIC NECK PAIN: ICD-10-CM

## 2023-07-17 DIAGNOSIS — M54.50 CHRONIC BILATERAL LOW BACK PAIN WITHOUT SCIATICA: ICD-10-CM

## 2023-07-17 DIAGNOSIS — G89.29 CHRONIC BILATERAL LOW BACK PAIN WITHOUT SCIATICA: ICD-10-CM

## 2023-07-17 DIAGNOSIS — M53.3 SACROILIAC JOINT DYSFUNCTION OF BOTH SIDES: ICD-10-CM

## 2023-07-17 DIAGNOSIS — M79.10 MYALGIA: ICD-10-CM

## 2023-07-17 DIAGNOSIS — M25.512 CHRONIC LEFT SHOULDER PAIN: ICD-10-CM

## 2023-07-17 DIAGNOSIS — M47.816 LUMBAR SPONDYLOSIS: ICD-10-CM

## 2023-07-17 PROCEDURE — 1125F AMNT PAIN NOTED PAIN PRSNT: CPT | Performed by: PHYSICAL MEDICINE & REHABILITATION

## 2023-07-17 PROCEDURE — 3078F DIAST BP <80 MM HG: CPT | Performed by: PHYSICAL MEDICINE & REHABILITATION

## 2023-07-17 PROCEDURE — 3074F SYST BP LT 130 MM HG: CPT | Performed by: PHYSICAL MEDICINE & REHABILITATION

## 2023-07-17 PROCEDURE — 99214 OFFICE O/P EST MOD 30 MIN: CPT | Performed by: PHYSICAL MEDICINE & REHABILITATION

## 2023-07-17 ASSESSMENT — PAIN SCALES - GENERAL: PAINLEVEL: 6=MODERATE PAIN

## 2023-07-17 ASSESSMENT — FIBROSIS 4 INDEX: FIB4 SCORE: 0.73

## 2023-07-17 ASSESSMENT — PATIENT HEALTH QUESTIONNAIRE - PHQ9: CLINICAL INTERPRETATION OF PHQ2 SCORE: 0

## 2023-07-17 NOTE — PROGRESS NOTES
Follow up patient note  Interventional spine and Pain  Physiatry (physical medicine and  Rehabilitation)       Chief complaint:   Chief Complaint   Patient presents with    Follow-Up     Lumbar spondylosis          HISTORY    Please see new patient note by Dr Coronado,  for more details.     HPI  Patient identification: Meggan Espinoza ,  1970,   With Diagnoses of Sacroiliac joint dysfunction of both sides, Lumbar spondylosis, Lumbar degenerative disc disease, Chronic bilateral low back pain without sciatica, Cervical spondylosis, Myalgia, Chronic neck pain, Arthritis of left acromioclavicular joint, and Chronic left shoulder pain were pertinent to this visit.     Procedures:  10/11/2022 bilateral sacroiliac joint injection 30% improved  Post procedure  2023 diagnostic medial branch blocks targeting the bilateral L4-5 and L5-S1 facet joints Preprocedure pain 8/10 NRS postprocedure pain 4/10 NRS during the diagnostic phase.  The patient also had greater than 50% functional improvement with her ability for lifting, improved distance and walking, improved standing tolerance.    2023 diagnostic medial branch blocks targeting the bilateral L4-5 and L5-S1 facet joints preprocedure pain 6/ 10 NRS postprocedure pain 0/10 NRS during the diagnostic phase.  2023 medial branch radiofrequency neurotomy targeting the bilateral 4 5 and L5-S1 facet joints with sedation NRS 6/10 preprocedure, NRS 0/10 of index pain postprocedure.    The patient had excellent improvement in pain of the area in the lumbar spine after the medial branch radiofrequency neurotomy.  She continues to have pain around the bilateral sacroiliac joints.    She is going on walks with her dogs at least once daily and sometimes twice daily which is a significant improvement in function.  This pain is 6 out of 10 in intensity.  Constant.  Worse with hip movements and bed mobility.    She continues to have neck pain which is bilateral, mild  to moderate in intensity, nonradiating.    Medications tried include NSAIDs, acetaminophen, gabapentin for pain and for muscle relaxer, Meloxicam, oxycodone, tramadol     ROS Red Flags :   Fever, Chills, Sweats: Denies  Involuntary Weight Loss: Denies  Bowel/Bladder Incontinence: Denies  Saddle Anesthesia: Denies        PMHx:   Past Medical History:   Diagnosis Date    Arthritis     back    Bowel habit changes     IBS for years    Diabetes     Dyslipidemia     GERD (gastroesophageal reflux disease)     Heart burn     High cholesterol     HTN (hypertension)     Hypertension     Lower back pain     chronic    Vitamin deficiency        PSHx:   Past Surgical History:   Procedure Laterality Date    RADIO FREQUENCY ABLATION ADDITIONAL LEVEL Bilateral 5/23/2023    Procedure: BILATERAL radiofrequency neurotomies medial branch targeting the L4-5 and L5-S1 facet joints with fluoroscopic guidance and sedation. Plan for 80 °C for 90 seconds for each neurotomy;  Surgeon: Luis Manuel Coronado M.D.;  [facility];  Service: Pain Management    INJ,ANES LUMBAR/CERVICAL Bilateral 5/9/2023    Procedure: Diagnostic medial branch blocks targeting the BILATERAL L4-5 and L5-S1 facet joints with fluoroscopic guidance #2;  Surgeon: Luis Manuel Coronado M.D.;  [facility];  Service: Pain Management    INJ,ANES LUMBAR/CERVICAL Bilateral 5/2/2023    Procedure: Diagnostic medial branch blocks targeting the BILATERAL L4-5 and L5-S1 facet joints with fluoroscopic guidance #1;  Surgeon: Luis Manuel Coronado M.D.;  [facility];  Service: Pain Management    NM INJECTION,SACROILIAC JOINT Bilateral 10/11/2022    Procedure: BILATERAL sacroiliac joint injection with fluoroscopic guidance;  Surgeon: Luis Manuel Coronado M.D.;  [facility];  Service: Pain Management    NM NEUROPLASTY & OR TRANSPOS MEDIAN NRV CARPAL MICHELLE Left 2/15/2022    Procedure: LEFT OPEN  CARPAL TUNNEL RELEASE;  Surgeon: Mariusz Corrigan M.D.;  Location: SURGERY SAME DAY [facility];  Service: Orthopedics    AK SHLDR ARTHROSCOP,PART ACROMIOPLAS Right 9/22/2020    Procedure: DECOMPRESSION, SHOULDER, ARTHROSCOPIC - SUBACROMIAL, LABRAL DEBRIDEMENT;  Surgeon: Toribio Coronado M.D.;  Location: SURGERY [facility];  Service: Orthopedics    PB ARTHROSCOPY SHOULDER SURGICAL BICEPS TENODES* Right 9/22/2020    Procedure: ARTHROSCOPY, SHOULDER, WITH BICEPS TENODESIS;  Surgeon: Toribio Coronado M.D.;  Location: SURGERY [facility];  Service: Orthopedics    OTHER ORTHOPEDIC SURGERY  2017    left carpal tunnel release    CHOLECYSTECTOMY      TONSILLECTOMY      TUBAL COAGULATION LAPAROSCOPIC BILATERAL         Family history   Family History   Problem Relation Age of Onset    Hypertension Mother     Diabetes Mother          Medications:   Outpatient Medications Marked as Taking for the 7/17/23 encounter (Office Visit) with Luis Manuel Coronado M.D.   Medication Sig Dispense Refill    Galcanezumab-gnlm (EMGALITY) 120 MG/ML Solution Auto-injector INJECT 1 PEN SUBCUTANEOUSLY AS INSTRUCTED EVERY 4 WEEKS 1 mL 11    UBRELVY 100 MG Tab TAKE 1 TABLET BY MOUTH AT ONSET OF HEADACHE REPEAT IN 2 HOURS AS NEEDED 10 Tablet 5    Topiramate ER (TROKENDI XR) 200 MG CAPSULE SR 24 HR Take 0.9091 Capsule 24 hour sustained release by mouth every day. 90 Capsule 3    atorvastatin (LIPITOR) 40 MG Tab Take 40 mg by mouth every day.      famotidine (PEPCID) 40 MG Tab Take 40 mg by mouth every day.      Empagliflozin (JARDIANCE) 10 MG Tab tablet Take 10 mg by mouth every day.      pantoprazole (PROTONIX) 40 MG Tablet Delayed Response Take 1 Tablet by mouth every day.      TRUE METRIX BLOOD GLUCOSE TEST strip USE AS DIRECTED 3-4 TIMES A DAY      LEVEMIR FLEXTOUCH 100 UNIT/ML injection PEN Inject 26 Units under the skin every evening.      colestipol (COLESTID) 1 GM Tab Take 1 g by mouth every day.      [employer] Lancets Ultra-Thin 30G Misc AS  DIRECTED FOUR TIMES DAILY CHECK SUGARS 30 DAYS      metformin (GLUCOPHAGE) 1000 MG tablet Take 1,000 mg by mouth 2 times a day.      traZODone (DESYREL) 50 MG Tab Take  mg by mouth every evening. 1-2 tablets      gabapentin (NEURONTIN) 800 MG tablet Take 1 Tab by mouth 3 times a day. 90 Tab 0    Blood Glucose Monitoring Suppl (TRUE METRIX AIR GLUCOSE METER) Device 1 Each by Does not apply route 2 Times a Day. 1 Device 0    Misc. Devices Misc DM lancets Dispense brand covered by patients insurance Testing frequency: Twice dailyDx DM w/o complication. E11.9 100 Each 3    Misc. Devices Misc 1. Glucometer #1   2. Test strips and lancets to test blood sugars qid     #120 120 Each 6    lisinopril (PRINIVIL) 20 MG Tab Take 1 Tab by mouth every day. 30 Tab 11    Misc. Devices MISC Needles for solostar lantus use as directed 120 Each 6        Current Outpatient Medications on File Prior to Visit   Medication Sig Dispense Refill    Galcanezumab-gnlm (EMGALITY) 120 MG/ML Solution Auto-injector INJECT 1 PEN SUBCUTANEOUSLY AS INSTRUCTED EVERY 4 WEEKS 1 mL 11    UBRELVY 100 MG Tab TAKE 1 TABLET BY MOUTH AT ONSET OF HEADACHE REPEAT IN 2 HOURS AS NEEDED 10 Tablet 5    Topiramate ER (TROKENDI XR) 200 MG CAPSULE SR 24 HR Take 0.9091 Capsule 24 hour sustained release by mouth every day. 90 Capsule 3    atorvastatin (LIPITOR) 40 MG Tab Take 40 mg by mouth every day.      famotidine (PEPCID) 40 MG Tab Take 40 mg by mouth every day.      Empagliflozin (JARDIANCE) 10 MG Tab tablet Take 10 mg by mouth every day.      pantoprazole (PROTONIX) 40 MG Tablet Delayed Response Take 1 Tablet by mouth every day.      TRUE METRIX BLOOD GLUCOSE TEST strip USE AS DIRECTED 3-4 TIMES A DAY      LEVEMIR FLEXTOUCH 100 UNIT/ML injection PEN Inject 26 Units under the skin every evening.      colestipol (COLESTID) 1 GM Tab Take 1 g by mouth every day.      [employer] Lancets Ultra-Thin 30G Misc AS DIRECTED FOUR TIMES DAILY CHECK SUGARS 30 DAYS      metformin  (GLUCOPHAGE) 1000 MG tablet Take 1,000 mg by mouth 2 times a day.      traZODone (DESYREL) 50 MG Tab Take  mg by mouth every evening. 1-2 tablets      gabapentin (NEURONTIN) 800 MG tablet Take 1 Tab by mouth 3 times a day. 90 Tab 0    Blood Glucose Monitoring Suppl (TRUE METRIX AIR GLUCOSE METER) Device 1 Each by Does not apply route 2 Times a Day. 1 Device 0    Misc. Devices Misc DM lancets Dispense brand covered by patients insurance Testing frequency: Twice dailyDx DM w/o complication. E11.9 100 Each 3    Misc. Devices Misc 1. Glucometer #1   2. Test strips and lancets to test blood sugars qid     #120 120 Each 6    lisinopril (PRINIVIL) 20 MG Tab Take 1 Tab by mouth every day. 30 Tab 11    Misc. Devices MISC Needles for solostar lantus use as directed 120 Each 6     No current facility-administered medications on file prior to visit.         Allergies:   Allergies   Allergen Reactions    Nkda [No Known Drug Allergy]        Social Hx:   Social History     Socioeconomic History    Marital status:      Spouse name: Not on file    Number of children: Not on file    Years of education: Not on file    Highest education level: Not on file   Occupational History    Not on file   Tobacco Use    Smoking status: Never    Smokeless tobacco: Never   Vaping Use    Vaping Use: Never used   Substance and Sexual Activity    Alcohol use: No    Drug use: No    Sexual activity: Not on file   Other Topics Concern    Not on file   Social History Narrative    Not on file     Social Determinants of Health     Financial Resource Strain: Not on file   Food Insecurity: Not on file   Transportation Needs: Not on file   Physical Activity: Not on file   Stress: Not on file   Social Connections: Not on file   Intimate Partner Violence: Not on file   Housing Stability: Not on file         EXAMINATION     Physical Exam:   Vitals: BP 98/60 (BP Location: Right arm, Patient Position: Sitting, BP Cuff Size: Adult)   Pulse 86   Temp  "36.8 °C (98.2 °F) (Temporal)   Ht 1.499 m (4' 11\")   Wt 70.2 kg (154 lb 12.2 oz)   SpO2 94%     Constitutional:   Body Habitus: Body mass index is 31.26 kg/m².  Cooperation: Fully cooperates with exam  Appearance: Well-groomed no disheveled    Respiratory-  breathing comfortable on room air, no audible wheezing  Cardiovascular- capillary refills less than 2 seconds. No lower extremity edema is noted.   Psychiatric- alert and oriented ×3. Normal affect.    MSK and Neuro: -    Sacroiliac joint maneuvers  Thigh thrust positive bilaterally    Binh's positive bilaterally    Gaenslen's positive bilaterally    SI joint compression positive bilaterally    Tenderness to palpation of sacroiliac joint: positive bilaterally       Thoracic/Lumbar Spine/Sacral Spine/Hips   There are no signs of infection around the injection sites.   full  active range of motion with flexion, lateral flexion, and rotation bilaterally.   There is full  active range of motion with lumbar extension.    There is no  pain with lumbar extension.   There is no pain with facet loading maneuver (extension rotation) with axial low back pain on the BILATERAL side(s) L4-5 and L5-S1      Palpation:   No tenderness to palpation in midline at T1-T12 levels. No tenderness to palpation in the left and right of the midline T1-L5, NEGATIVE for tenderness to palpation to the para-midline region in the lower lumbar levels.  palpation over SI joint: positive bilaterally    palpation in hip or over the gluteus medius tendon insertion: negative bilaterally      Lumbar spine Special tests  Neuro tension  Straight leg test negative bilaterally    Slump test negative bilaterally      Key points for the international standards for neurological classification of spinal cord injury (ISNCSCI) to light touch.     Dermatome R L                                      L2 2 2   L3 2 2   L4 2 2   L5 2 2   S1 2 2   S2 2 2         Motor Exam Lower Extremities    ? Myotome R L   Hip " flexion L2 5 5   Knee extension L3 5 5   Ankle dorsiflexion L4 5 5   Toe extension L5 5 5   Ankle plantarflexion S1 5 5           MEDICAL DECISION MAKING    DATA    Labs:   Lab Results   Component Value Date/Time    SODIUM 136 03/17/2023 12:44 PM    POTASSIUM 5.6 (H) 03/17/2023 12:44 PM    CHLORIDE 107 03/17/2023 12:44 PM    CO2 15 (L) 03/17/2023 12:44 PM    GLUCOSE 229 (H) 03/17/2023 12:44 PM    BUN 62 (H) 03/17/2023 12:44 PM    CREATININE 1.50 (H) 03/17/2023 12:44 PM    CREATININE 0.6 05/11/2009 08:44 AM    BUNCREATRAT 22.0 03/23/2022 03:54 PM        Lab Results   Component Value Date/Time    PROTHROMBTM 13.3 07/20/2012 01:52 PM    INR 1.00 07/20/2012 01:52 PM        Lab Results   Component Value Date/Time    WBC 6.3 03/17/2023 12:44 PM    RBC 4.38 03/17/2023 12:44 PM    HEMOGLOBIN 11.2 (L) 03/17/2023 12:44 PM    HEMATOCRIT 37.1 03/17/2023 12:44 PM    MCV 84.7 03/17/2023 12:44 PM    MCH 25.6 (L) 03/17/2023 12:44 PM    MCHC 30.2 (L) 03/17/2023 12:44 PM    MPV 9.9 03/17/2023 12:44 PM    NEUTSPOLYS 74.60 (H) 03/17/2023 12:44 PM    LYMPHOCYTES 18.10 (L) 03/17/2023 12:44 PM    MONOCYTES 4.60 03/17/2023 12:44 PM    EOSINOPHILS 1.60 03/17/2023 12:44 PM    BASOPHILS 0.60 03/17/2023 12:44 PM    HYPOCHROMIA 1+ 02/18/2014 09:01 AM    ANISOCYTOSIS 1+ 07/20/2012 01:09 PM        Lab Results   Component Value Date/Time    HBA1C 7.6 (H) 02/11/2022 01:08 PM          Imaging:   I personally reviewed following images    MRI lumbar spine 8/31/2021  Multilevel degenerative changes with degenerative disc disease.  Type I Modic changes at L5-S1.  Facet arthropathy worst bilaterally at L4-5 and L5-S1.  No areas of high-grade central canal or neuroforaminal stenosis.        I reviewed the following radiology reports          Results for orders placed during the hospital encounter of 01/19/22    MR-BRAIN-W/O    Impression  No acute intracranial process.    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 This most  likely represents gliosis from previous insult. The cleft does not appear to be lined by cortex, however if there is concern for schizencephaly (which can cause seizures) consider 3-D FSPGR/SPACE T1 imaging to assess whether there is a gray matter lining  to the margins of this cleft.             Results for orders placed in visit on 08/31/21    MR-CERVICAL SPINE-W/O    Impression  Mild multilevel degenerative disc disease. No significant central canal or neural foraminal narrowing.      Results for orders placed in visit on 08/31/21    MR-LUMBAR SPINE-W/O    Impression  Mild multilevel degenerative disc disease and facet degeneration. No significant central canal or neural foraminal narrowing.      Results for orders placed in visit on 08/31/21    MR-THORACIC SPINE-W/O    Impression  1.  Mild degenerative disc disease. No significant central canal or neural foraminal narrowing.    2.  Minimal chronic superior endplate compression fracture of T10. No significant posterior retropulsion.    Results for orders placed in visit on 08/31/21    MR-LUMBAR SPINE-W/O    Impression  Mild multilevel degenerative disc disease and facet degeneration. No significant central canal or neural foraminal narrowing.                                                                       Results for orders placed during the hospital encounter of 07/20/12    CT-ABDOMEN-PELVIS W/ IV Contrast (R/O acute, uncomplicated appendicitis or diverticulitis, bowel ischemia) FOR HIGH BMI PATIENTS    Addendum 7/20/2012  3:22 PM  Gallbladder is not identified with certainty and may be markedly contracted.    Impression  1. Nondistended segments of the appendix as evidence against acute appendicitis.  2. No evidence of bowel obstruction or free fluid.  3. Low-attenuation liver consistent  with fatty change and splenomegaly.  No ascites.  4. Bilateral renal scarring.  No hydronephrosis.  5. 2.6-cm right ovarian cyst.  6. Right adnexal clip.  Possible left clip migration into the anterior abdominal cavity.                     Results for orders placed during the hospital encounter of 21    DX-CERVICAL SPINE-4+ VIEWS    Impression  Degenerative changes of the cervical spine without acute osseous abnormality.              Results for orders placed in visit on 11/10/21    DX-HAND 3+ RIGHT            Results for orders placed during the hospital encounter of 10/27/13    DX-LUMBAR SPINE-2 OR 3 VIEWS    Impression  Negative lumbar spine series.        INTERPRETING LOCATION: [address], [place], [address]   Results for orders placed during the hospital encounter of 21    DX-LUMBAR SPINE-4+ VIEWS    Impression  1.  Grossly stable multilevel degenerative changes of the lumbar spine.  2.  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 This can be a pain generator.              Results for orders placed during the hospital encounter of 10/27/13    DX-THORACIC SPINE-2 VIEWS    Impression  1. No acute appearing compression fracture.    2. Minimal potential or old compression of the anterior aspect of the superior endplate of T10.    3. Thoracolumbar junction scoliosis convex left.            INTERPRETING LOCATION: [address], [place], [address]            DIAGNOSIS   Visit Diagnoses     ICD-10-CM   1. Sacroiliac joint dysfunction of both sides  M53.3   2. Lumbar spondylosis  M47.816   3. Lumbar degenerative disc disease  M51.36   4. Chronic bilateral low back pain without sciatica  M54.50    G89.29   5. Cervical spondylosis  M47.812   6. Myalgia  M79.10   7. Chronic neck pain  M54.2    G89.29   8. Arthritis of left acromioclavicular joint  M19.012   9. Chronic left shoulder pain  M25.512    G89.29         ASSESSMENT and PLAN:     Meggan Espinoza  1970  female      Meggan was seen today for follow-up.    Diagnoses and all orders for this visit:    Sacroiliac joint dysfunction of both sides  -     Referral to Physical Therapy    Lumbar spondylosis  -     Referral to Physical Therapy    Lumbar degenerative disc disease  -     Referral to Physical Therapy    Chronic bilateral low back pain without sciatica  -     Referral to Physical Therapy    Cervical spondylosis  -     Referral to Physical Therapy    Myalgia  -     Referral to Physical Therapy    Chronic neck pain  -     Referral to Physical Therapy    Arthritis of left acromioclavicular joint  -     Referral to Physical Therapy    Chronic left shoulder pain  -     Referral to Physical Therapy      The patient had excellent improvement of the index pain after the medial branch radiofrequency neurotomy with 100% pain relief of this pain.    However she still has pain around the bilateral sacroiliac joints.  This is causing pain and functional deficits.  There is also likely decompensation and she is having a difficult time with her home exercise program.  She would like to resume physical therapy and I think this is reasonable.    I also prescribed additional exercises for her back, hips and legs and gave directions on how to do these.    Medications: Continue gabapentin        Follow up: 6 months or sooner as needed    Thank you for allowing me to participate in the care of this patient. If you have any questions please not hesitate to contact me.             Please note that this dictation was created using voice recognition software. I have made every reasonable attempt to correct obvious errors but there may be errors of grammar and content that I may have overlooked prior to finalization of this note.      Luis Manuel Coronado MD  Interventional Spine and Sports Physiatry  Physical Medicine and Rehabilitation  [facility]

## 2023-07-21 PROCEDURE — RXMED WILLOW AMBULATORY MEDICATION CHARGE: Performed by: PSYCHIATRY & NEUROLOGY

## 2023-07-24 ENCOUNTER — PHARMACY VISIT (OUTPATIENT)
Dept: PHARMACY | Facility: MEDICAL CENTER | Age: 53
End: 2023-07-24
Payer: COMMERCIAL

## 2023-08-14 ENCOUNTER — TELEPHONE (OUTPATIENT)
Dept: PHARMACY | Facility: MEDICAL CENTER | Age: 53
End: 2023-08-14
Payer: MEDICAID

## 2023-08-14 PROCEDURE — RXMED WILLOW AMBULATORY MEDICATION CHARGE: Performed by: PSYCHIATRY & NEUROLOGY

## 2023-08-15 NOTE — TELEPHONE ENCOUNTER
Contact:      Phone number: [phone], Mobile    Name of person spoken with and relationship to patient: Meggan Espinoza, Self   Patient’s Adherence:      How patient is doing on medication: Good    How many missed doses and reason: 0    Any new medications: No    Any new conditions: No    Any new allergies: No    Any new side effects: No    Any new diagnoses: No    How many doses remainin    Did patient want to speak with pharmacist: No  Delivery:      Delivery date and method:  via     Needs by Date:     Signature required: No    Any additional details for : Afternoon delivery  Teach Appointment Date: 2023  Shipping Address: [address] [place] [address], [address] [place] [id number]  Medication (name, strength and dose): Emgality 120mg/mL-Inject 1 pen [id number]  Copay: $0  Payment Method: N/A, $0 copay   Supplies: None  Additional Information: Next call date: .

## 2023-08-16 ENCOUNTER — PHYSICAL THERAPY (OUTPATIENT)
Dept: PHYSICAL THERAPY | Facility: REHABILITATION | Age: 53
End: 2023-08-16
Attending: PHYSICAL MEDICINE & REHABILITATION
Payer: MEDICAID

## 2023-08-16 DIAGNOSIS — G89.29 CHRONIC LEFT SHOULDER PAIN: ICD-10-CM

## 2023-08-16 DIAGNOSIS — M19.012 ARTHRITIS OF LEFT ACROMIOCLAVICULAR JOINT: ICD-10-CM

## 2023-08-16 DIAGNOSIS — M47.816 LUMBAR SPONDYLOSIS: ICD-10-CM

## 2023-08-16 DIAGNOSIS — M25.512 CHRONIC LEFT SHOULDER PAIN: ICD-10-CM

## 2023-08-16 DIAGNOSIS — G89.29 CHRONIC NECK PAIN: ICD-10-CM

## 2023-08-16 DIAGNOSIS — M47.812 CERVICAL SPONDYLOSIS: ICD-10-CM

## 2023-08-16 DIAGNOSIS — M54.50 CHRONIC BILATERAL LOW BACK PAIN WITHOUT SCIATICA: ICD-10-CM

## 2023-08-16 DIAGNOSIS — M51.36 LUMBAR DEGENERATIVE DISC DISEASE: ICD-10-CM

## 2023-08-16 DIAGNOSIS — M54.2 CHRONIC NECK PAIN: ICD-10-CM

## 2023-08-16 DIAGNOSIS — G89.29 CHRONIC BILATERAL LOW BACK PAIN WITHOUT SCIATICA: ICD-10-CM

## 2023-08-16 DIAGNOSIS — M53.3 SACROILIAC JOINT DYSFUNCTION OF BOTH SIDES: ICD-10-CM

## 2023-08-16 DIAGNOSIS — M79.10 MYALGIA: ICD-10-CM

## 2023-08-16 PROCEDURE — 97161 PT EVAL LOW COMPLEX 20 MIN: CPT

## 2023-08-16 ASSESSMENT — ENCOUNTER SYMPTOMS
PAIN SCALE AT LOWEST: 5
PAIN SCALE: 6
PAIN SCALE AT HIGHEST: 9

## 2023-08-16 NOTE — OP THERAPY EVALUATION
Outpatient Physical Therapy  INITIAL EVALUATION    [address]  [address]  [place] [address]  Phone:  [phone]  Fax:  [phone]    Date of Evaluation: 08/16/2023    Patient: Meggan Espinoza  YOB: 1970  MRN: [id number]     Referring Provider: Luis Manuel Coronado M.D.  [address]  [address]  [place] [address]   Referring Diagnosis Spondylosis without myelopathy or radiculopathy, lumbar region [M47.816];Other intervertebral disc degeneration, lumbar region [M51.36];Sacrococcygeal disorders, not elsewhere classified [M53.3];Spondylosis without myelopathy or radiculopathy, cervical region [M47.812];Myalgia, unspecified site [M79.10];Chronic neck pain [M54.2, G89.29];Primary osteoarthritis, left shoulder [M19.012];Chronic left shoulder pain [M25.512, G89.29];Chronic bilateral low back pain without sciatica [M54.50, G89.29]     Time Calculation  Start time: 0115  Stop time: 0205 Time Calculation (min): 50 minutes         Chief Complaint: No chief complaint on file.    Visit Diagnoses     ICD-10-CM   1. Lumbar spondylosis  M47.816   2. Lumbar degenerative disc disease  M51.36   3. Sacroiliac joint dysfunction of both sides  M53.3   4. Cervical spondylosis  M47.812   5. Myalgia  M79.10   6. Chronic neck pain  M54.2    G89.29   7. Arthritis of left acromioclavicular joint  M19.012   8. Chronic left shoulder pain  M25.512    G89.29   9. Chronic bilateral low back pain without sciatica  M54.50    G89.29       Date of onset of impairment: 8/21/2022    Subjective   History of Present Illness:     History of chief complaint:  Patient reports 2 year onset lbp with bilateral l.e. complaints that end at her feet L>R.  Patient reports multiple injections to her low back w/o any benefit.  Patient stated that Dr. Coronado thinks she is getting better but she does not feel that she is any better.       PMHX: (+) for HTN, elevated cholesterol and IDDM that is  presently being managed with mdications    Prior level of function:  Unemployed    Pain:     Current pain ratin    At best pain ratin    At worst pain ratin    Location:  Bilateral lbp at waistline that lateralizes hip/thigh down to her feet on the lateral border--intermittent bilateral paresthesia    Aggravating factors:  Sleep up 2/night  Carry grocer bags from car to home w/o pain  Walk more than 15'  Sit more than 30' w/o pain    OSWESTRY: 21        Past Medical History:   Diagnosis Date    Arthritis     back    Bowel habit changes     IBS for years    Diabetes     Dyslipidemia     GERD (gastroesophageal reflux disease)     Heart burn     High cholesterol     HTN (hypertension)     Hypertension     Lower back pain     chronic    Vitamin deficiency      Past Surgical History:   Procedure Laterality Date    RADIO FREQUENCY ABLATION ADDITIONAL LEVEL Bilateral 2023    Procedure: BILATERAL radiofrequency neurotomies medial branch targeting the L4-5 and L5-S1 facet joints with fluoroscopic guidance and sedation. Plan for 80 °C for 90 seconds for each neurotomy;  Surgeon: Luis Manuel Coronado M.D.;  [facility];  Service: Pain Management    INJ,ANES LUMBAR/CERVICAL Bilateral 2023    Procedure: Diagnostic medial branch blocks targeting the BILATERAL L4-5 and L5-S1 facet joints with fluoroscopic guidance #2;  Surgeon: Luis Manuel Coronado M.D.;  [facility];  Service: Pain Management    INJ,ANES LUMBAR/CERVICAL Bilateral 2023    Procedure: Diagnostic medial branch blocks targeting the BILATERAL L4-5 and L5-S1 facet joints with fluoroscopic guidance #1;  Surgeon: Luis Manuel Coronado M.D.;  [facility];  Service: Pain Management    ME INJECTION,SACROILIAC JOINT Bilateral 10/11/2022    Procedure: BILATERAL sacroiliac joint injection with fluoroscopic guidance;  Surgeon: Luis Manuel Coronado M.D.;  [facility];  Service: Pain Management    SD NEUROPLASTY & OR TRANSPOS MEDIAN NRV CARPAL MICHELLE Left 2/15/2022    Procedure: LEFT OPEN CARPAL TUNNEL RELEASE;  Surgeon: Mariusz Corrigan M.D.;  Location: SURGERY [facility] DAY [facility];  Service: Orthopedics    SD SHLDR ARTHROSCOP,PART ACROMIOPLAS Right 9/22/2020    Procedure: DECOMPRESSION, SHOULDER, ARTHROSCOPIC - SUBACROMIAL, LABRAL DEBRIDEMENT;  Surgeon: Toribio Coronado M.D.;  Location: SURGERY [facility];  Service: Orthopedics    PB ARTHROSCOPY SHOULDER SURGICAL BICEPS TENODES* Right 9/22/2020    Procedure: ARTHROSCOPY, SHOULDER, WITH BICEPS TENODESIS;  Surgeon: Toribio Coronado M.D.;  Location: SURGERY [facility];  Service: Orthopedics    OTHER ORTHOPEDIC SURGERY  2017    left carpal tunnel release    CHOLECYSTECTOMY      TONSILLECTOMY      TUBAL COAGULATION LAPAROSCOPIC BILATERAL         Precautions:       Objective   Observation and functional movement:  Forward head trunk, decreased lordsis , noted R>L femoral anti-version     Sensation and reflexes:     DTR l4 L:1+ R:absent, S1 Bilaterally absent  Clonus: bilateral, 0 beats  B&B: denied  Saddle paresthesia: denied  Cough/sneeze : denied  Mmt: 4/5 throughout l2-s1 bilaterally  except RL3 4-/5 L: L5 4-/5          Therapeutic Treatments and Modalities:     Therapeutic Treatment and Modalities Summary: SEIL x 5'// centralized and abolished L leg pain    REIL x 10//reproted no back, leg or buttock symptoms  Armani  reviewed  Issued h/o for HEP with ball  - instructed centralization  Vs peripheralization and importance of abolishing leg  -instructed sit/stand \"tall\"  SEIL w/ mhp x 10'--h/o  hep// patient denied any lbp, buttock or leg pain     Pt. To perfrom HEP to manage sx and purchase a ball prior to next visit       Time-based treatments/modalities:           Assessment, Response and Plan:   Assessment details:  Patient presents with L/S derangement  syndrome with  poor posture  and poor body awareness with " decreased lordosis.   Neurological exam is WNL except noted weakness of R L3 and L: 4  with diminished L L4 DTR and absent bialteral S1. Patient presents with axial load and flexion sensitivities. Patient centralized with Braeden extension protocol. Patient should do well if compliant with POC.   Prognosis: good    Goals:   Short Term Goals:   Sleep up 2/night  Carry grocer bags from car to home w/o pain  Walk more than 15'  Sit more than 30' w/o pain    OSWESTRY: <18      Long Term Goals:      Walk more than 45'  Sit more than 60' w/o pain  Run/jog across street quickly w/o pain  Wash feet/legs in shower w/o pain  OSWESTRY:<10  Long term goal time span:  6-8 weeks    Plan:   Therapy options:  Physical therapy treatment to continue  Planned therapy interventions:  Therapeutic Exercise (CPT 38600), E Stim Unattended (CPT 03515) and Neuromuscular Re-education (CPT 89578)  Frequency:  2x week  Duration in weeks:  6  Duration in visits:  12  Plan details:  Core  stab, IASTM and cupping with movement, roller progression, scap stab  (97110 x 2, 97014x1, 97112  x 1)for 2/wk x 6wks       Functional Assessment Used        Referring provider co-signature:  I have reviewed this plan of care and my co-signature certifies the need for services.    Certification Period: 08/16/2023 to  10/18/23    Physician Signature: ________________________________ Date: ______________

## 2023-08-18 ENCOUNTER — PHARMACY VISIT (OUTPATIENT)
Dept: PHARMACY | Facility: MEDICAL CENTER | Age: 53
End: 2023-08-18
Payer: COMMERCIAL

## 2023-09-08 ENCOUNTER — TELEPHONE (OUTPATIENT)
Dept: PHARMACY | Facility: MEDICAL CENTER | Age: 53
End: 2023-09-08
Payer: MEDICAID

## 2023-09-08 ENCOUNTER — APPOINTMENT (OUTPATIENT)
Dept: PHYSICAL THERAPY | Facility: REHABILITATION | Age: 53
End: 2023-09-08
Attending: PHYSICAL MEDICINE & REHABILITATION
Payer: MEDICAID

## 2023-09-08 DIAGNOSIS — G43.119 MIGRAINE WITH AURA, INTRACTABLE, WITHOUT STATUS MIGRAINOSUS: ICD-10-CM

## 2023-09-08 PROCEDURE — RXMED WILLOW AMBULATORY MEDICATION CHARGE: Performed by: PSYCHIATRY & NEUROLOGY

## 2023-09-08 RX ORDER — TOPIRAMATE 200 MG/1
200 CAPSULE, EXTENDED RELEASE ORAL DAILY
Qty: 90 CAPSULE | Refills: 3 | Status: SHIPPED | OUTPATIENT
Start: 2023-09-08

## 2023-09-08 RX ORDER — UBROGEPANT 100 MG/1
TABLET ORAL
Qty: 10 TABLET | Refills: 5 | Status: SHIPPED | OUTPATIENT
Start: 2023-09-08

## 2023-09-08 NOTE — TELEPHONE ENCOUNTER
Received request via: Pharmacy    Was the patient seen in the last year in this department? Yes, This pts last appt was 12/12/2022    Does the patient have an active prescription (recently filled or refills available) for medication(s) requested? No    Does the patient have [facility] Plus and need 100 day supply (blood pressure, diabetes and cholesterol meds only)? Medication is not for cholesterol, blood pressure or diabetes      Please advise: The change is not a change in medication but a change in pharmacy! And on the topiramate will you change the dosage amount? Its currently saying to take 0.9091 mgs; I'm not sure if that's the right dosage, if it is please disregard that.

## 2023-09-09 NOTE — TELEPHONE ENCOUNTER
Contact:      Phone number: [phone], Mobile    Name of person spoken with and relationship to patient: Meggan Carmichael, Self   Patient’s Adherence:      How patient is doing on medication: Good    How many missed doses and reason: 0    Any new medications: No    Any new conditions: Yes, Sleep apnea with c-pap machine at night    Any new allergies: No    Any new side effects: No    Any new diagnoses: No    How many doses remainin    Did patient want to speak with pharmacist: No  Delivery:      Delivery date and method:  via     Needs by Date:     Signature required: No    Any additional details for : None  Teach Appointment Date: 2023  Shipping Address: [address] [place] [address], [address] [place] [address]  Medication (name, strength and dose): Emgality 120mg/mL  Copay: $0  Payment Method: N/A, $0 copay  Supplies: None  Additional Information: Pt request a prescription for Topiramate XR 200mg and Ubrelvy 100mg be requested from Dr. Smith for future fills at [facility]. Next call date: 10/13.

## 2023-09-13 ENCOUNTER — PHYSICAL THERAPY (OUTPATIENT)
Dept: PHYSICAL THERAPY | Facility: REHABILITATION | Age: 53
End: 2023-09-13
Attending: PHYSICAL MEDICINE & REHABILITATION
Payer: MEDICAID

## 2023-09-13 ENCOUNTER — PHARMACY VISIT (OUTPATIENT)
Dept: PHARMACY | Facility: MEDICAL CENTER | Age: 53
End: 2023-09-13
Payer: COMMERCIAL

## 2023-09-13 DIAGNOSIS — M54.50 CHRONIC BILATERAL LOW BACK PAIN WITHOUT SCIATICA: ICD-10-CM

## 2023-09-13 DIAGNOSIS — G89.29 CHRONIC BILATERAL LOW BACK PAIN WITHOUT SCIATICA: ICD-10-CM

## 2023-09-13 PROCEDURE — 97110 THERAPEUTIC EXERCISES: CPT

## 2023-09-13 NOTE — OP THERAPY DAILY TREATMENT
"  Outpatient Physical Therapy  DAILY TREATMENT     [facility] [address].  [address]  [place] [address]  Phone:  [phone]  Fax:  [phone]    Date: 09/13/2023    Patient: Meggan Espinoza  YOB: 1970  MRN: [id number]     Time Calculation    Start time: 0850  Stop time: 0940 Time Calculation (min): 50 minutes         Chief Complaint: No chief complaint on file.    Visit #: 1    SUBJective  Patient started c-pap and reports more pain the mornng over the past week      OBJECTIVE:        Therapeutic Treatments and Modalities:     Therapeutic Treatment and Modalities Summary: REIL x 10 x 3-- reil x 15 with PT o/p// \" no pain\"  0/10  Ball bridge 1'47\", 2'8\"  Ball roll in/out with BFB for focus on hip dissociation  Superman x 1'  Ball jwall squats to fatigue >15    Time-based treatments/modalities:           Pain rating (1-10) before treatment:  5  Pain rating (1-10) after treatment:  0    ASSESSMENT:   Poor hep compliance since last visit . Patient reprorted abolition of sc with ex progression in clinic    PLAN/RECOMMENDATIONS:   Core stab, hip dissociation          "

## 2023-09-15 ENCOUNTER — APPOINTMENT (OUTPATIENT)
Dept: PHYSICAL THERAPY | Facility: REHABILITATION | Age: 53
End: 2023-09-15
Attending: PHYSICAL MEDICINE & REHABILITATION
Payer: MEDICAID

## 2023-09-22 ENCOUNTER — APPOINTMENT (OUTPATIENT)
Dept: PHYSICAL THERAPY | Facility: REHABILITATION | Age: 53
End: 2023-09-22
Attending: PHYSICAL MEDICINE & REHABILITATION
Payer: MEDICAID

## 2023-09-29 ENCOUNTER — PHYSICAL THERAPY (OUTPATIENT)
Dept: PHYSICAL THERAPY | Facility: REHABILITATION | Age: 53
End: 2023-09-29
Attending: PHYSICAL MEDICINE & REHABILITATION
Payer: MEDICAID

## 2023-09-29 DIAGNOSIS — M51.36 LUMBAR DEGENERATIVE DISC DISEASE: ICD-10-CM

## 2023-09-29 PROCEDURE — 97110 THERAPEUTIC EXERCISES: CPT

## 2023-09-29 PROCEDURE — 97014 ELECTRIC STIMULATION THERAPY: CPT

## 2023-09-29 NOTE — OP THERAPY DAILY TREATMENT
"  Outpatient Physical Therapy  DAILY TREATMENT     [facility] [address].  [address]  [place] [address]  Phone:  [phone]  Fax:  [phone]    Date: 09/29/2023    Patient: Meggan Espinoza  YOB: 1970  MRN: [id number]     Time Calculation    Start time: 1020  Stop time: 1106 Time Calculation (min): 46 minutes         Chief Complaint: No chief complaint on file.    Visit #: 2    SUBJective  Muscles were sore after lat visit .  Patient reports that she does her ex 3/day and feels  a little stronger ea. Day.    OBJECTIVE:        Therapeutic Treatments and Modalities:     Therapeutic Treatment and Modalities Summary: SEIL// no pain  REIL x 10 x 3-- reil // \" no pain\"  0/10  Ball bridge 2'30\"  Ball roll in/out with BFB for focus on hip dissociation  Superman x 1'30\"  Russian 10/10 to l/s with ball roller and bfb      Time-based treatments/modalities:    Physical Therapy Timed Treatment Charges  Therapeutic exercise minutes (CPT 92904): 30 minutes      Pain rating (1-10) before treatment:  bilateral lbp 5/10  Pain rating (1-10) after treatment:  0\" no pain    ASSESSMENT:   Fair-  hep compliance w/ fair - body awareness with moderate VC for posture throughout treatment. . Patient reprorted abolition of sx with ex progression in clinic    PLAN/RECOMMENDATIONS:   Core stab, hip dissociation          "

## 2023-10-06 ENCOUNTER — APPOINTMENT (OUTPATIENT)
Dept: PHYSICAL THERAPY | Facility: REHABILITATION | Age: 53
End: 2023-10-06
Attending: PHYSICAL MEDICINE & REHABILITATION
Payer: MEDICAID

## 2023-10-10 ENCOUNTER — TELEPHONE (OUTPATIENT)
Dept: PHARMACY | Facility: MEDICAL CENTER | Age: 53
End: 2023-10-10
Payer: MEDICAID

## 2023-10-10 PROCEDURE — RXMED WILLOW AMBULATORY MEDICATION CHARGE: Performed by: PSYCHIATRY & NEUROLOGY

## 2023-10-11 ENCOUNTER — PHARMACY VISIT (OUTPATIENT)
Dept: PHARMACY | Facility: MEDICAL CENTER | Age: 53
End: 2023-10-11
Payer: COMMERCIAL

## 2023-10-11 NOTE — TELEPHONE ENCOUNTER
Contact:      Phone number: [phone], Mobile    Name of person spoken with and relationship to patient: Meggan Espinoza, Self   Patient’s Adherence:      How patient is doing on medication: Good    How many missed doses and reason: 0    Any new medications: No    Any new conditions: No    Any new allergies: No    Any new side effects: No    Any new diagnoses: No    How many doses remainin-Emgality    Did patient want to speak with pharmacist: No  Delivery:      Delivery date and method: 10/11 via     Needs by Date: 10/11 or 10/12    Signature required: No    Any additional details for : Afternoon delivery  Teach Appointment Date: 2023  Shipping Address: [address] [place] [address], [address] [place] [address], [place] [address]  Medication (name, strength and dose): Emgality 120mg/mL, Ubrelvy 100mg tabs  Copay: $0/30ds  Payment Method: N/A, $0 copay  Supplies: None  Additional Information: Pt is aware the insurance will not pay for a refill of Trokendi until 10/31. I recommended for her to call me on or around the  to refill the Trokendi. Next call date: .

## 2023-10-13 ENCOUNTER — PHYSICAL THERAPY (OUTPATIENT)
Dept: PHYSICAL THERAPY | Facility: REHABILITATION | Age: 53
End: 2023-10-13
Attending: PHYSICAL MEDICINE & REHABILITATION
Payer: MEDICAID

## 2023-10-13 DIAGNOSIS — M51.36 LUMBAR DEGENERATIVE DISC DISEASE: ICD-10-CM

## 2023-10-13 PROCEDURE — 97110 THERAPEUTIC EXERCISES: CPT

## 2023-10-13 NOTE — OP THERAPY DAILY TREATMENT
"  Outpatient Physical Therapy  DAILY TREATMENT     [facility] [address].  [address]  [place] [address]  Phone:  [phone]  Fax:  [phone]    Date: 10/13/2023    Patient: Meggan Espinoza  YOB: 1970  MRN: [id number]     Time Calculation    Start time: 1015  Stop time: 1055 Time Calculation (min): 40 minutes         Chief Complaint: No chief complaint on file.    Visit #: 3    SUBJective  Less back pain with some control with some control of pain but bilateral knee pain strated about a week ago    OBJECTIVE:        Therapeutic Treatments and Modalities:     Therapeutic Treatment and Modalities Summary: Ball roll with focus on hamstring recruitment  Bike x 8' level 3  Ball bridge 1'45\",  Ball bridge with hamstring curl 30\" x 2'  Ball bridge with alternating leg lift x 30\" x 2  Ball roll with focus on hamstring recruitment // \" knees are better\"  Superman 1'20\"  Ball wall squats x 15--hep      Time-based treatments/modalities:    Physical Therapy Timed Treatment Charges  Therapeutic exercise minutes (CPT 95617): 40 minutes      Pain rating (1-10) before treatment:  bilateral medial knee pain  5/10  Pain rating (1-10) after treatment:  0\" no pain    ASSESSMENT:   Improving control of lbp with hep     PLAN/RECOMMENDATIONS:   Core stab, hip dissociation          "

## 2023-10-17 ENCOUNTER — OFFICE VISIT (OUTPATIENT)
Dept: SLEEP MEDICINE | Facility: MEDICAL CENTER | Age: 53
End: 2023-10-17
Attending: STUDENT IN AN ORGANIZED HEALTH CARE EDUCATION/TRAINING PROGRAM
Payer: MEDICAID

## 2023-10-17 VITALS
HEIGHT: 59 IN | OXYGEN SATURATION: 93 % | RESPIRATION RATE: 16 BRPM | SYSTOLIC BLOOD PRESSURE: 134 MMHG | DIASTOLIC BLOOD PRESSURE: 88 MMHG | WEIGHT: 157 LBS | BODY MASS INDEX: 31.65 KG/M2 | HEART RATE: 91 BPM

## 2023-10-17 DIAGNOSIS — G47.33 OBSTRUCTIVE SLEEP APNEA: ICD-10-CM

## 2023-10-17 PROCEDURE — 3075F SYST BP GE 130 - 139MM HG: CPT | Performed by: STUDENT IN AN ORGANIZED HEALTH CARE EDUCATION/TRAINING PROGRAM

## 2023-10-17 PROCEDURE — 99212 OFFICE O/P EST SF 10 MIN: CPT | Performed by: STUDENT IN AN ORGANIZED HEALTH CARE EDUCATION/TRAINING PROGRAM

## 2023-10-17 PROCEDURE — 3079F DIAST BP 80-89 MM HG: CPT | Performed by: STUDENT IN AN ORGANIZED HEALTH CARE EDUCATION/TRAINING PROGRAM

## 2023-10-17 PROCEDURE — 99213 OFFICE O/P EST LOW 20 MIN: CPT | Performed by: STUDENT IN AN ORGANIZED HEALTH CARE EDUCATION/TRAINING PROGRAM

## 2023-10-17 ASSESSMENT — FIBROSIS 4 INDEX: FIB4 SCORE: 0.73

## 2023-10-17 NOTE — PROGRESS NOTES
[facility] Follow-up Visit    CC: Follow-up regarding management of obstructive sleep apnea after receiving CPAP machine      HPI:  Meggan Espinoza is a 53 y.o.female  with GERD, type 2 diabetes mellitus, hypertension, dyslipidemia, obesity, migraines, and obstructive sleep apnea on CPAP.  Presents today to follow-up regarding management of obstructive sleep apnea.    She reports since last visit she has received her CPAP machine.  When starting on the CPAP she received a home she felt as though she is getting great sleep.  However as time went on she has had some issues with her device.  She feels at times will turn off in the night and the pressures are too low.  This disturbs her sleep.    DME provider: Klaud[facility]   Device: Oomba 2   Mask: fullface   Aerophagia: No   Snoring: No   Dry mouth: Yes  Leak: No   Skin irritation: No   Chin strap: No       Sleep History  6/21/2023 PSG split-night study showed moderate obstructive sleep apnea with an overall 4% AHI of 17.5 events an hour.  Time at or below 88% saturation 91.6 minutes.  Patient responded well to CPAP therapy.    Patient Active Problem List    Diagnosis Date Noted    Obstructive sleep apnea 07/13/2023    Nocturnal hypoxia 07/13/2023    BMI 31.0-31.9,adult 07/13/2023    Chest pain 02/16/2023    Class 1 obesity due to excess calories with serious comorbidity and body mass index (BMI) of 30.0 to 30.9 in adult 02/16/2023    Left carpal tunnel syndrome 01/24/2022    Carpal tunnel syndrome on both sides 11/10/2021    Migraine with aura, intractable, without status migrainosus 07/25/2018    Papilloma of eyelid 09/25/2014    Vitamin D deficiency disease 02/19/2014    Irregular menses 01/13/2014    Ovarian cyst 07/27/2012    Hypertriglyceridemia 01/17/2012    Elevated LFTs 02/24/2010    HTN (hypertension)     Lower Back Pain due to spondylosis & disc bulge     GERD (gastroesophageal reflux disease) 07/10/2009    DM (diabetes mellitus) (HCC)  07/10/2009    Dyslipidemia 07/10/2009       Past Medical History:   Diagnosis Date    Arthritis     back    Bowel habit changes     IBS for years    Diabetes     Dyslipidemia     GERD (gastroesophageal reflux disease)     Heart burn     High cholesterol     HTN (hypertension)     Hypertension     Lower back pain     chronic    Vitamin deficiency         Past Surgical History:   Procedure Laterality Date    RADIO FREQUENCY ABLATION ADDITIONAL LEVEL Bilateral 5/23/2023    Procedure: BILATERAL radiofrequency neurotomies medial branch targeting the L4-5 and L5-S1 facet joints with fluoroscopic guidance and sedation. Plan for 80 °C for 90 seconds for each neurotomy;  Surgeon: Luis Manuel Coronado M.D.;  [facility];  Service: Pain Management    INJ,ANES LUMBAR/CERVICAL Bilateral 5/9/2023    Procedure: Diagnostic medial branch blocks targeting the BILATERAL L4-5 and L5-S1 facet joints with fluoroscopic guidance #2;  Surgeon: Luis Manuel Coronado M.D.;  Location[facility];  Service: Pain Management    INJ,ANES LUMBAR/CERVICAL Bilateral 5/2/2023    Procedure: Diagnostic medial branch blocks targeting the BILATERAL L4-5 and L5-S1 facet joints with fluoroscopic guidance #1;  Surgeon: Luis Manuel Coronado M.D.;  [facility];  Service: Pain Management    OH INJECTION,SACROILIAC JOINT Bilateral 10/11/2022    Procedure: BILATERAL sacroiliac joint injection with fluoroscopic guidance;  Surgeon: Luis Manuel Coronado M.D.;  [facility];  Service: Pain Management    OH NEUROPLASTY & OR TRANSPOS MEDIAN NRV CARPAL MICHELLE Left 2/15/2022    Procedure: LEFT OPEN CARPAL TUNNEL RELEASE;  Surgeon: Mariusz Corrigan M.D.;  Location: SURGERY SAME DAY [facility];  Service: Orthopedics    OH SHLDR ARTHROSCOP,PART ACROMIOPLAS Right 9/22/2020    Procedure: DECOMPRESSION, SHOULDER, ARTHROSCOPIC - SUBACROMIAL, LABRAL DEBRIDEMENT;  Surgeon: Toribio Coronado M.D.;   Location: SURGERY [facility];  Service: Orthopedics    PB ARTHROSCOPY SHOULDER SURGICAL BICEPS TENODES* Right 9/22/2020    Procedure: ARTHROSCOPY, SHOULDER, WITH BICEPS TENODESIS;  Surgeon: Toribio Coronado M.D.;  Location: SURGERY [facility];  Service: Orthopedics    OTHER ORTHOPEDIC SURGERY  2017    left carpal tunnel release    CHOLECYSTECTOMY      TONSILLECTOMY      TUBAL COAGULATION LAPAROSCOPIC BILATERAL         Family History   Problem Relation Age of Onset    Hypertension Mother     Diabetes Mother        Social History     Socioeconomic History    Marital status:      Spouse name: Not on file    Number of children: Not on file    Years of education: Not on file    Highest education level: Not on file   Occupational History    Not on file   Tobacco Use    Smoking status: Never    Smokeless tobacco: Never   Vaping Use    Vaping Use: Never used   Substance and Sexual Activity    Alcohol use: No    Drug use: No    Sexual activity: Not on file   Other Topics Concern    Not on file   Social History Narrative    Not on file     Social Determinants of Health     Financial Resource Strain: Not on file   Food Insecurity: Not on file   Transportation Needs: Not on file   Physical Activity: Not on file   Stress: Not on file   Social Connections: Not on file   Intimate Partner Violence: Not on file   Housing Stability: Not on file       Current Outpatient Medications   Medication Sig Dispense Refill    Topiramate ER (TROKENDI XR) 200 MG CAPSULE SR 24 HR Take 1 Capsule 24 hour sustained release by mouth every day. 90 Capsule 3    Ubrogepant (UBRELVY) 100 MG Tab TAKE 1 TABLET BY MOUTH AT ONSET OF HEADACHE REPEAT IN 2 HOURS AS NEEDED 10 Tablet 5    Galcanezumab-gnlm (EMGALITY) 120 MG/ML Solution Auto-injector INJECT 1 PEN SUBCUTANEOUSLY AS INSTRUCTED EVERY 4 WEEKS 1 mL 11    atorvastatin (LIPITOR) 40 MG Tab Take 40 mg by mouth every day.      famotidine (PEPCID) 40 MG Tab Take 40 mg by mouth every day.    "   Empagliflozin (JARDIANCE) 10 MG Tab tablet Take 10 mg by mouth every day.      pantoprazole (PROTONIX) 40 MG Tablet Delayed Response Take 1 Tablet by mouth every day.      TRUE METRIX BLOOD GLUCOSE TEST strip USE AS DIRECTED 3-4 TIMES A DAY      LEVEMIR FLEXTOUCH 100 UNIT/ML injection PEN Inject 26 Units under the skin every evening.      colestipol (COLESTID) 1 GM Tab Take 1 g by mouth every day.      [employer] Lancets Ultra-Thin 30G Misc AS DIRECTED FOUR TIMES DAILY CHECK SUGARS 30 DAYS      metformin (GLUCOPHAGE) 1000 MG tablet Take 1,000 mg by mouth 2 times a day.      traZODone (DESYREL) 50 MG Tab Take  mg by mouth every evening. 1-2 tablets      gabapentin (NEURONTIN) 800 MG tablet Take 1 Tab by mouth 3 times a day. 90 Tab 0    Blood Glucose Monitoring Suppl (TRUE METRIX AIR GLUCOSE METER) Device 1 Each by Does not apply route 2 Times a Day. 1 Device 0    Misc. Devices Misc DM lancets Dispense brand covered by patients insurance Testing frequency: Twice dailyDx DM w/o complication. E11.9 100 Each 3    Misc. Devices Misc 1. Glucometer #1   2. Test strips and lancets to test blood sugars qid     #120 120 Each 6    lisinopril (PRINIVIL) 20 MG Tab Take 1 Tab by mouth every day. 30 Tab 11    Misc. Devices MISC Needles for solostar lantus use as directed 120 Each 6     No current facility-administered medications for this visit.        ALLERGIES: Nkda [no known drug allergy]    ROS  Constitutional: Denies fevers, Denies weight changes  Ears/Nose/Throat/Mouth: Denies nasal congestion or sore throat   Cardiovascular: Denies chest pain  Respiratory: Denies shortness of breath, Denies cough  Gastrointestinal/Hepatic: Denies nausea, vomiting  Sleep: see HPI      PHYSICAL EXAM  /88 (BP Location: Left arm, Patient Position: Sitting, BP Cuff Size: Large adult)   Pulse 91   Resp 16   Ht 1.499 m (4' 11\")   Wt 71.2 kg (157 lb)   LMP 07/02/2015   SpO2 93%   BMI 31.71 kg/m²   Appearance: Well-nourished, " well-developed, no acute distress  Eyes:  No scleral icterus , EOMI  Musculoskeletal:  Grossly normal; gait and station normal; digits and nails normal  Skin:  No rashes, petechiae, cyanosis  Neurologic: without focal signs; oriented to person, time, place, and purpose; judgement intact      Medical Decision Making   Assessment and Plan  Meggan Espinoza is a 53 y.o.female  with GERD, type 2 diabetes mellitus, hypertension, dyslipidemia, obesity, migraines, and obstructive sleep apnea on CPAP.  Presents today to follow-up regarding management of obstructive sleep apnea.    The medical record was reviewed.    Obstructive sleep apnea  Compliance data reviewed showing 80% usage > 4hours in last 30  days. Average AHI 1.3 events/hour. Pt continues to use and benefit from machine.      Current Settings auto CPAP 6-12 cmH2O  Due to feeling as though there is not a pressure will increase pressure range.  Min pressure increased to 9 maximum pressure increased to 14.  In addition reviewed the ramp feature on the device.  Advised patient on how to control ramp feature.    PLAN:   -Order placed for mask and supplies   -Increased pressure at today's visit.  -Advised to reach out via MyChart with questions     Has been advised to continue the current CPAP, clean equipment frequently, and get new mask and supplies as allowed by insurance and DME. Recommend an earlier appointment, if significant treatment barriers develop.    Patients with AGUILAR are at increased risk of cardiovascular disease including coronary artery disease, systemic arterial hypertension, pulmonary arterial hypertension, cardiac arrythmias, and stroke. The patient was advised to avoid driving a motor vehicle when drowsy.    Positive airway pressure will favorably impact many of the adverse conditions and effects provoked by AGUILAR.    Have advised the patient to follow up with the appropriate healthcare practitioners for all other medical problems and  issues.    Return in about 3 months (around 1/17/2024).      Please note portions of this record was created using voice recognition software. I have made every reasonable attempt to correct obvious errors, but I expect that there are errors of grammar and possibly content I did not discover before finalizing the note.

## 2023-10-20 ENCOUNTER — APPOINTMENT (OUTPATIENT)
Dept: PHYSICAL THERAPY | Facility: REHABILITATION | Age: 53
End: 2023-10-20
Attending: PHYSICAL MEDICINE & REHABILITATION
Payer: MEDICAID

## 2023-10-27 ENCOUNTER — APPOINTMENT (OUTPATIENT)
Dept: PHYSICAL THERAPY | Facility: REHABILITATION | Age: 53
End: 2023-10-27
Attending: PHYSICAL MEDICINE & REHABILITATION
Payer: MEDICAID

## 2023-11-06 ENCOUNTER — TELEPHONE (OUTPATIENT)
Dept: PHARMACY | Facility: MEDICAL CENTER | Age: 53
End: 2023-11-06
Payer: MEDICAID

## 2023-11-06 PROCEDURE — RXMED WILLOW AMBULATORY MEDICATION CHARGE: Performed by: PSYCHIATRY & NEUROLOGY

## 2023-11-07 NOTE — TELEPHONE ENCOUNTER
Contact:      Phone number: [phone], Mobile    Name of person spoken with and relationship to patient: Meggan Espinoza, Self   Patient’s Adherence:      How patient is doing on medication: Good    How many missed doses and reason: 0    Any new medications: No    Any new conditions: No    Any new allergies: No    Any new side effects: No    Any new diagnoses: No    How many doses remainin-Emgality    Did patient want to speak with pharmacist: No  Delivery:      Delivery date and method:  via     Needs by Date: N/A    Signature required: No    Any additional details for : None   Teach Appointment Date: 23  Shipping Address: [address] [place] [address], [address] [place] [id number]  Medication (name, strength and dose): Emgality 120mg/mL, Trokendi XR 200mg tabs, Ubrelvy 100mg tabs  Copay: $0  Payment Method: N/A, $0 copay   Supplies: None  Additional Information: Next call date: .

## 2023-11-09 ENCOUNTER — PHARMACY VISIT (OUTPATIENT)
Dept: PHARMACY | Facility: MEDICAL CENTER | Age: 53
End: 2023-11-09
Payer: COMMERCIAL

## 2023-11-15 ENCOUNTER — PHYSICAL THERAPY (OUTPATIENT)
Dept: PHYSICAL THERAPY | Facility: REHABILITATION | Age: 53
End: 2023-11-15
Attending: FAMILY MEDICINE
Payer: MEDICAID

## 2023-11-15 DIAGNOSIS — M51.36 LUMBAR DEGENERATIVE DISC DISEASE: ICD-10-CM

## 2023-11-15 PROCEDURE — 97110 THERAPEUTIC EXERCISES: CPT

## 2023-11-15 NOTE — OP THERAPY DAILY TREATMENT
"  Outpatient Physical Therapy  DAILY TREATMENT     [facility] [address].  [address]  [place] [address]  Phone:  [phone]  Fax:  [phone]    Date: 11/15/2023    Patient: Meggan Espinoza  YOB: 1970  MRN: [id number]     Time Calculation                   Chief Complaint: No chief complaint on file.    Visit #: 5    SUBJective  Overall doing well with occasional but  able to manage pain with ex.--6of 8 goals met--able to sit and walk more than 45 minutes    OBJECTIVE:        Therapeutic Treatments and Modalities:     Therapeutic Treatment and Modalities Summary: Reviewed HEP  REIL x 15 with pt. O/p// 2/10    [place] 2'30\"// no pain         Time-based treatments/modalities:           Pain rating (1-10) before treatment:  bilateral medial knee pain  4/10  Pain rating (1-10) after treatment:  0\" no pain  Oswestry 7  ASSESSMENT:   Improving control of lbp with hep with patient reporting abolition of back pain in clinic with jessy progression and core stab    PLAN/RECOMMENDATIONS:     8 of 10 goals met.  Patient is independent with her HEP and is being discharged from therapy at this time.          "

## 2023-11-15 NOTE — OP THERAPY DISCHARGE SUMMARY
Outpatient Physical Therapy  DISCHARGE SUMMARY NOTE      [facility]  [address]  [address]  [facility] [address]  Phone:  [phone]  Fax:  [phone]    Date of Visit: 11/15/2023    Patient: Meggan Espinoza  YOB: 1970  MRN: [id number]     Referring Provider: Meliza Jimenez M.D.  [address] [address]   Referring Diagnosis Low back pain [M54.50]         Functional Assessment Used   Oswestry 7        Your patient is being discharged from Physical Therapy with the following comments:   Goals met    SUBJective  Overall doing well with occasional but  able to manage pain with ex.--6of 8 goals met--able to sit and walk more than 45 minutes      ASSESSMENT:   Improving control of lbp with hep with patient reporting abolition of back pain in clinic with jessy progression and core stab    PLAN/RECOMMENDATIONS:     8 of 10 goals met.  Patient is independent with her HEP and is being discharged from therapy at this time.       Simone Fu, PT, DPT, OCS    Date: 11/15/2023

## 2023-11-17 ENCOUNTER — PHARMACY VISIT (OUTPATIENT)
Dept: PHARMACY | Facility: MEDICAL CENTER | Age: 53
End: 2023-11-17

## 2023-11-17 ENCOUNTER — TELEPHONE (OUTPATIENT)
Dept: PHARMACY | Facility: MEDICAL CENTER | Age: 53
End: 2023-11-17
Payer: MEDICAID

## 2023-11-18 NOTE — TELEPHONE ENCOUNTER
Spoke to Meggan regarding a refill of Emgality as we received medication in. Patient states she missed Spoke to Meggan regarding a refill of Emgality as we received medication in. Patient states she missed her dose last week due to the medication being on back order but will be taking the medication today, once delivered. Arranged for delivery via  today, 11/17. Emgality 120mg/mL #1mL/28ds/$0 to be delivered to the [facility] address on file. Updating delivery date and next call date.

## 2023-12-12 PROCEDURE — RXMED WILLOW AMBULATORY MEDICATION CHARGE: Performed by: PSYCHIATRY & NEUROLOGY

## 2023-12-13 ENCOUNTER — TELEPHONE (OUTPATIENT)
Dept: PHARMACY | Facility: MEDICAL CENTER | Age: 53
End: 2023-12-13
Payer: MEDICAID

## 2023-12-13 NOTE — TELEPHONE ENCOUNTER
Contact:             Phone number: [phone]     Name of person spoken with and relationship to patient: ROGERS POWERS  Medication:   Patient’s Adherence:             How patient is doing on medication: well     How many missed doses and reason: 0     Any new medications: no     Any new conditions: no     Any new allergies: no     Any new side effects: no      Any new diagnoses: no      How many doses remainin     Did patient want to speak with pharmacist: no   Delivery:             Delivery date and method: 12/15       Needs by Date:      Signature required: no     Any additional details for : RENATO Lamb Appointment Date: RENATO   Shipping Address: [address] [place] [address] [address] [place] [address]   Medication(name, strength and dose): EMGALITY 120MG DS $0, UBRELVY 10/30DS $0   Copay: $0   Payment Method: NA   Supplies:    Additional Information:

## 2023-12-15 ENCOUNTER — PHARMACY VISIT (OUTPATIENT)
Dept: PHARMACY | Facility: MEDICAL CENTER | Age: 53
End: 2023-12-15
Payer: COMMERCIAL

## 2024-01-08 ENCOUNTER — TELEPHONE (OUTPATIENT)
Dept: PHARMACY | Facility: MEDICAL CENTER | Age: 54
End: 2024-01-08
Payer: MEDICAID

## 2024-01-08 PROCEDURE — RXMED WILLOW AMBULATORY MEDICATION CHARGE: Performed by: PSYCHIATRY & NEUROLOGY

## 2024-01-09 ENCOUNTER — PHARMACY VISIT (OUTPATIENT)
Dept: PHARMACY | Facility: MEDICAL CENTER | Age: 54
End: 2024-01-09
Payer: COMMERCIAL

## 2024-01-09 NOTE — TELEPHONE ENCOUNTER
Contact:      Phone number: [phone], Mobile    Name of person spoken with and relationship to patient: Meggan Espinoza, Self   Patient’s Adherence:      How patient is doing on medication: Good    How many missed doses and reason: 0    Any new medications: No    Any new conditions: No    Any new allergies: No    Any new side effects: No    Any new diagnoses: No    How many doses remainin-Emgality, 4 to 5 tabs-Ubrelvy    Did patient want to speak with pharmacist: No  Delivery:      Delivery date and method:  via     Needs by Date: 01/10    Signature required: No    Any additional details for : None   Teach Appointment Date: 23  Shipping Address: [address] [place] [address], [address] [place] [address]  Medication (name, strength and dose): Emgality 120mg/mL, Ubrelvy 100mg tabs  Copay: $0  Payment Method: N/A, $0 copay   Supplies: None  Additional Information: Next call date: .

## 2024-01-18 ENCOUNTER — OFFICE VISIT (OUTPATIENT)
Dept: SLEEP MEDICINE | Facility: MEDICAL CENTER | Age: 54
End: 2024-01-18
Attending: STUDENT IN AN ORGANIZED HEALTH CARE EDUCATION/TRAINING PROGRAM
Payer: MEDICAID

## 2024-01-18 VITALS
BODY MASS INDEX: 31.65 KG/M2 | WEIGHT: 157 LBS | RESPIRATION RATE: 16 BRPM | OXYGEN SATURATION: 95 % | HEART RATE: 85 BPM | DIASTOLIC BLOOD PRESSURE: 80 MMHG | HEIGHT: 59 IN | SYSTOLIC BLOOD PRESSURE: 120 MMHG

## 2024-01-18 DIAGNOSIS — G47.33 OBSTRUCTIVE SLEEP APNEA: Primary | ICD-10-CM

## 2024-01-18 PROCEDURE — 99213 OFFICE O/P EST LOW 20 MIN: CPT | Performed by: STUDENT IN AN ORGANIZED HEALTH CARE EDUCATION/TRAINING PROGRAM

## 2024-01-18 PROCEDURE — 3079F DIAST BP 80-89 MM HG: CPT | Performed by: STUDENT IN AN ORGANIZED HEALTH CARE EDUCATION/TRAINING PROGRAM

## 2024-01-18 PROCEDURE — 3074F SYST BP LT 130 MM HG: CPT | Performed by: STUDENT IN AN ORGANIZED HEALTH CARE EDUCATION/TRAINING PROGRAM

## 2024-01-18 PROCEDURE — 99212 OFFICE O/P EST SF 10 MIN: CPT | Performed by: STUDENT IN AN ORGANIZED HEALTH CARE EDUCATION/TRAINING PROGRAM

## 2024-01-18 ASSESSMENT — PATIENT HEALTH QUESTIONNAIRE - PHQ9: CLINICAL INTERPRETATION OF PHQ2 SCORE: 0

## 2024-01-18 ASSESSMENT — FIBROSIS 4 INDEX: FIB4 SCORE: 0.74

## 2024-01-18 NOTE — PROGRESS NOTES
[facility] Follow-up Visit    CC: Follow-up regarding management of obstructive sleep apnea      HPI:  Meggan Espinoza is a 54 y.o.female  with GERD, diabetes mellitus, hypertension, dyslipidemia, obesity, migraines, and obstructive sleep apnea on CPAP.  Presents today to follow-up regarding management of obstructive sleep apnea.    She reports since last visit she had difficulty tolerating her CPAP due to mask discomfort.  She was initially doing well with the full facemask but now she feels a fullface mask with taking it and causing discomfort.  She would like to switch to nasal pillows.  She felt the nasal pillows in the past seem to do pretty well.  Now that she has become more adapted her CPAP machine she feels nasal pillows would be the most comfortable.    She does report a potential move to [place] within the next few months.  She states she does not have a definitive move day.    DME provider: [facility]   Device: SplitSecnd 2   Mask: fullface   Aerophagia: No   Snoring: No   Dry mouth: Yes  Leak: No   Skin irritation: No   Chin strap: No      Sleep History  6/21/2023 PSG split-night study showed moderate obstructive sleep apnea with an overall 4% AHI of 17.5 events an hour.  Time at or below 88% saturation 91.6 minutes.  Patient responded well to CPAP therapy.       Patient Active Problem List    Diagnosis Date Noted    Obstructive sleep apnea 07/13/2023    Nocturnal hypoxia 07/13/2023    BMI 31.0-31.9,adult 07/13/2023    Chest pain 02/16/2023    Class 1 obesity due to excess calories with serious comorbidity and body mass index (BMI) of 30.0 to 30.9 in adult 02/16/2023    Left carpal tunnel syndrome 01/24/2022    Carpal tunnel syndrome on both sides 11/10/2021    Migraine with aura, intractable, without status migrainosus 07/25/2018    Papilloma of eyelid 09/25/2014    Vitamin D deficiency disease 02/19/2014    Irregular menses 01/13/2014    Ovarian cyst 07/27/2012    Hypertriglyceridemia  01/17/2012    Elevated LFTs 02/24/2010    HTN (hypertension)     Lower Back Pain due to spondylosis & disc bulge     GERD (gastroesophageal reflux disease) 07/10/2009    DM (diabetes mellitus) (HCC) 07/10/2009    Dyslipidemia 07/10/2009       Past Medical History:   Diagnosis Date    Arthritis     back    Bowel habit changes     IBS for years    Diabetes     Dyslipidemia     GERD (gastroesophageal reflux disease)     Heart burn     High cholesterol     HTN (hypertension)     Hypertension     Lower back pain     chronic    Vitamin deficiency         Past Surgical History:   Procedure Laterality Date    RADIO FREQUENCY ABLATION ADDITIONAL LEVEL Bilateral 5/23/2023    Procedure: BILATERAL radiofrequency neurotomies medial branch targeting the L4-5 and L5-S1 facet joints with fluoroscopic guidance and sedation. Plan for 80 °C for 90 seconds for each neurotomy;  Surgeon: Luis Manuel Coronado M.D.;  [facility];  Service: Pain Management    INJ,ANES LUMBAR/CERVICAL Bilateral 5/9/2023    Procedure: Diagnostic medial branch blocks targeting the BILATERAL L4-5 and L5-S1 facet joints with fluoroscopic guidance #2;  Surgeon: Luis Manuel Coronado M.D.;  [facility];  Service: Pain Management    INJ,ANES LUMBAR/CERVICAL Bilateral 5/2/2023    Procedure: Diagnostic medial branch blocks targeting the BILATERAL L4-5 and L5-S1 facet joints with fluoroscopic guidance #1;  Surgeon: Luis Manuel Coronado M.D.;  [facility];  Service: Pain Management    [place] INJECTION,SACROILIAC JOINT Bilateral 10/11/2022    Procedure: BILATERAL sacroiliac joint injection with fluoroscopic guidance;  Surgeon: Luis Manuel Coronado M.D.;  [facility];  Service: Pain Management    [place] NEUROPLASTY & OR TRANSPOS MEDIAN NRV CARPAL MICHELLE Left 2/15/2022    Procedure: LEFT OPEN CARPAL TUNNEL RELEASE;  Surgeon: Mariusz Corrigan M.D.;  Location: SURGERY SAME DAY [facility];   Service: Orthopedics    [place] SHLDR ARTHROSCOP,PART ACROMIOPLAS Right 9/22/2020    Procedure: DECOMPRESSION, SHOULDER, ARTHROSCOPIC - SUBACROMIAL, LABRAL DEBRIDEMENT;  Surgeon: Toribio Coronado M.D.;  Location: SURGERY [facility];  Service: Orthopedics    [place] ARTHROSCOPY SHOULDER SURGICAL BICEPS TENODES* Right 9/22/2020    Procedure: ARTHROSCOPY, SHOULDER, WITH BICEPS TENODESIS;  Surgeon: Toribio Coronado M.D.;  Location: SURGERY [facility];  Service: Orthopedics    OTHER ORTHOPEDIC SURGERY  2017    left carpal tunnel release    CHOLECYSTECTOMY      TONSILLECTOMY      TUBAL COAGULATION LAPAROSCOPIC BILATERAL         Family History   Problem Relation Age of Onset    Hypertension Mother     Diabetes Mother        Social History     Socioeconomic History    Marital status:      Spouse name: Not on file    Number of children: Not on file    Years of education: Not on file    Highest education level: Not on file   Occupational History    Not on file   Tobacco Use    Smoking status: Never    Smokeless tobacco: Never   Vaping Use    Vaping Use: Never used   Substance and Sexual Activity    Alcohol use: No    Drug use: No    Sexual activity: Not on file   Other Topics Concern    Not on file   Social History Narrative    Not on file     Social Determinants of Health     Financial Resource Strain: Not on file   Food Insecurity: Not on file   Transportation Needs: Not on file   Physical Activity: Not on file   Stress: Not on file   Social Connections: Not on file   Intimate Partner Violence: Not on file   Housing Stability: Not on file       Current Outpatient Medications   Medication Sig Dispense Refill    Topiramate ER (TROKENDI XR) 200 MG CAPSULE SR 24 HR Take 1 Capsule 24 hour sustained release by mouth every day. 90 Capsule 3    Ubrogepant (UBRELVY) 100 MG Tab TAKE 1 TABLET BY MOUTH AT ONSET OF HEADACHE REPEAT IN 2 HOURS AS NEEDED 10 Tablet 5    Galcanezumab-gnlm (EMGALITY) 120 MG/ML Solution  Auto-injector INJECT 1 PEN SUBCUTANEOUSLY AS INSTRUCTED EVERY 4 WEEKS 1 mL 11    atorvastatin (LIPITOR) 40 MG Tab Take 40 mg by mouth every day.      famotidine (PEPCID) 40 MG Tab Take 40 mg by mouth every day.      Empagliflozin (JARDIANCE) 10 MG Tab tablet Take 10 mg by mouth every day.      pantoprazole (PROTONIX) 40 MG Tablet Delayed Response Take 1 Tablet by mouth every day.      TRUE METRIX BLOOD GLUCOSE TEST strip USE AS DIRECTED 3-4 TIMES A DAY      LEVEMIR FLEXTOUCH 100 UNIT/ML injection PEN Inject 26 Units under the skin every evening.      colestipol (COLESTID) 1 GM Tab Take 1 g by mouth every day.      [employer] Lancets Ultra-Thin 30G Misc AS DIRECTED FOUR TIMES DAILY CHECK SUGARS 30 DAYS      metformin (GLUCOPHAGE) 1000 MG tablet Take 1,000 mg by mouth 2 times a day.      traZODone (DESYREL) 50 MG Tab Take  mg by mouth every evening. 1-2 tablets      gabapentin (NEURONTIN) 800 MG tablet Take 1 Tab by mouth 3 times a day. 90 Tab 0    Blood Glucose Monitoring Suppl (TRUE METRIX AIR GLUCOSE METER) Device 1 Each by Does not apply route 2 Times a Day. 1 Device 0    Misc. Devices Misc DM lancets Dispense brand covered by patients insurance Testing frequency: Twice dailyDx DM w/o complication. E11.9 100 Each 3    Misc. Devices Misc 1. Glucometer #1   2. Test strips and lancets to test blood sugars qid     #120 120 Each 6    lisinopril (PRINIVIL) 20 MG Tab Take 1 Tab by mouth every day. 30 Tab 11    Misc. Devices MISC Needles for solostar lantus use as directed 120 Each 6     No current facility-administered medications for this visit.        ALLERGIES: Nkda [no known drug allergy]    ROS  Constitutional: Denies fevers, Denies weight changes  Ears/Nose/Throat/Mouth: Denies nasal congestion or sore throat   Cardiovascular: Denies chest pain  Respiratory: Denies shortness of breath, Denies cough  Gastrointestinal/Hepatic: Denies nausea, vomiting  Sleep: see HPI      PHYSICAL EXAM  /80   Pulse 85   Resp 16  "  Ht 1.499 m (4' 11\")   Wt 71.2 kg (157 lb)   LMP 07/02/2015   SpO2 95%   BMI 31.71 kg/m²   Appearance: Well-nourished, well-developed, no acute distress  Eyes:  No scleral icterus , EOMI  Musculoskeletal:  Grossly normal; gait and station normal; digits and nails normal  Skin:  No rashes, petechiae, cyanosis  Neurologic: without focal signs; oriented to person, time, place, and purpose; judgement intact      Medical Decision Making   Assessment and Plan  Meggan Espinoza is a 54 y.o.female  with GERD, diabetes mellitus, hypertension, dyslipidemia, obesity, migraines, and obstructive sleep apnea on CPAP.  Presents today to follow-up regarding management of obstructive sleep apnea.    The medical record was reviewed.    Obstructive sleep apnea  Compliance data reviewed showing 30% usage > 4hours in last 30  days. Average AHI 1.6 events/hour. Pt continues to use and benefit from machine.      Current Settings auto CPAP 9-14    PLAN:   -Order placed for mask and supplies   -Order placed for mask fitting nasal pillows  -Advised to reach out via MyChart with questions     Has been advised to continue the current CPAP, clean equipment frequently, and get new mask and supplies as allowed by insurance and DME. Recommend an earlier appointment, if significant treatment barriers develop.    Patients with AGUILAR are at increased risk of cardiovascular disease including coronary artery disease, systemic arterial hypertension, pulmonary arterial hypertension, cardiac arrythmias, and stroke. The patient was advised to avoid driving a motor vehicle when drowsy.    Positive airway pressure will favorably impact many of the adverse conditions and effects provoked by AGUILAR.    Have advised the patient to follow up with the appropriate healthcare practitioners for all other medical problems and issues.    Return if symptoms worsen or fail to improve.      Please note portions of this record was created using voice recognition " software. I have made every reasonable attempt to correct obvious errors, but I expect that there are errors of grammar and possibly content I did not discover before finalizing the note.

## 2024-02-05 ENCOUNTER — TELEPHONE (OUTPATIENT)
Dept: PHARMACY | Facility: MEDICAL CENTER | Age: 54
End: 2024-02-05
Payer: MEDICAID

## 2024-02-05 PROCEDURE — RXMED WILLOW AMBULATORY MEDICATION CHARGE: Performed by: PSYCHIATRY & NEUROLOGY

## 2024-02-05 NOTE — TELEPHONE ENCOUNTER
Contact:      Phone number: [phone], Mobile    Name of person spoken with and relationship to patient: Meggan Espinoza, Self   Patient’s Adherence:      How patient is doing on medication: Good    How many missed doses and reason: 0    Any new medications: No    Any new conditions: No    Any new allergies: No    Any new side effects: No    Any new diagnoses: No    How many doses remainin-Emgality    Did patient want to speak with pharmacist: No  Delivery:      Delivery date and method:  via     Needs by Date:     Signature required: No    Any additional details for : None   Teach Appointment Date: 23  Shipping Address: [address] [place] [address], [address] [place] [id number]  Medication (name, strength and dose): Emgality 120mg/mL, Trokendi XR 200mg caps, Ubrelvy 100mg tabs  Copay: $0  Payment Method: N/A, $0 copay   Supplies: None  Additional Information: Next call date: .

## 2024-02-09 ENCOUNTER — PHARMACY VISIT (OUTPATIENT)
Dept: PHARMACY | Facility: MEDICAL CENTER | Age: 54
End: 2024-02-09
Payer: COMMERCIAL

## 2024-03-05 ENCOUNTER — TELEPHONE (OUTPATIENT)
Dept: PHARMACY | Facility: MEDICAL CENTER | Age: 54
End: 2024-03-05
Payer: MEDICAID

## 2024-03-05 NOTE — TELEPHONE ENCOUNTER
[facility] IS OUT OF TOWN DUE TO A FAMILY EMERGENCY AND WOULD LIKE A CALL BACK IN A FEW WEEKS. UPDATING NEXT CALL DATE ACCORDINGLY

## 2024-03-19 ENCOUNTER — TELEPHONE (OUTPATIENT)
Dept: PHARMACY | Facility: MEDICAL CENTER | Age: 54
End: 2024-03-19
Payer: MEDICAID

## (undated) DEVICE — KIT DISPOSABLE HIP 2.8MM IMPLANT INCLUDES DRILL DRILL GUIDE AND OBTURATOR

## (undated) DEVICE — PROTECTOR ULNA NERVE - (36PR/CA)

## (undated) DEVICE — SUTURE GENERAL

## (undated) DEVICE — GLOVE BIOGEL INDICATOR SZ 8 SURGICAL PF LTX - (50/BX 4BX/CA)

## (undated) DEVICE — LACTATED RINGERS INJ 1000 ML - (14EA/CA 60CA/PF)

## (undated) DEVICE — CHLORAPREP 26 ML APPLICATOR - ORANGE TINT(25/CA)

## (undated) DEVICE — CANISTER SUCTION RIGID RED 1500CC (40EA/CA)

## (undated) DEVICE — TUBE CONNECTING SUCTION - CLEAR PLASTIC STERILE 72 IN (50EA/CA)

## (undated) DEVICE — WATER IRRIGATION STERILE 1000ML (12EA/CA)

## (undated) DEVICE — ELECTRODE DUAL RETURN W/ CORD - (50/PK)

## (undated) DEVICE — ARTHROWAND TURBOVAC 3.5/90 SCT

## (undated) DEVICE — CANNULA TWIST IN 8.25MM X 7CM (5/BX)

## (undated) DEVICE — CANISTER SUCTION 3000ML MECHANICAL FILTER AUTO SHUTOFF MEDI-VAC NONSTERILE LF DISP  (40EA/CA)

## (undated) DEVICE — SODIUM CHL IRRIGATION 0.9% 1000ML (12EA/CA)

## (undated) DEVICE — NEPTUNE 4 PORT MANIFOLD - (20/PK)

## (undated) DEVICE — ELECTRODE 850 FOAM ADHESIVE - HYDROGEL RADIOTRNSPRNT (50/PK)

## (undated) DEVICE — SODIUM CHL. IRRIGATION 0.9% 3000ML (4EA/CA 65CA/PF)

## (undated) DEVICE — DRAPETIBURON SHOULDER W/POUCH - (5EA/CA)

## (undated) DEVICE — GLOVE, LITE (PAIR)

## (undated) DEVICE — MASK ANESTHESIA ADULT  - (100/CA)

## (undated) DEVICE — PACK SHOULDER ARTHROSCOPY SM - (2EA/CA)

## (undated) DEVICE — PACK UPPER EXTREMITY (2EA/CA)

## (undated) DEVICE — PADDING CAST 4 IN STERILE - 4 X 4 YDS (24/CA)

## (undated) DEVICE — TOWEL STOP TIMEOUT SAFETY FLAG (40EA/CA)

## (undated) DEVICE — CLOSURE SKIN STRIP 1/2 X 4 IN - (STERI STRIP) (50/BX 4BX/CA)

## (undated) DEVICE — SENSOR SPO2 NEO LNCS ADHESIVE (20/BX) SEE USER NOTES

## (undated) DEVICE — SPIDER SHOULDER HOLDER (12EA/BX)

## (undated) DEVICE — STOCKINET BIAS 4 IN STERILE - (20/CA)

## (undated) DEVICE — GOWN WARMING STANDARD FLEX - (30/CA)

## (undated) DEVICE — SUCTION INSTRUMENT YANKAUER BULBOUS TIP W/O VENT (50EA/CA)

## (undated) DEVICE — GLOVE BIOGEL SZ 8 SURGICAL PF LTX - (50PR/BX 4BX/CA)

## (undated) DEVICE — GLOVE BIOGEL PI INDICATOR SZ 8.0 SURGICAL PF LF -(50/BX 4BX/CA)

## (undated) DEVICE — HUMID-VENT HEAT AND MOISTURE EXCHANGE- (50/BX)

## (undated) DEVICE — KIT ANESTHESIA W/CIRCUIT & 3/LT BAG W/FILTER (20EA/CA)

## (undated) DEVICE — SUTURE 3-0 PROLENE PS-1 (12PK/BX)

## (undated) DEVICE — SHAVER4.0 AGGRESSIVE + FORMLA (5EA/BX)

## (undated) DEVICE — DRESSING XEROFORM 1X8 - (50/BX 4BX/CA)

## (undated) DEVICE — SHAVER 5.5 RESECTOR FORMULA (5EA/BX )

## (undated) DEVICE — SUTURE 4-0 ETHILON PS-2 18 (12PK/BX)"

## (undated) DEVICE — BAG SPONGE COUNT 10.25 X 32 - BLUE (250/CA)

## (undated) DEVICE — TUBING PATIENT W/CONNECTOR REDEUCE (1EA)

## (undated) DEVICE — TUBING PUMP WITH CONNECTOR REDEUCE (1EA)

## (undated) DEVICE — HEAD HOLDER JUNIOR/ADULT

## (undated) DEVICE — SUTURE 6-0 ETHILON P-1 18 (12PK/BX)"

## (undated) DEVICE — PACK LOWER EXTREMITY - (2/CA)